# Patient Record
Sex: MALE | Race: WHITE | Employment: FULL TIME | URBAN - NONMETROPOLITAN AREA
[De-identification: names, ages, dates, MRNs, and addresses within clinical notes are randomized per-mention and may not be internally consistent; named-entity substitution may affect disease eponyms.]

---

## 2020-08-12 ENCOUNTER — OFFICE VISIT (OUTPATIENT)
Dept: ENT CLINIC | Age: 50
End: 2020-08-12
Payer: COMMERCIAL

## 2020-08-12 VITALS
HEART RATE: 92 BPM | TEMPERATURE: 97.7 F | SYSTOLIC BLOOD PRESSURE: 123 MMHG | HEIGHT: 70 IN | BODY MASS INDEX: 32.21 KG/M2 | DIASTOLIC BLOOD PRESSURE: 87 MMHG | WEIGHT: 225 LBS | OXYGEN SATURATION: 96 %

## 2020-08-12 PROBLEM — J30.9 ALLERGIC RHINITIS: Status: ACTIVE | Noted: 2020-08-12

## 2020-08-12 PROBLEM — R06.02 SHORTNESS OF BREATH: Status: ACTIVE | Noted: 2020-08-12

## 2020-08-12 PROCEDURE — 99214 OFFICE O/P EST MOD 30 MIN: CPT | Performed by: OTOLARYNGOLOGY

## 2020-08-12 RX ORDER — SEMAGLUTIDE 1.34 MG/ML
0.5 INJECTION, SOLUTION SUBCUTANEOUS WEEKLY
COMMUNITY
Start: 2020-07-14

## 2020-08-12 RX ORDER — PANTOPRAZOLE SODIUM 40 MG/1
TABLET, DELAYED RELEASE ORAL
COMMUNITY
Start: 2020-08-11

## 2020-08-12 RX ORDER — HYDROCHLOROTHIAZIDE 25 MG/1
TABLET ORAL
COMMUNITY
Start: 2020-06-24

## 2020-08-12 RX ORDER — ZINC GLUCONATE 50 MG
50 TABLET ORAL DAILY
COMMUNITY

## 2020-08-12 RX ORDER — MONTELUKAST SODIUM 10 MG/1
10 TABLET ORAL DAILY
COMMUNITY

## 2020-08-12 RX ORDER — SYRINGE WITH NEEDLE, 1 ML 25GX5/8"
SYRINGE, EMPTY DISPOSABLE MISCELLANEOUS
COMMUNITY
Start: 2020-04-14

## 2020-08-12 RX ORDER — M-VIT,TX,IRON,MINS/CALC/FOLIC 27MG-0.4MG
1 TABLET ORAL DAILY
COMMUNITY

## 2020-08-12 RX ORDER — GABAPENTIN 300 MG/1
CAPSULE ORAL
COMMUNITY
Start: 2020-01-14

## 2020-08-12 RX ORDER — LOSARTAN POTASSIUM 100 MG/1
TABLET ORAL
COMMUNITY
Start: 2020-05-29

## 2020-08-12 RX ORDER — OXYCODONE HYDROCHLORIDE AND ACETAMINOPHEN 5; 325 MG/1; MG/1
TABLET ORAL
COMMUNITY
Start: 2020-05-25

## 2020-08-12 RX ORDER — TESTOSTERONE CYPIONATE 200 MG/ML
INJECTION INTRAMUSCULAR
COMMUNITY
Start: 2020-06-19

## 2020-08-12 RX ORDER — MV-MIN/FOLIC/VIT K/LYCOP/COQ10 200-100MCG
CAPSULE ORAL
COMMUNITY

## 2020-08-12 RX ORDER — METFORMIN HYDROCHLORIDE 500 MG/1
500 TABLET, EXTENDED RELEASE ORAL 2 TIMES DAILY
COMMUNITY
Start: 2020-07-14

## 2020-08-12 RX ORDER — SUCRALFATE 1 G/1
TABLET ORAL
COMMUNITY
Start: 2020-07-17

## 2020-08-12 ASSESSMENT — ENCOUNTER SYMPTOMS
VOICE CHANGE: 0
CHOKING: 0
TROUBLE SWALLOWING: 1
DIARRHEA: 0
EYE ITCHING: 0
ANAL BLEEDING: 0
STRIDOR: 0
NAUSEA: 1
COLOR CHANGE: 0
CONSTIPATION: 0
EYE DISCHARGE: 0
SHORTNESS OF BREATH: 1
ABDOMINAL DISTENTION: 0
BLOOD IN STOOL: 0
FACIAL SWELLING: 0
CHEST TIGHTNESS: 0
EYE REDNESS: 0
ABDOMINAL PAIN: 0
BACK PAIN: 0
COUGH: 1
SORE THROAT: 1
RHINORRHEA: 0
EYE PAIN: 0
APNEA: 0
WHEEZING: 1
SINUS PAIN: 0
PHOTOPHOBIA: 0
SINUS PRESSURE: 0
RECTAL PAIN: 0
VOMITING: 0

## 2020-08-12 NOTE — PROGRESS NOTES
[facility] [address] [place] [address] [facility]  [address] [address]  Dept: [phone]  Nandini Layne MD    [facility] 52 y.o. male     Patient presents with a chief complaint of New Patient (Patient was referred by Radha Anderson for Dysphagia, chronic cough. Patient does c/o dizziness, trouble swallowing, and nausea. )       /87 (Site: Left Upper Arm, Position: Sitting, Cuff Size: Medium Adult)   Pulse 92   Temp 97.7 °F (36.5 °C) (Infrared)   Ht 5' 10\" (1.778 m)   Wt 225 lb (102.1 kg)   SpO2 96%   BMI 32.28 kg/m²       History of Presenting Illness: The patient/caregiver reports a history of complaint with the following features: Onset: new onset  Timing: daily  Duration: started about 3 months ago  Quality: lump in throat, breathing feels tight, has thick white mucous in nasal passages, chronic cough  Location: tightness sensation in throat, breathing feels tight, like breath is not full  Severity: pain none  Risk factors: GERD, but no relief with treatment of increased PPI dose, recent pneumonia  Alleviating factors: nothing gives relief  given steroid, antibiotic of levaquin, and inhaler  Aggravating factors: nothing makes it worse  Associated factors: no fevers, no facial pain, COVID testing negative but has a lot of potential exposures at work as     Review of systems covering 10 systems is reviewed as staff entry in other note and pertinent positives and negatives noted.     Past Medical History:   Diagnosis Date    Allergic rhinitis 8/12/2020    Diabetes mellitus (HCC)     Dizziness     GERD (gastroesophageal reflux disease)     High blood pressure        Current Outpatient Medications:     pantoprazole (PROTONIX) 40 MG tablet, , Disp: , Rfl:     Semaglutide,0.25 or 0.5MG/DOS, (OZEMPIC, 0.25 OR 0.5 MG/DOSE,) 2 MG/1.5ML SOPN, Inject 0.5 mg into the skin once a week, Disp: , Rfl:     sucralfate (CARAFATE) 1 GM tablet, MIX ONE TABLET WITH A SMALL AMOUNT OF WATER 20 MINUTES BEFORE LAYING DOWN, Disp: , Rfl:     SYRINGE-NEEDLE, DISP, 3 ML (B-D 3CC LUER-CRISTIN SYR 23GX1\") 23G X 1\" 3 ML MISC, USE ONE SYRINGE ONCE A WEEK, Disp: , Rfl:     testosterone cypionate (DEPOTESTOTERONE CYPIONATE) 200 MG/ML injection, INJECT 0.5MILLILITERS (100MG) INTRAMUSCULARLY EVERY 7 DAYS, Disp: , Rfl:     oxyCODONE-acetaminophen (PERCOCET) 5-325 MG per tablet, Take by mouth., Disp: , Rfl:     montelukast (SINGULAIR) 10 MG tablet, Take 10 mg by mouth daily, Disp: , Rfl:     metFORMIN (GLUCOPHAGE-XR) 500 MG extended release tablet, Take 500 mg by mouth 2 times daily, Disp: , Rfl:     diclofenac sodium (VOLTAREN) 1 % GEL, Place onto the skin, Disp: , Rfl:     gabapentin (NEURONTIN) 300 MG capsule, Take by mouth., Disp: , Rfl:     hydroCHLOROthiazide (HYDRODIURIL) 25 MG tablet, TAKE 1 TABLET BY MOUTH EVERY DAY, Disp: , Rfl:     losartan (COZAAR) 100 MG tablet, , Disp: , Rfl:     Fish Oil-Cholecalciferol (OMEGA-3 + VITAMIN D3) 1110-300 MG-UNIT CAPS, Take by mouth, Disp: , Rfl:     zinc gluconate 50 MG tablet, Take 50 mg by mouth daily, Disp: , Rfl:     Multiple Vitamins-Minerals (THERAPEUTIC MULTIVITAMIN-MINERALS) tablet, Take 1 tablet by mouth daily, Disp: , Rfl:    No Known Allergies   Past Surgical History:   Procedure Laterality Date    KNEE SURGERY Left     NASAL SEPTUM SURGERY      OTHER SURGICAL HISTORY      right hamstring      Social History     Socioeconomic History    Marital status:      Spouse name: Not on file    Number of children: Not on file    Years of education: Not on file    Highest education level: Not on file   Occupational History    Not on file   Social Needs    Financial resource strain: Not on file    Food insecurity     Worry: Not on file     Inability: Not on file    Transportation needs     Medical: Not on file     Non-medical: Not on file   Tobacco Use    Smoking status: Never Smoker    Smokeless tobacco: Never Used   Substance and Sexual Activity    Alcohol use: Not Currently    Drug use: Not Currently    Sexual activity: Not on file   Lifestyle    Physical activity     Days per week: Not on file     Minutes per session: Not on file    Stress: Not on file   Relationships    Social connections     Talks on phone: Not on file     Gets together: Not on file     Attends Jainism service: Not on file     Active member of club or organization: Not on file     Attends meetings of clubs or organizations: Not on file     Relationship status: Not on file    Intimate partner violence     Fear of current or ex partner: Not on file     Emotionally abused: Not on file     Physically abused: Not on file     Forced sexual activity: Not on file   Other Topics Concern    Not on file   Social History Narrative    Not on file     Family History   Problem Relation Age of Onset    High Blood Pressure Mother     High Blood Pressure Father         PHYSICAL EXAM:    The patient was examined today 8/12/2020 with findings as follows:    CONSTITUTIONAL:    General Appearance: well-appearing, nontoxic, alert, no acute distress     Communication: understanding at normal conversational tones, normal voicing, speech intelligible    HEAD/FACE:    Head: atraumatic, normocephalic, no lesions    Facial Inspection: no lesions, healthy skin    Facial Strength: motor strength normal, symmetric strength, symmetric movement, motor strength    Sinuses: no sinus tenderness    Salivary Glands: no enlargements of parotid glands, no tenderness of parotid glands, no masses of parotid glands, clear salivary flow on palpation from Stensen's ducts, no duct stones of Stensen's duct, no enlargement of submandibular glands, no tenderness of submandibular glands, no masses of submandibular glands, clear salivary flow from [place]'s ducts, no stones of [place]'s ducts    Temporomandibular Joint: no crepitus with motion, no tenderness on palpation, no trismus, motion symmetric    EYES:    Pupils: PERRLA, extra-ocular movements intact, no nystagmus, sclera white, no redness of eyes, no watering of eyes    EARS:    Bilateral External Ears: no pits, no tags    Right External Ear: normally formed, no lesions, no mastoid tenderness    Left External Ear: normally formed, no lesions, no mastoid tenderness    Right External Auditory Canal: normal, healthy skin, no obstructing cerumen, no discharge    Left External Auditory Canal: normal, healthy skin, no obstructing cerumen, no discharge    Right Tympanic Membrane: normal landmarks, translucent, mobile to pneumatic otoscopy, no perforation    Left Tympanic Membrane: normal landmarks, translucent, mobile to pneumatic otoscopy, no perforation    Hearing: intact to spoken voice, intact to finger rub    NOSE:    Nasal Skin: no lesions, no lacerations, no scars    Nasal Dorsum: symmetric with no visible or palpable deformities    Nasal Tip: normal symmetric nasal tip, normal nasal valves    Nasal Mucosa: pale, bluish    Septum: not markedly deformed, midline, no exposed vessels, no bleeding, no septal granuloma    Turbinates: 3+ boggy    Nasopharynx: normal    ORAL CAVITY/MOUTH:    Lips, teeth, gums: normal lips, normal gums, dentition intact, no dental pain on palpation    Oral Mucosa: normal, moist, no lesions    Palate: normal hard palate, normal soft palate, symmetric palatal elevation    Floor of Mouth: normal floor of mouth    Tongue: geographic tongue, no lesions, no edema, no masses, normal mucosa, mobile    Tonsils: normal tonsils, symmetric, no lesions    Posterior pharynx: normal    NECK:    Neck: no masses, trachea midline, normal range of motion, no cysts or pits, no tenderness to palpation    Thyroid: normal thyroid, no enlargement, no tenderness, no nodules    LYMPH NODES:    Cervical: no palpable lymph node enlargement    RESPIRATORY:    Inspection/Auscultation: reduced air movement with delay in exhalation, chest expands symmetrically, normal breath sounds, no wheezing, no stridor    CARDIOVASCULAR SYSTEM:    Auscultation: regular rate and rhythm, carotid pulse normal, no carotid thrills, no carotid bruits    Observation/Palpation of Peripheral Vascular System: no varicosities, no cyanosis, no edema    SKIN:    General Appearance: no lesions, warm and dry, normal turgor, no bruising    NEUROLOGICAL SYSTEM:    Orientation: oriented to time, oriented to place, oriented to person    Cranial Nerves: Cranial Nerves II-XII intact, normal facial movement    PSYCHIATRIC:    Mood and affect: normal mood, normal affect          Assessment and Plan:    I do not see any significant sinus disease or discharge. He does report GERD history and laryngospasm is considered. I am concerned about possible pulmonary restriction given his symptoms and findings on ausculation today. I feel that is jodie be useful to have a pulmonary evaluation and possible function testing for further evaluation. Diagnosis Orders   1. Shortness of breath     2. Allergic rhinitis, unspecified seasonality, unspecified trigger     3. Gastroesophageal reflux disease, esophagitis presence not specified        No follow-ups on file. The patient and/or caregiver is to notify the office if no improvement or worsening of symptoms is noted prior to the scheduled follow-up for sooner evaluation. The patient and/or caregiver is able to state an understanding of these recommendations and is agreeable to the treatment plan. --Preston Espinoza MD on 8/12/2020 at 9:49 AM    An electronic signature was used to authenticate this note.

## 2020-08-12 NOTE — PATIENT INSTRUCTIONS
SURVEY:    You may be receiving a survey from [employer] regarding your visit today. Please complete the survey to enable us to provide the highest quality of care to you and your family. If you cannot score us a very good on any question, please call the office to discuss how we could have made your experience a very good one. Thank you.

## 2020-08-12 NOTE — PROGRESS NOTES
Review of Systems   Constitutional: Positive for appetite change and fatigue. Negative for activity change, chills, diaphoresis, fever and unexpected weight change. HENT: Positive for postnasal drip, sore throat and trouble swallowing. Negative for congestion, dental problem, drooling, ear discharge, ear pain, facial swelling, hearing loss, mouth sores, nosebleeds, rhinorrhea, sinus pressure, sinus pain, sneezing, tinnitus and voice change. Eyes: Negative for photophobia, pain, discharge, redness, itching and visual disturbance. Respiratory: Positive for cough, shortness of breath and wheezing. Negative for apnea, choking, chest tightness and stridor. Cardiovascular: Negative for chest pain, palpitations and leg swelling. Gastrointestinal: Positive for nausea. Negative for abdominal distention, abdominal pain, anal bleeding, blood in stool, constipation, diarrhea, rectal pain and vomiting. Endocrine: Negative for cold intolerance, heat intolerance, polydipsia, polyphagia and polyuria. Genitourinary: Negative for decreased urine volume, difficulty urinating, discharge, dysuria, enuresis, flank pain, frequency, genital sores, hematuria, penile pain, penile swelling, scrotal swelling, testicular pain and urgency. Musculoskeletal: Negative for arthralgias, back pain, gait problem, joint swelling, myalgias, neck pain and neck stiffness. Skin: Negative for color change, pallor, rash and wound. Allergic/Immunologic: Negative for environmental allergies, food allergies and immunocompromised state. Neurological: Positive for dizziness and light-headedness. Negative for tremors, seizures, syncope, facial asymmetry, speech difficulty, weakness, numbness and headaches. Hematological: Negative for adenopathy. Does not bruise/bleed easily. Psychiatric/Behavioral: Positive for sleep disturbance.  Negative for agitation, behavioral problems, confusion, decreased concentration, dysphoric mood, hallucinations, self-injury and suicidal ideas. The patient is not nervous/anxious and is not hyperactive.

## 2021-08-11 ENCOUNTER — OFFICE VISIT (OUTPATIENT)
Dept: ENT CLINIC | Age: 51
End: 2021-08-11
Payer: COMMERCIAL

## 2021-08-11 VITALS
SYSTOLIC BLOOD PRESSURE: 122 MMHG | WEIGHT: 212 LBS | DIASTOLIC BLOOD PRESSURE: 74 MMHG | HEART RATE: 90 BPM | BODY MASS INDEX: 30.42 KG/M2 | OXYGEN SATURATION: 96 %

## 2021-08-11 DIAGNOSIS — L43.9 LICHEN PLANUS: Primary | ICD-10-CM

## 2021-08-11 DIAGNOSIS — R06.02 SHORTNESS OF BREATH: ICD-10-CM

## 2021-08-11 PROCEDURE — 99213 OFFICE O/P EST LOW 20 MIN: CPT | Performed by: OTOLARYNGOLOGY

## 2021-08-11 RX ORDER — DEXAMETHASONE 0.5 MG/5ML
2 ELIXIR ORAL DAILY
Qty: 200 ML | Refills: 1 | Status: SHIPPED | OUTPATIENT
Start: 2021-08-11 | End: 2021-08-21

## 2021-08-11 RX ORDER — CARVEDILOL 25 MG/1
TABLET ORAL
COMMUNITY

## 2021-08-11 ASSESSMENT — ENCOUNTER SYMPTOMS
VOMITING: 0
NAUSEA: 0
TROUBLE SWALLOWING: 1
ANAL BLEEDING: 0
COLOR CHANGE: 0
EYE DISCHARGE: 0
SHORTNESS OF BREATH: 1
BLOOD IN STOOL: 0
RHINORRHEA: 0
VOICE CHANGE: 1
PHOTOPHOBIA: 0
FACIAL SWELLING: 0
EYE PAIN: 0
SORE THROAT: 1
DIARRHEA: 0
SINUS PAIN: 0
ABDOMINAL PAIN: 0
EYE REDNESS: 0
WHEEZING: 1
RECTAL PAIN: 0
CHOKING: 0
APNEA: 0
STRIDOR: 0
ABDOMINAL DISTENTION: 0
CONSTIPATION: 0
COUGH: 0
SINUS PRESSURE: 0
EYE ITCHING: 0
CHEST TIGHTNESS: 0
BACK PAIN: 0

## 2021-08-11 NOTE — PROGRESS NOTES
Review of Systems   Constitutional: Negative for activity change, appetite change, chills, diaphoresis, fatigue, fever and unexpected weight change. HENT: Positive for sore throat, trouble swallowing and voice change. Negative for congestion, dental problem, drooling, ear discharge, ear pain, facial swelling, hearing loss, mouth sores, nosebleeds, postnasal drip, rhinorrhea, sinus pressure, sinus pain, sneezing and tinnitus. Eyes: Negative for photophobia, pain, discharge, redness, itching and visual disturbance. Respiratory: Positive for shortness of breath and wheezing. Negative for apnea, cough, choking, chest tightness and stridor. Cardiovascular: Negative for chest pain, palpitations and leg swelling. Gastrointestinal: Negative for abdominal distention, abdominal pain, anal bleeding, blood in stool, constipation, diarrhea, nausea, rectal pain and vomiting. Endocrine: Negative for cold intolerance, heat intolerance, polydipsia, polyphagia and polyuria. Genitourinary: Negative for decreased urine volume, difficulty urinating, discharge, dysuria, enuresis, flank pain, frequency, genital sores, hematuria, penile pain, penile swelling, scrotal swelling, testicular pain and urgency. Musculoskeletal: Positive for neck pain. Negative for arthralgias, back pain, gait problem, joint swelling, myalgias and neck stiffness. Skin: Negative for color change, pallor, rash and wound. Allergic/Immunologic: Negative for environmental allergies, food allergies and immunocompromised state. Neurological: Negative for dizziness, tremors, seizures, syncope, facial asymmetry, speech difficulty, weakness, light-headedness, numbness and headaches. Hematological: Negative for adenopathy. Does not bruise/bleed easily. Psychiatric/Behavioral: Negative for agitation, behavioral problems, confusion, decreased concentration, dysphoric mood, hallucinations, self-injury, sleep disturbance and suicidal ideas.  The patient is not nervous/anxious and is not hyperactive.

## 2021-08-11 NOTE — PROGRESS NOTES
[address] [facility]  [address]  Dept: [phone]  MD Sarahi Lopez 54 y.o. male     Patient presents with a chief complaint of Gastroesophageal Reflux (Patient still concern for GERD, without improvement from OTC medication. )       /74 (Site: Right Upper Arm, Position: Sitting, Cuff Size: Large Adult)   Pulse 90   Wt 212 lb (96.2 kg)   SpO2 96%   BMI 30.42 kg/m²       History of Presenting Illness: The patient/caregiver reports a history of complaint with the following features: Onset: started several months ago  Timing: daily  Duration: started about 3 months ago  Quality: lump in throat, breathing feels tight  Location: tightness sensation in throat, breathing feels tight, like breath is not full  Severity: pain mild sore throat, tender to touch side of neck  Risk factors: GERD, but no relief with treatment of increased PPI dose or inhalers  Alleviating factors: nothing gives relief  given steroid, antibiotic of levaquin, and inhaler  Aggravating factors: nothing makes it worse  Associated factors: no fevers, no facial pain    He reports that no abnormality was noted and pulmonary function testing was normal.    Review of systems covering 10 systems is reviewed as staff entry in other note and pertinent positives and negatives noted.     Past Medical History:   Diagnosis Date    Allergic rhinitis 8/12/2020    Diabetes mellitus (HCC)     Dizziness     GERD (gastroesophageal reflux disease)     High blood pressure        Current Outpatient Medications:     carvedilol (COREG) 25 MG tablet, Take by mouth, Disp: , Rfl:     diclofenac sodium (VOLTAREN) 1 % GEL, APPLY TO AFFECTED AREA 4 TIMES A DAY AS NEEDED FOR PAIN, Disp: , Rfl:     dexamethasone 0.5 MG/5ML elixir, Take 20 mLs by mouth daily for 10 days, Disp: 200 mL, Rfl: 1    pantoprazole (PROTONIX) 40 MG tablet, , Disp: , Rfl:    Semaglutide,0.25 or 0.5MG/DOS, (OZEMPIC, 0.25 OR 0.5 MG/DOSE,) 2 MG/1.5ML SOPN, Inject 0.5 mg into the skin once a week, Disp: , Rfl:     sucralfate (CARAFATE) 1 GM tablet, MIX ONE TABLET WITH A SMALL AMOUNT OF WATER 20 MINUTES BEFORE LAYING DOWN, Disp: , Rfl:     SYRINGE-NEEDLE, DISP, 3 ML (B-D 3CC LUER-CRISTIN SYR 23GX1\") 23G X 1\" 3 ML MISC, USE ONE SYRINGE ONCE A WEEK, Disp: , Rfl:     testosterone cypionate (DEPOTESTOTERONE CYPIONATE) 200 MG/ML injection, INJECT 0.5MILLILITERS (100MG) INTRAMUSCULARLY EVERY 7 DAYS, Disp: , Rfl:     oxyCODONE-acetaminophen (PERCOCET) 5-325 MG per tablet, Take by mouth., Disp: , Rfl:     montelukast (SINGULAIR) 10 MG tablet, Take 10 mg by mouth daily, Disp: , Rfl:     metFORMIN (GLUCOPHAGE-XR) 500 MG extended release tablet, Take 500 mg by mouth 2 times daily, Disp: , Rfl:     diclofenac sodium (VOLTAREN) 1 % GEL, Place onto the skin, Disp: , Rfl:     gabapentin (NEURONTIN) 300 MG capsule, Take by mouth., Disp: , Rfl:     hydroCHLOROthiazide (HYDRODIURIL) 25 MG tablet, TAKE 1 TABLET BY MOUTH EVERY DAY, Disp: , Rfl:     losartan (COZAAR) 100 MG tablet, , Disp: , Rfl:     Fish Oil-Cholecalciferol (OMEGA-3 + VITAMIN D3) 1110-300 MG-UNIT CAPS, Take by mouth, Disp: , Rfl:     zinc gluconate 50 MG tablet, Take 50 mg by mouth daily, Disp: , Rfl:     Multiple Vitamins-Minerals (THERAPEUTIC MULTIVITAMIN-MINERALS) tablet, Take 1 tablet by mouth daily, Disp: , Rfl:    No Known Allergies   Past Surgical History:   Procedure Laterality Date    KNEE SURGERY Left     NASAL SEPTUM SURGERY      OTHER SURGICAL HISTORY      right hamstring      Social History     Socioeconomic History    Marital status:      Spouse name: Not on file    Number of children: Not on file    Years of education: Not on file    Highest education level: Not on file   Occupational History    Not on file   Tobacco Use    Smoking status: Never Smoker    Smokeless tobacco: Never Used   Vaping Use    Vaping Use: Never used   Substance and Sexual Activity    Alcohol use: Not Currently    Drug use: Not Currently    Sexual activity: Not on file   Other Topics Concern    Not on file   Social History Narrative    Not on file     Social Determinants of Health     Financial Resource Strain:     Difficulty of Paying Living Expenses:    Food Insecurity:     Worried About Running Out of Food in the Last Year:     [address] in the Last Year:    Transportation Needs:     Lack of Transportation (Medical):      Lack of Transportation (Non-Medical):    Physical Activity:     Days of Exercise per Week:     Minutes of Exercise per Session:    Stress:     Feeling of Stress :    Social Connections:     Frequency of Communication with Friends and Family:     Frequency of Social Gatherings with Friends and Family:     Attends Judaism Services:     Active Member of Clubs or Organizations:     Attends Club or Organization Meetings:     Marital Status:    Intimate Partner Violence:     Fear of Current or Ex-Partner:     Emotionally Abused:     Physically Abused:     Sexually Abused:      Family History   Problem Relation Age of Onset    High Blood Pressure Mother     High Blood Pressure Father         PHYSICAL EXAM:    The patient was examined today 8/11/2021 with findings as follows:    CONSTITUTIONAL:    General Appearance: well-appearing, nontoxic, alert, no acute distress     Communication: understanding at normal conversational tones, normal voicing, speech intelligible    HEAD/FACE:    Head: atraumatic, normocephalic, no lesions    Facial Inspection: no lesions, healthy skin    Facial Strength: motor strength normal, symmetric strength, symmetric movement, motor strength    Sinuses: no sinus tenderness    Salivary Glands: no enlargements of parotid glands, no tenderness of parotid glands, no masses of parotid glands, clear salivary flow on palpation from Stensen's ducts, no duct stones of [place]ensen's duct, no enlargement of submandibular glands, no tenderness of submandibular glands, no masses of submandibular glands, clear salivary flow from [place]'s ducts, no stones of [place]'s ducts    Temporomandibular Joint: no crepitus with motion, no tenderness on palpation, no trismus, motion symmetric    EYES:    Pupils: PERRLA, extra-ocular movements intact, no nystagmus, sclera white, no redness of eyes, no watering of eyes    EARS:    Bilateral External Ears: no pits, no tags    Right External Ear: normally formed, no lesions, no mastoid tenderness    Left External Ear: normally formed, no lesions, no mastoid tenderness    Right External Auditory Canal: normal, healthy skin, no obstructing cerumen, no discharge    Left External Auditory Canal: normal, healthy skin, no obstructing cerumen, no discharge    Right Tympanic Membrane: normal landmarks, translucent, mobile to pneumatic otoscopy, no perforation    Left Tympanic Membrane: normal landmarks, translucent, mobile to pneumatic otoscopy, no perforation    Hearing: intact to spoken voice, intact to finger rub    NOSE:    Nasal Skin: no lesions, no lacerations, no scars    Nasal Dorsum: symmetric with no visible or palpable deformities    Nasal Tip: normal symmetric nasal tip, normal nasal valves    Nasal Mucosa: pale, bluish    Septum: not markedly deformed, midline, no exposed vessels, no bleeding, no septal granuloma    Turbinates: 3+ boggy    Nasopharynx: normal    ORAL CAVITY/MOUTH:    Lips, teeth, gums: normal lips, normal gums, dentition intact, no dental pain on palpation    Oral Mucosa: normal, moist, no lesions    Palate: normal hard palate, normal soft palate, symmetric palatal elevation    Floor of Mouth: normal floor of mouth    Tongue: geographic tongue, no lesions, no edema, no masses, normal mucosa, mobile    Tonsils: normal tonsils, symmetric, no lesions    Posterior pharynx: normal    NECK:    Neck: no masses, trachea midline, normal range of motion, no cysts or pits, no tenderness to palpation    Thyroid: normal thyroid, no enlargement, no tenderness, no nodules    HYPOPHARYNX/LARYNX:    Hypopharynx: normal hypopharynx, normal tongue base, normal pyriform sinus, normal vallecula    Larynx: normal epiglottis, normal false vocal cords, normal true vocal cords, normal glottic mobility, no arytenoid edema, no post-cricoid edema, no subglottic stenosis    LYMPH NODES:    Cervical: no palpable lymph node enlargement    RESPIRATORY:    Inspection/Auscultation: reduced air movement with delay in exhalation, chest expands symmetrically, normal breath sounds, no wheezing, no stridor    CARDIOVASCULAR SYSTEM:    Auscultation: regular rate and rhythm, carotid pulse normal, no carotid thrills, no carotid bruits    Observation/Palpation of Peripheral Vascular System: no varicosities, no cyanosis, no edema    SKIN:    General Appearance: no lesions, warm and dry, normal turgor, no bruising    NEUROLOGICAL SYSTEM:    Orientation: oriented to time, oriented to place, oriented to person    Cranial Nerves: Cranial Nerves II-XII intact, normal facial movement    PSYCHIATRIC:    Mood and affect: normal mood, normal affect          Assessment and Plan:    I do not see any significant sinus disease or discharge. He does report GERD history and laryngospasm is again considered. He reports that pulmonary function testing was normal.  He continue to have mucosal change of the tongue and lichen planus is considered given his new reports of tenderness as well as more diffuse inflammation such as eosinophilic esophagitis. I have offered a trial of oral steroid rinse to see if this gives some relief. We have also have discussed keeping a food diary to identify any possible triggers. Diagnosis Orders   1. Lichen planus  dexamethasone 0.5 MG/5ML elixir   2. Shortness of breath        Return in about 3 months (around 11/11/2021).     The patient and/or caregiver is to notify the office if no improvement or worsening of symptoms is noted prior to the scheduled follow-up for sooner evaluation. The patient and/or caregiver is able to state an understanding of these recommendations and is agreeable to the treatment plan. --Alejandra Carlson MD on 8/11/2021 at 3:37 PM    An electronic signature was used to authenticate this note.

## 2021-08-11 NOTE — PATIENT INSTRUCTIONS
SURVEY:    You may be receiving a survey from [facility] regarding your visit today. Please complete the survey to enable us to provide the highest quality of care to you and your family. If you cannot score us as very good on any question, please call the office to discuss how we could have made your experience exceptional.     Thank you.

## 2021-11-10 ENCOUNTER — OFFICE VISIT (OUTPATIENT)
Dept: ENT CLINIC | Age: 51
End: 2021-11-10
Payer: COMMERCIAL

## 2021-11-10 VITALS
HEIGHT: 70 IN | RESPIRATION RATE: 18 BRPM | OXYGEN SATURATION: 98 % | HEART RATE: 88 BPM | BODY MASS INDEX: 29.35 KG/M2 | DIASTOLIC BLOOD PRESSURE: 82 MMHG | TEMPERATURE: 97.2 F | SYSTOLIC BLOOD PRESSURE: 124 MMHG | WEIGHT: 205 LBS

## 2021-11-10 DIAGNOSIS — R06.02 SHORTNESS OF BREATH: ICD-10-CM

## 2021-11-10 DIAGNOSIS — R07.0 THROAT PAIN IN ADULT: Primary | ICD-10-CM

## 2021-11-10 DIAGNOSIS — L43.9 LICHEN PLANUS: ICD-10-CM

## 2021-11-10 PROCEDURE — 99213 OFFICE O/P EST LOW 20 MIN: CPT | Performed by: OTOLARYNGOLOGY

## 2021-11-10 ASSESSMENT — ENCOUNTER SYMPTOMS
SINUS PAIN: 0
VOMITING: 0
SORE THROAT: 1
CHOKING: 0
ABDOMINAL DISTENTION: 0
BACK PAIN: 0
BLOOD IN STOOL: 0
ABDOMINAL PAIN: 0
EYE ITCHING: 0
FACIAL SWELLING: 0
EYE DISCHARGE: 0
TROUBLE SWALLOWING: 0
RECTAL PAIN: 0
PHOTOPHOBIA: 0
SINUS PRESSURE: 0
COLOR CHANGE: 0
WHEEZING: 0
RHINORRHEA: 0
EYE PAIN: 0
COUGH: 0
CHEST TIGHTNESS: 0
CONSTIPATION: 0
DIARRHEA: 0
APNEA: 0
STRIDOR: 0
EYE REDNESS: 0
SHORTNESS OF BREATH: 0
VOICE CHANGE: 0
NAUSEA: 0
ANAL BLEEDING: 0

## 2021-11-10 NOTE — PROGRESS NOTES
Review of Systems   Constitutional: Negative for activity change, appetite change, chills, diaphoresis, fatigue, fever and unexpected weight change. HENT: Positive for sore throat. Negative for congestion, dental problem, drooling, ear discharge, ear pain, facial swelling, hearing loss, mouth sores, nosebleeds, postnasal drip, rhinorrhea, sinus pressure, sinus pain, sneezing, tinnitus, trouble swallowing and voice change. Eyes: Negative for photophobia, pain, discharge, redness, itching and visual disturbance. Respiratory: Negative for apnea, cough, choking, chest tightness, shortness of breath, wheezing and stridor. Cardiovascular: Negative for chest pain, palpitations and leg swelling. Gastrointestinal: Negative for abdominal distention, abdominal pain, anal bleeding, blood in stool, constipation, diarrhea, nausea, rectal pain and vomiting. Endocrine: Negative for cold intolerance, heat intolerance, polydipsia, polyphagia and polyuria. Genitourinary: Negative for decreased urine volume, difficulty urinating, dysuria, enuresis, flank pain, frequency, genital sores, hematuria, penile discharge, penile pain, penile swelling, scrotal swelling, testicular pain and urgency. Musculoskeletal: Negative for arthralgias, back pain, gait problem, joint swelling, myalgias, neck pain and neck stiffness. Skin: Negative for color change, pallor, rash and wound. Allergic/Immunologic: Negative for environmental allergies, food allergies and immunocompromised state. Neurological: Negative for dizziness, tremors, seizures, syncope, facial asymmetry, speech difficulty, weakness, light-headedness, numbness and headaches. Hematological: Negative for adenopathy. Does not bruise/bleed easily. Psychiatric/Behavioral: Negative for agitation, behavioral problems, confusion, decreased concentration, dysphoric mood, hallucinations, self-injury, sleep disturbance and suicidal ideas.  The patient is not nervous/anxious and is not hyperactive.

## 2022-08-03 ENCOUNTER — HOSPITAL ENCOUNTER (OUTPATIENT)
Dept: PHYSICAL THERAPY | Age: 52
Setting detail: THERAPIES SERIES
Discharge: HOME | End: 2022-08-03

## 2022-08-05 NOTE — THERAPY EVALUATION
Phone: Scooter Rey    Fax: [phone]                   Outpatient Physical Therapy                                      FUNCTIONAL CAPACITY EVALUATION  Date: 8/3/2022  Client: Tracy Hunt        Diagnosis: Left knee pain    : 1970  (50 y.o.)  Referring Physician:  Dr. Sahil Emery MD;  Bulmaro Flores CNP    Tracy Hunt is a 50 y.o. white male who was referred for a functional capacity evaluation to determine his current physical tolerance in regards to material and non-material handling activities and to assess his ability for return to work. Client has experienced left knee pain since  after sustaining a work related injury as a [employer]  when involved in [employer]. He has undergone several surgeries including ORIF and subsequent hardware removal, injections and physical therapy. He had returned to work however in  he contracted COVID with hospitalization and placed on ventilator. 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 PMHx also includes SOB and stuttering resulting form Covid long haulers, [employer]  [employer]ures left hand. SUBJECTIVE:  Subjectively, the patient rates his current left knee pain at a level of 4/10 (on a scale of 0-10 with 10 requiring medical assistance) with daily activities. His pain ranges from   4-8/10 depending on activity or position. Post-evaluation his pain had increased to 5/10. In regards to his left knee, he notes constant pain which varies based on position or activity. Medication provides minimal pain relief. The Client is able to complete self-care activities and most household chores but may experience increased left knee pain; excessive activity may cause him to be unable to complete activities for several day afterwards.   He is able to drive short distances but must take frequent postural breaks if riding/driving for longer distances. He has a home TENS unit which he may use at night and applies ice several times a day for pain/edema control. He notes that his left knee \" reginaldo\" and he experiences lateral leg numbness;  he also reports increased sensitivity over the incisional area and avoids bumping or touching that region due to this sensitivity. OBJECTIVE:    Strength:     strength for the right hand = 98# while the left = 75#. Normative data for the clients age and sex is right = # # and left = ##. Left upper extremity strength is generally graded 5/5 flexion and 5/5 abduction. Right upper extremity flexion and abduction 5/5. Left lower extremity hip flexion graded 4+/5 and quad/knee extension 4+-5/5 with 1 repetition manual muscle testing;  right LE is graded 5/5 for knee extension and hip flexion. An isometric strength comparison test was also completed which revealed a 30% quad strength deficit of left compared to right. Range of Motion:   Left hip mobility WFL;  left knee active ROM limited 25 deg extension to 90 deg flexion. Material Handling:  (Maximum safe weight without an increase in pain level. Weight recorded is to be lifting only on an infrequent basis)    -Floor to Waist:   50-75#    -Work simulation:  Client was able to pull a 200# dummy across the floor on an infrequent basis. He would not be safe to carry such a dummy due to instability of the left knee. Non-Material Handling:    -Bending/Stooping (repetitive)   []Not Recommended    [x]Occasional     []Frequent     []Constant    -Bending/Squatting (static)  []Not Recommended    [x]Occasional     []Frequent     []Constant    -Kneeling   []Not Recommended    [x]Occasional     []Frequent     []Constant    -Sitting -  1 hour before requiring postural change.    -Standing - < 10 min. before requiring postural change.     -Walking - []Not Recommended  [x]Occasional    []Frequent evaluation, please feel free to contact me at [phone]. Thank you.        Sincerely,     BRITTANY JOY, PT

## 2023-06-21 LAB
ALANINE AMINOTRANSFERASE (SGPT) (U/L) IN SER/PLAS: 28 U/L (ref 10–52)
ALBUMIN (G/DL) IN SER/PLAS: 4.6 G/DL (ref 3.4–5)
ALKALINE PHOSPHATASE (U/L) IN SER/PLAS: 121 U/L (ref 33–120)
ANION GAP IN SER/PLAS: 14 MMOL/L (ref 10–20)
ASPARTATE AMINOTRANSFERASE (SGOT) (U/L) IN SER/PLAS: 20 U/L (ref 9–39)
BASOPHILS (10*3/UL) IN BLOOD BY AUTOMATED COUNT: 0.06 X10E9/L (ref 0–0.1)
BASOPHILS/100 LEUKOCYTES IN BLOOD BY AUTOMATED COUNT: 0.6 % (ref 0–2)
BILIRUBIN TOTAL (MG/DL) IN SER/PLAS: 0.6 MG/DL (ref 0–1.2)
CALCIUM (MG/DL) IN SER/PLAS: 9.4 MG/DL (ref 8.6–10.3)
CARBON DIOXIDE, TOTAL (MMOL/L) IN SER/PLAS: 27 MMOL/L (ref 21–32)
CHLORIDE (MMOL/L) IN SER/PLAS: 103 MMOL/L (ref 98–107)
CREATININE (MG/DL) IN SER/PLAS: 1.02 MG/DL (ref 0.5–1.3)
EOSINOPHILS (10*3/UL) IN BLOOD BY AUTOMATED COUNT: 0.26 X10E9/L (ref 0–0.7)
EOSINOPHILS/100 LEUKOCYTES IN BLOOD BY AUTOMATED COUNT: 2.8 % (ref 0–6)
ERYTHROCYTE DISTRIBUTION WIDTH (RATIO) BY AUTOMATED COUNT: 12.3 % (ref 11.5–14.5)
ERYTHROCYTE MEAN CORPUSCULAR HEMOGLOBIN CONCENTRATION (G/DL) BY AUTOMATED: 34.8 G/DL (ref 32–36)
ERYTHROCYTE MEAN CORPUSCULAR VOLUME (FL) BY AUTOMATED COUNT: 90 FL (ref 80–100)
ERYTHROCYTES (10*6/UL) IN BLOOD BY AUTOMATED COUNT: 4.77 X10E12/L (ref 4.5–5.9)
ESTIMATED AVERAGE GLUCOSE FOR HBA1C: 160 MG/DL
GFR MALE: 89 ML/MIN/1.73M2
GLUCOSE (MG/DL) IN SER/PLAS: 133 MG/DL (ref 74–99)
HEMATOCRIT (%) IN BLOOD BY AUTOMATED COUNT: 42.8 % (ref 41–52)
HEMOGLOBIN (G/DL) IN BLOOD: 14.9 G/DL (ref 13.5–17.5)
HEMOGLOBIN A1C/HEMOGLOBIN TOTAL IN BLOOD: 7.2 %
IMMATURE GRANULOCYTES/100 LEUKOCYTES IN BLOOD BY AUTOMATED COUNT: 1.4 % (ref 0–0.9)
LEUKOCYTES (10*3/UL) IN BLOOD BY AUTOMATED COUNT: 9.3 X10E9/L (ref 4.4–11.3)
LYMPHOCYTES (10*3/UL) IN BLOOD BY AUTOMATED COUNT: 1.57 X10E9/L (ref 1.2–4.8)
LYMPHOCYTES/100 LEUKOCYTES IN BLOOD BY AUTOMATED COUNT: 17 % (ref 13–44)
MONOCYTES (10*3/UL) IN BLOOD BY AUTOMATED COUNT: 0.64 X10E9/L (ref 0.1–1)
MONOCYTES/100 LEUKOCYTES IN BLOOD BY AUTOMATED COUNT: 6.9 % (ref 2–10)
NEUTROPHILS (10*3/UL) IN BLOOD BY AUTOMATED COUNT: 6.6 X10E9/L (ref 1.2–7.7)
NEUTROPHILS/100 LEUKOCYTES IN BLOOD BY AUTOMATED COUNT: 71.3 % (ref 40–80)
PLATELETS (10*3/UL) IN BLOOD AUTOMATED COUNT: 384 X10E9/L (ref 150–450)
POTASSIUM (MMOL/L) IN SER/PLAS: 3.8 MMOL/L (ref 3.5–5.3)
PROTEIN TOTAL: 6.9 G/DL (ref 6.4–8.2)
SODIUM (MMOL/L) IN SER/PLAS: 140 MMOL/L (ref 136–145)
UREA NITROGEN (MG/DL) IN SER/PLAS: 22 MG/DL (ref 6–23)

## 2023-06-22 LAB
DEHYDROEPIANDROSTERONE SULFATE (DHEA-S) (UG/DL) IN SER/: 145 UG/DL (ref 95–530)
LUTEINIZING HORMONE (IU/ML) IN SER/PLAS: 3 IU/L

## 2023-09-15 LAB
ANION GAP IN SER/PLAS: 12 MMOL/L (ref 10–20)
CALCIUM (MG/DL) IN SER/PLAS: 8.5 MG/DL (ref 8.6–10.3)
CARBON DIOXIDE, TOTAL (MMOL/L) IN SER/PLAS: 28 MMOL/L (ref 21–32)
CHLORIDE (MMOL/L) IN SER/PLAS: 102 MMOL/L (ref 98–107)
CREATININE (MG/DL) IN SER/PLAS: 1.19 MG/DL (ref 0.5–1.3)
GFR MALE: 74 ML/MIN/1.73M2
GLUCOSE (MG/DL) IN SER/PLAS: 318 MG/DL (ref 74–99)
POTASSIUM (MMOL/L) IN SER/PLAS: 3.9 MMOL/L (ref 3.5–5.3)
SODIUM (MMOL/L) IN SER/PLAS: 138 MMOL/L (ref 136–145)
UREA NITROGEN (MG/DL) IN SER/PLAS: 17 MG/DL (ref 6–23)

## 2023-09-29 LAB
ANION GAP IN SER/PLAS: 12 MMOL/L (ref 10–20)
CALCIUM (MG/DL) IN SER/PLAS: 8.7 MG/DL (ref 8.6–10.3)
CARBON DIOXIDE, TOTAL (MMOL/L) IN SER/PLAS: 27 MMOL/L (ref 21–32)
CHLORIDE (MMOL/L) IN SER/PLAS: 105 MMOL/L (ref 98–107)
CREATININE (MG/DL) IN SER/PLAS: 1.05 MG/DL (ref 0.5–1.3)
GFR MALE: 86 ML/MIN/1.73M2
GLUCOSE (MG/DL) IN SER/PLAS: 139 MG/DL (ref 74–99)
POTASSIUM (MMOL/L) IN SER/PLAS: 3.9 MMOL/L (ref 3.5–5.3)
SODIUM (MMOL/L) IN SER/PLAS: 140 MMOL/L (ref 136–145)
UREA NITROGEN (MG/DL) IN SER/PLAS: 18 MG/DL (ref 6–23)

## 2023-10-04 ENCOUNTER — HOSPITAL ENCOUNTER (OUTPATIENT)
Dept: RADIOLOGY | Facility: HOSPITAL | Age: 53
Discharge: HOME | End: 2023-10-04
Payer: COMMERCIAL

## 2023-10-04 DIAGNOSIS — M50.10 CERVICAL DISC DISORDER WITH RADICULOPATHY, UNSPECIFIED CERVICAL REGION: ICD-10-CM

## 2023-10-04 PROCEDURE — 72141 MRI NECK SPINE W/O DYE: CPT | Performed by: RADIOLOGY

## 2023-10-04 PROCEDURE — 72141 MRI NECK SPINE W/O DYE: CPT

## 2023-10-11 PROBLEM — G43.719 INTRACTABLE CHRONIC COMMON MIGRAINE WITHOUT AURA: Status: ACTIVE | Noted: 2023-10-11

## 2023-10-11 PROBLEM — J38.01 PARALYSIS OF RIGHT VOCAL CORD: Status: ACTIVE | Noted: 2023-04-17

## 2023-10-11 PROBLEM — Z98.1 HISTORY OF CERVICAL SPINAL ARTHRODESIS: Status: ACTIVE | Noted: 2023-10-11

## 2023-10-11 PROBLEM — F41.9 ANXIETY DISORDER: Status: ACTIVE | Noted: 2020-09-23

## 2023-10-11 PROBLEM — Z99.11 DEPENDENT ON VENTILATOR (MULTI): Status: ACTIVE | Noted: 2021-09-20

## 2023-10-11 PROBLEM — E66.9 OBESITY: Status: ACTIVE | Noted: 2023-10-11

## 2023-10-11 PROBLEM — J45.20 INTERMITTENT ASTHMA (HHS-HCC): Status: ACTIVE | Noted: 2020-05-29

## 2023-10-11 PROBLEM — R79.89 DECREASED TESTOSTERONE LEVEL: Status: ACTIVE | Noted: 2023-01-24

## 2023-10-11 PROBLEM — G93.49 STATIC ENCEPHALOPATHY: Status: ACTIVE | Noted: 2022-03-16

## 2023-10-11 PROBLEM — M17.12 LEFT KNEE DJD: Status: ACTIVE | Noted: 2023-10-11

## 2023-10-11 PROBLEM — M54.12 CERVICAL RADICULITIS: Status: ACTIVE | Noted: 2023-10-11

## 2023-10-11 PROBLEM — D35.02 BENIGN NEOPLASM OF LEFT ADRENAL GLAND: Status: ACTIVE | Noted: 2020-08-31

## 2023-10-11 PROBLEM — M19.90 ARTHRITIS: Status: ACTIVE | Noted: 2023-10-11

## 2023-10-11 PROBLEM — R13.14 PHARYNGOESOPHAGEAL DYSPHAGIA: Status: ACTIVE | Noted: 2023-04-17

## 2023-10-11 PROBLEM — L29.9 SEVERE ITCHING: Status: ACTIVE | Noted: 2023-10-11

## 2023-10-11 PROBLEM — E11.9 DIABETES (MULTI): Status: ACTIVE | Noted: 2023-04-26

## 2023-10-11 PROBLEM — G44.221 CHRONIC TENSION-TYPE HEADACHE, INTRACTABLE: Status: ACTIVE | Noted: 2023-10-11

## 2023-10-11 PROBLEM — F51.04 CHRONIC INSOMNIA: Status: ACTIVE | Noted: 2022-12-21

## 2023-10-11 PROBLEM — M50.20 DISPLACEMENT OF CERVICAL INTERVERTEBRAL DISC: Status: ACTIVE | Noted: 2023-10-11

## 2023-10-11 PROBLEM — R06.00 DYSPNEA: Status: ACTIVE | Noted: 2023-10-11

## 2023-10-11 PROBLEM — M54.81 OCCIPITAL NEURALGIA: Status: ACTIVE | Noted: 2023-10-11

## 2023-10-11 PROBLEM — K21.9 GASTROESOPHAGEAL REFLUX DISEASE: Status: ACTIVE | Noted: 2023-01-28

## 2023-10-11 PROBLEM — G47.33 OSA (OBSTRUCTIVE SLEEP APNEA): Status: ACTIVE | Noted: 2023-10-11

## 2023-10-11 PROBLEM — E29.1 DEFICIENCY OF TESTOSTERONE BIOSYNTHESIS: Status: ACTIVE | Noted: 2022-03-27

## 2023-10-11 PROBLEM — R63.4 UNEXPLAINED WEIGHT LOSS: Status: ACTIVE | Noted: 2020-09-23

## 2023-10-11 PROBLEM — M47.812 SPONDYLOSIS OF CERVICAL JOINT WITHOUT MYELOPATHY: Status: ACTIVE | Noted: 2023-10-11

## 2023-10-11 PROBLEM — I10 HYPERTENSION, ESSENTIAL, BENIGN: Status: ACTIVE | Noted: 2023-03-27

## 2023-10-11 PROBLEM — R74.8 HIGH LEVEL OF CARDIAC MARKER: Status: ACTIVE | Noted: 2023-10-11

## 2023-10-11 RX ORDER — METOPROLOL SUCCINATE 25 MG/1
1 TABLET, EXTENDED RELEASE ORAL DAILY
COMMUNITY
End: 2023-10-26 | Stop reason: SDUPTHER

## 2023-10-11 RX ORDER — DICLOFENAC SODIUM 10 MG/G
4 GEL TOPICAL 4 TIMES DAILY PRN
COMMUNITY
Start: 2023-08-19 | End: 2023-11-07 | Stop reason: SDUPTHER

## 2023-10-11 RX ORDER — SEMAGLUTIDE 1.34 MG/ML
1 INJECTION, SOLUTION SUBCUTANEOUS
COMMUNITY
Start: 2023-05-16 | End: 2023-10-26 | Stop reason: SDUPTHER

## 2023-10-11 RX ORDER — HYDROCHLOROTHIAZIDE 25 MG/1
25 TABLET ORAL
COMMUNITY
Start: 2017-08-22 | End: 2023-10-26

## 2023-10-11 RX ORDER — LANCETS 30 GAUGE
EACH MISCELLANEOUS
COMMUNITY
Start: 2022-12-21

## 2023-10-11 RX ORDER — MULTIVITAMIN
1 TABLET ORAL DAILY
COMMUNITY
End: 2023-10-26 | Stop reason: SDUPTHER

## 2023-10-11 RX ORDER — ONDANSETRON 8 MG/1
1 TABLET, ORALLY DISINTEGRATING ORAL EVERY 6 HOURS
COMMUNITY
Start: 2021-09-07 | End: 2023-10-26 | Stop reason: ALTCHOICE

## 2023-10-11 RX ORDER — METFORMIN HYDROCHLORIDE 500 MG/1
500 TABLET ORAL
COMMUNITY
Start: 2017-11-10 | End: 2023-10-26 | Stop reason: SDUPTHER

## 2023-10-11 RX ORDER — OXYCODONE AND ACETAMINOPHEN 5; 325 MG/1; MG/1
1-2 TABLET ORAL DAILY PRN
COMMUNITY
Start: 2020-05-25 | End: 2023-11-07 | Stop reason: SDUPTHER

## 2023-10-11 RX ORDER — DEXAMETHASONE 0.5 MG/5ML
ELIXIR ORAL
COMMUNITY
Start: 2021-08-11 | End: 2023-10-26 | Stop reason: ALTCHOICE

## 2023-10-11 RX ORDER — METFORMIN HYDROCHLORIDE 1000 MG/1
1 TABLET ORAL EVERY 12 HOURS
COMMUNITY
Start: 2021-12-31

## 2023-10-11 RX ORDER — LOSARTAN POTASSIUM 100 MG/1
1 TABLET ORAL DAILY
COMMUNITY
Start: 2017-08-22 | End: 2023-10-26

## 2023-10-11 RX ORDER — ZINC ACETATE 50 MG/1
CAPSULE ORAL
COMMUNITY
Start: 2022-10-27 | End: 2023-10-26 | Stop reason: SDUPTHER

## 2023-10-11 RX ORDER — ALBUTEROL SULFATE 90 UG/1
2 AEROSOL, METERED RESPIRATORY (INHALATION) EVERY 4 HOURS PRN
COMMUNITY
Start: 2020-07-12 | End: 2024-04-16 | Stop reason: ALTCHOICE

## 2023-10-11 RX ORDER — PANTOPRAZOLE SODIUM 20 MG/1
1 TABLET, DELAYED RELEASE ORAL DAILY
COMMUNITY

## 2023-10-11 RX ORDER — LOSARTAN POTASSIUM AND HYDROCHLOROTHIAZIDE 12.5; 1 MG/1; MG/1
1 TABLET ORAL DAILY
COMMUNITY
Start: 2023-07-14 | End: 2023-10-26

## 2023-10-11 RX ORDER — CARVEDILOL 25 MG/1
1 TABLET ORAL 2 TIMES DAILY
COMMUNITY
End: 2023-10-26

## 2023-10-11 RX ORDER — FLASH GLUCOSE SENSOR
1 KIT MISCELLANEOUS
COMMUNITY
Start: 2023-06-26

## 2023-10-11 RX ORDER — ASCORBIC ACID 500 MG
500 TABLET ORAL DAILY
COMMUNITY
Start: 2022-10-27

## 2023-10-11 RX ORDER — LANSOPRAZOLE 30 MG/1
1 CAPSULE, DELAYED RELEASE ORAL
COMMUNITY
End: 2023-10-26

## 2023-10-11 RX ORDER — SEMAGLUTIDE 1.34 MG/ML
1 INJECTION, SOLUTION SUBCUTANEOUS
COMMUNITY
Start: 2023-09-21

## 2023-10-11 RX ORDER — DILTIAZEM HYDROCHLORIDE 120 MG/1
CAPSULE, COATED, EXTENDED RELEASE ORAL
COMMUNITY
Start: 2021-04-28 | End: 2023-10-26

## 2023-10-11 RX ORDER — SEMAGLUTIDE 0.68 MG/ML
1 INJECTION, SOLUTION SUBCUTANEOUS WEEKLY
COMMUNITY
Start: 2023-09-09 | End: 2023-10-26 | Stop reason: SDUPTHER

## 2023-10-11 RX ORDER — ALBUTEROL SULFATE 0.83 MG/ML
2.5 SOLUTION RESPIRATORY (INHALATION) EVERY 4 HOURS PRN
COMMUNITY
End: 2024-04-16 | Stop reason: ALTCHOICE

## 2023-10-11 RX ORDER — MONTELUKAST SODIUM 10 MG/1
1 TABLET ORAL ONCE AS NEEDED
COMMUNITY

## 2023-10-11 RX ORDER — IPRATROPIUM BROMIDE AND ALBUTEROL SULFATE 2.5; .5 MG/3ML; MG/3ML
SOLUTION RESPIRATORY (INHALATION)
COMMUNITY
Start: 2021-09-19 | End: 2023-10-26 | Stop reason: ALTCHOICE

## 2023-10-11 RX ORDER — ZOLPIDEM TARTRATE 10 MG/1
1 TABLET ORAL NIGHTLY PRN
COMMUNITY

## 2023-10-11 RX ORDER — SACUBITRIL AND VALSARTAN 49; 51 MG/1; MG/1
1 TABLET, FILM COATED ORAL 2 TIMES DAILY
COMMUNITY
End: 2023-10-26 | Stop reason: ALTCHOICE

## 2023-10-11 RX ORDER — BLOOD SUGAR DIAGNOSTIC
STRIP MISCELLANEOUS
COMMUNITY
Start: 2017-11-15

## 2023-10-11 RX ORDER — EMPAGLIFLOZIN 10 MG/1
10 TABLET, FILM COATED ORAL DAILY
COMMUNITY
End: 2023-12-07 | Stop reason: SDUPTHER

## 2023-10-11 RX ORDER — PREGABALIN 100 MG/1
1 CAPSULE ORAL 2 TIMES DAILY
COMMUNITY
Start: 2019-11-25 | End: 2023-10-26

## 2023-10-11 RX ORDER — NYSTATIN 100000 [USP'U]/ML
SUSPENSION ORAL
COMMUNITY
Start: 2021-01-22 | End: 2023-10-26 | Stop reason: ALTCHOICE

## 2023-10-11 RX ORDER — PANTOPRAZOLE SODIUM 40 MG/1
40 TABLET, DELAYED RELEASE ORAL
COMMUNITY
Start: 2017-08-22 | End: 2023-10-26 | Stop reason: SDUPTHER

## 2023-10-11 RX ORDER — BUDESONIDE 0.5 MG/2ML
0.5 INHALANT ORAL
COMMUNITY
Start: 2021-09-19 | End: 2023-10-26 | Stop reason: ALTCHOICE

## 2023-10-11 RX ORDER — TESTOSTERONE CYPIONATE 200 MG/ML
200 INJECTION, SOLUTION INTRAMUSCULAR ONCE AS NEEDED
COMMUNITY
Start: 2020-05-22

## 2023-10-11 RX ORDER — SUCRALFATE 1 G/1
1 TABLET ORAL
COMMUNITY
Start: 2020-07-17 | End: 2023-10-26

## 2023-10-11 RX ORDER — NAPROXEN SODIUM 220 MG/1
81 TABLET, FILM COATED ORAL DAILY
COMMUNITY
Start: 2022-10-27

## 2023-10-11 RX ORDER — HYDROCHLOROTHIAZIDE 50 MG/1
1 TABLET ORAL DAILY
COMMUNITY
End: 2023-10-26

## 2023-10-11 RX ORDER — TADALAFIL 20 MG/1
1 TABLET ORAL AS NEEDED
COMMUNITY
Start: 2022-03-25

## 2023-10-11 RX ORDER — PANTOPRAZOLE SODIUM 40 MG/1
1 TABLET, DELAYED RELEASE ORAL DAILY
COMMUNITY
End: 2023-10-26 | Stop reason: SDUPTHER

## 2023-10-11 RX ORDER — ZINC GLUCONATE 50 MG
1 TABLET ORAL DAILY
COMMUNITY

## 2023-10-11 RX ORDER — ACETAMINOPHEN 500 MG
50 TABLET ORAL
COMMUNITY
Start: 2022-10-27

## 2023-10-11 RX ORDER — SACUBITRIL AND VALSARTAN 97; 103 MG/1; MG/1
TABLET, FILM COATED ORAL
COMMUNITY
End: 2023-11-16 | Stop reason: SDUPTHER

## 2023-10-11 RX ORDER — METHYLPREDNISOLONE 4 MG
500 TABLET, DOSE PACK ORAL 2 TIMES DAILY
COMMUNITY
Start: 2022-10-27

## 2023-10-11 RX ORDER — AMLODIPINE BESYLATE 5 MG/1
5 TABLET ORAL DAILY
COMMUNITY
End: 2023-10-26

## 2023-10-12 ENCOUNTER — APPOINTMENT (OUTPATIENT)
Dept: CARDIOLOGY | Facility: CLINIC | Age: 53
End: 2023-10-12
Payer: COMMERCIAL

## 2023-10-19 ENCOUNTER — TELEPHONE (OUTPATIENT)
Dept: CARDIOLOGY | Facility: CLINIC | Age: 53
End: 2023-10-19
Payer: COMMERCIAL

## 2023-10-19 NOTE — TELEPHONE ENCOUNTER
Pt wife Nataliya called stating pt heart rate and bp is still elevated despite new meds given at last OV.  Pt was advised to stop coreg and start metoprolol 25 mg every day along with Jardiance and Entresto.  Nataliya states pt BP is in the 140's/100 and heart rate is rarely under 100.  Even wakes patient up at night.  Pt has fu on 11/2.

## 2023-10-25 NOTE — PROGRESS NOTES
Cardiology Subsequent Encounter Clinic Note  Name: Sd Andino  MRN: [id number]  : 1970    CC: HFrEF    Active Issues:  Sd Andino is a 53 y.o. male with a medical history of hypertension, here for evaluation of the following complaints:     Patient was initially evaluated due to acute attacks of dyspnea lasting a few seconds while sitting. Had a prolonged hospitalization -2021 due to COVID-19 induced acute hypoxic respiratory failure; he was admitted to [facility] and required intubation at this time.    Echocardiogram performed 2023 showed moderately reduced ejection fraction of around 35%. Regadenoson stress test was negative for any ischemia. Event monitor showed no sustained tachyarrhythmias.     Cardiac issues currently include:    ACC/AHA stage C HFrEF  -Echocardiogram performed 2023 showed a moderately reduced ejection fraction 35%.  Now on full dose Entresto, Jardiance 10 mg, Toprol 25 mg daily  -Patient reports dyspnea with moderate exertion; however that has improved since starting GDMT.  He denies any clear chest heaviness but continues to have significant palpitations with fluctuations in his heart rate (as noted on a blood pressure cuff and his fitness tracker).  Also notices frequency; and occasional burning with the initiation of urination.  No fever/chills.  No orthopnea/PND.  Does not appear to have any lower extremity edema.  -Notably event monitor performed 2023 showed a 14% burden of premature atrial contractions but no sustained tachyarrhythmias.      Past Medical History  Past Medical History:   Diagnosis Date    Personal history of other diseases of the circulatory system 2019    History of essential hypertension    Type 2 diabetes mellitus without complications (CMS/[facility])     Type 2 diabetes mellitus without complication, without long-term current use of insulin       Past Surgical History  Past Surgical  History:   Procedure Laterality Date    CT NECK ANGIO W AND WO IV CONTRAST  2/9/2023    CT NECK ANGIO W AND WO IV CONTRAST 2/9/2023 DOCTOR OFFICE [facility]GAC[facility]    MR NECK ANGIO WO IV CONTRAST  2/9/2023    MR NECK ANGIO WO IV CONTRAST 2/9/2023 DOCTOR OFFICE LEGACY    OTHER SURGICAL HISTORY  10/10/2019    Medial branch block    OTHER SURGICAL HISTORY  10/10/2019    Epidural steroid injection    OTHER SURGICAL HISTORY  10/10/2019    Intra-articular corticosteroid injection    OTHER SURGICAL HISTORY  10/10/2019    Intra-articular corticosteroid injection    OTHER SURGICAL HISTORY  10/10/2019    Epidural steroid injection    OTHER SURGICAL HISTORY  10/10/2019    Epidural steroid injection    OTHER SURGICAL HISTORY  10/14/2019    Medial branch block    OTHER SURGICAL HISTORY  11/13/2020    Epidural steroid injection    OTHER SURGICAL HISTORY  02/28/2022    Epidural steroid injection    OTHER SURGICAL HISTORY  06/07/2018    Knee Arthrodesis Left       Medications  Current Outpatient Medications on File Prior to Visit   Medication Sig Dispense Refill    albuterol 2.5 mg /3 mL (0.083 %) nebulizer solution Take 3 mL (2.5 mg) by nebulization every 4 hours if needed.      albuterol 90 mcg/actuation inhaler Inhale 2 puffs every 4 hours if needed.      ascorbic acid (Vitamin C) 500 mg tablet Take 1 tablet (500 mg) by mouth once daily.      aspirin 81 mg chewable tablet Chew 1 tablet (81 mg) once daily.      blood sugar diagnostic (Accu-Chek Alicia Plus test strp) strip Accu-Chek Alicia Plus In Vitro Strip   Refills: 0        Start : 15-Nov-2017   Active      cholecalciferol (Vitamin D-3) 50 mcg (2,000 unit) capsule Take 1 capsule (50 mcg) by mouth.      diclofenac sodium (Voltaren) 1 % gel gel Apply 1 Application topically 4 times a day as needed.      Entresto  mg tablet       FreeStyle Romi sensor system (FreeStyle Romi 2 Sensor) kit Inject 1 each under the skin.      glucosamine sulfate 500 mg tablet Take 500 mg by mouth 2  "times a day.      Jardiance 10 mg Take 1 tablet (10 mg) by mouth once daily.      LANCETS-BLOOD GLUCOSE STRIPS MISC Use to check blood sugar 4 times daily      metFORMIN (Glucophage) 1,000 mg tablet Take 1 tablet (1,000 mg) by mouth every 12 hours.      metoprolol succinate XL (Toprol-XL) 25 mg 24 hr tablet Take 1 tablet (25 mg) by mouth once daily.      montelukast (Singulair) 10 mg tablet Take 1 tablet (10 mg) by mouth 1 time if needed. nightly      multivit-minerals/folic/ginkgo (DAILY MULTIPLE ORAL) Take 1 tablet by mouth once daily.      omega3/dha/epa/fish oil/vit D3 (OMEGA-3 PLUS VITAMIN D3 ORAL) Take 1 capsule by mouth 2 times a day.      OneTouch Verio Flex meter misc USE AS DIRECTED.      oxyCODONE-acetaminophen (Percocet) 5-325 mg tablet Take 1-2 tablets by mouth once daily as needed.      pantoprazole (ProtoNix) 20 mg EC tablet Take 1 tablet (20 mg) by mouth once daily.      semaglutide (Ozempic) 1 mg/dose (2 mg/1.5 mL) pen injector Inject 1 mg under the skin 1 (one) time per week.      syr,ndl,ins,safe 0.5mL,disp un (INSULIN SYRINGE-NEEDLE,DISPOS. MISC) USE ONE SYRINGE ONCE A WEEK      syringe with needle 3 mL 25 gauge x 1\" syringe USE ONE SYRINGE ONCE A WEEK      tadalafil 20 mg tablet Take 1 tablet (20 mg) by mouth if needed.      testosterone cypionate (Depo-Testosterone) 200 mg/mL injection 1 mL (200 mg) 1 time if needed. Every 10 days      zinc gluconate 50 mg tablet Take 1 tablet (50 mg) by mouth once daily.      zolpidem (Ambien) 10 mg tablet Take 1 tablet (10 mg) by mouth as needed at bedtime.      [DISCONTINUED] Entresto 49-51 mg tablet Take 1 tablet by mouth 2 times a day.      [DISCONTINUED] GABAPENTIN ORAL Take 500 mg by mouth twice a day.      [DISCONTINUED] Ozempic 0.25 mg or 0.5 mg (2 mg/3 mL) pen injector Inject 1 mg under the skin 1 (one) time per week in the early morning..      [DISCONTINUED] Ozempic 1 mg/dose (4 mg/3 mL) pen injector Inject 1 mg under the skin 1 (one) time per week.  "     [DISCONTINUED] zinc acetate (Galzin) 50 mg (zinc) capsule       [DISCONTINUED] amLODIPine (Norvasc) 5 mg tablet Take 1 tablet (5 mg) by mouth once daily.      [DISCONTINUED] blood sugar diagnostic strip Use to test blood sugar four times daily      [DISCONTINUED] budesonide (Pulmicort) 0.5 mg/2 mL nebulizer solution Take 2 mL (0.5 mg) by nebulization.      [DISCONTINUED] carvedilol (Coreg) 25 mg tablet Take 1 tablet (25 mg) by mouth 2 times a day.      [DISCONTINUED] dexAMETHasone 0.5 mg/5 mL oral liquid Take by mouth.      [DISCONTINUED] dilTIAZem CD (dilTIAZem ER) 120 mg 24 hr capsule Take by mouth.      [DISCONTINUED] hydroCHLOROthiazide (HYDRODiuril) 25 mg tablet Take 1 tablet (25 mg) by mouth.      [DISCONTINUED] hydroCHLOROthiazide (HYDRODiuril) 50 mg tablet Take 1 tablet (50 mg) by mouth once daily.      [DISCONTINUED] ipratropium-albuteroL (Duo-Neb) 0.5-2.5 mg/3 mL nebulizer solution       [DISCONTINUED] lansoprazole (Prevacid) 30 mg DR capsule Take 1 capsule (30 mg) by mouth once daily in the evening.  Take before meals.      [DISCONTINUED] losartan (Cozaar) 100 mg tablet Take 1 tablet (100 mg) by mouth once daily.      [DISCONTINUED] losartan-hydrochlorothiazide (Hyzaar) 100-12.5 mg tablet Take 1 tablet by mouth once daily.      [DISCONTINUED] metFORMIN (Glucophage) 500 mg tablet Take 1 tablet (500 mg) by mouth.      [DISCONTINUED] multivitamin tablet Take 1 tablet by mouth once daily.      [DISCONTINUED] multivitamin with minerals (MULTIPLE VITAMIN-MINERALS ORAL) Take 1 tablet by mouth once daily.      [DISCONTINUED] multivitamin with minerals (multivitamin) tablet Take 1 tablet by mouth once daily.      [DISCONTINUED] nystatin (Mycostatin) 100,000 unit/mL suspension Take by mouth.      [DISCONTINUED] ondansetron ODT (Zofran-ODT) 8 mg disintegrating tablet Take 1 tablet (8 mg) by mouth every 6 hours.      [DISCONTINUED] pantoprazole (ProtoNix) 40 mg EC tablet Take 1 tablet (40 mg) by mouth.       "[DISCONTINUED] pantoprazole (Protonix) 40 mg EC tablet Take 1 tablet (40 mg) by mouth once daily.      [DISCONTINUED] pregabalin (Lyrica) 100 mg capsule Take 1 capsule (100 mg) by mouth twice a day.      [DISCONTINUED] rivaroxaban (Xarelto) 10 mg tablet Take 1 tablet (10 mg) by mouth.      [DISCONTINUED] SITagliptin phosphate (Januvia) 100 mg tablet Take 1 tablet (100 mg) by mouth once daily.      [DISCONTINUED] sucralfate (Carafate) 1 gram tablet Take 1 tablet (1 g) by mouth.       No current facility-administered medications on file prior to visit.       Allergies  No Known Allergies    Social History  Social History     Tobacco Use    Smoking status: Never    Smokeless tobacco: Never   Substance Use Topics    Alcohol use: Yes     Comment: 10 beers yearly    Drug use: Never       Family History  Family History   Problem Relation Name Age of Onset    Hypertension Mother      Hypertension Father      Heart attack Other GRANDPARENT     Stroke Other GRANDPARENT        Physical Examination  Vitals: /90   Pulse 54   Ht 1.778 m (5' 10\")   Wt 96.3 kg (212 lb 4.8 oz)   SpO2 96%   BMI 30.46 kg/m²   General: awake, alert and oriented. No acute distress.   Skin: Skin is warm, dry and intact without rashes or lesions. Appropriate color for ethnicity. Nail beds pink with no cyanosis or clubbing  HEENT: normocephalic, atraumatic; conjunctivae are clear without exudates or hemorrhage. Sclera is non-icteric. Eyelids are normal in appearance without swelling or lesions. Hearing intact. Nares are patent bilaterally. Moist mucous membranes.   Cardiovascular: Regular. No murmurs, gallops, or rubs are auscultated. S1 and S2 are heard and are of normal intensity. No JVD, no carotid bruits  Respiratory: Thorax symmetric. CTAB, breath sounds vesicular. No crackles, wheezes or ronchi.   Gastrointestinal: soft, non-distended, BS + x 4  Genitourinary: exam deferred  Musculoskeletal: moves all extremities  Extremities: pulses " palpable bilaterally; no swelling or erythema; no edema  Neurological: alert & oriented x 3; no focal deficits  Psychiatric: appropriate mood and affect       Labs/Imaging/Procedures    Lab Results   Component Value Date    HGB 16.4 08/29/2023    HGB 14.9 06/21/2023    HGB 13.0 (L) 02/12/2023     08/29/2023    WBC 10.1 08/29/2023     09/29/2023    K 3.9 09/29/2023    CREATININE 1.05 09/29/2023    CREATININE 1.19 09/15/2023    CREATININE 1.11 08/29/2023    BUN 18 09/29/2023    CALCIUM 8.7 09/29/2023    INR 1.1 02/10/2023     (H) 08/29/2023    TROPHS 22 (H) 08/29/2023    TROPHS 24 (H) 08/29/2023    TROPHS 7 02/11/2023    LDLF 102 (H) 04/12/2022     No echocardiogram results found for the past 12 months  Echocardiogram 9/8/2023:  CONCLUSIONS:  1. Left ventricular systolic function is moderately decreased with a 35-40% estimated ejection fraction.  2. There is global hypokinesis of the left ventricle with minor regional variations.    Stress test 9/8/2023:    1. Negative myocardial perfusion study without evidence of inducible  myocardial ischemia or prior infarction.Predominantly fixed  small-sized moderate perfusion defect involving apex, likely  representing apical thinning, a normal variant.  2. Mild dilatation of left ventricle.  3. Severe globally hypokinesis with a markedly decreased post-stress  LV EF estimated at 29%, correlate clinically.    Impression  Sd Andino is a 53 y.o. male with a medical history of hypertension, here for evaluation of the following complaints:     Patient was initially evaluated due to acute attacks of dyspnea lasting a few seconds while sitting. Had a prolonged hospitalization September 8-September 19, 2021 due to COVID-19 induced acute hypoxic respiratory failure; he was admitted to [facility] and required intubation at this time.    Echocardiogram performed September 8, 2023 showed moderately reduced ejection fraction of around 35%. Regadenoson  stress test was negative for any ischemia. Event monitor showed no sustained tachyarrhythmias.     Cardiac issues currently include:    ACC/AHA stage C HFrEF  -Echocardiogram performed 9/8/2023 showed a moderately reduced ejection fraction 35%.  Now on full dose Entresto, Jardiance 10 mg, Toprol 25 mg daily  -Patient reports dyspnea with moderate exertion; however that has improved since starting GDMT.  He denies any clear chest heaviness but continues to have significant palpitations with fluctuations in his heart rate (as noted on a blood pressure cuff and his fitness tracker).  Also notices frequency; and occasional burning with the initiation of urination.  No fever/chills.  No orthopnea/PND.  Does not appear to have any lower extremity edema.  -Notably event monitor performed August 2023 showed a 14% burden of premature atrial contractions but no sustained tachyarrhythmias.    Plan  -We will increase the metoprolol succinate to 50 mg daily, add Aldactone 12.5 mg daily  -Labs in a week.  (BMP).  We will check a UA to rule out underlying UTI given initiation of Jardiance; although his symptoms do not appear to be classic for a UTI.  -Uncertain what these palpitations were present; possibly may be symptomatic PACs  -RTC 6 weeks    Richy Stokes MD  Advanced Heart Failure/Transplant Cardiology  Cardio-Oncology  [facility]

## 2023-10-26 ENCOUNTER — OFFICE VISIT (OUTPATIENT)
Dept: CARDIOLOGY | Facility: CLINIC | Age: 53
End: 2023-10-26
Payer: COMMERCIAL

## 2023-10-26 VITALS
HEIGHT: 70 IN | OXYGEN SATURATION: 96 % | BODY MASS INDEX: 30.39 KG/M2 | DIASTOLIC BLOOD PRESSURE: 90 MMHG | SYSTOLIC BLOOD PRESSURE: 122 MMHG | HEART RATE: 54 BPM | WEIGHT: 212.3 LBS

## 2023-10-26 DIAGNOSIS — R00.2 PALPITATIONS: ICD-10-CM

## 2023-10-26 DIAGNOSIS — R74.8 HIGH LEVEL OF CARDIAC MARKER: ICD-10-CM

## 2023-10-26 DIAGNOSIS — I50.22 CHRONIC SYSTOLIC HEART FAILURE (MULTI): Primary | ICD-10-CM

## 2023-10-26 PROCEDURE — 3051F HG A1C>EQUAL 7.0%<8.0%: CPT | Performed by: STUDENT IN AN ORGANIZED HEALTH CARE EDUCATION/TRAINING PROGRAM

## 2023-10-26 PROCEDURE — 93000 ELECTROCARDIOGRAM COMPLETE: CPT | Performed by: STUDENT IN AN ORGANIZED HEALTH CARE EDUCATION/TRAINING PROGRAM

## 2023-10-26 PROCEDURE — 3080F DIAST BP >= 90 MM HG: CPT | Performed by: STUDENT IN AN ORGANIZED HEALTH CARE EDUCATION/TRAINING PROGRAM

## 2023-10-26 PROCEDURE — 1036F TOBACCO NON-USER: CPT | Performed by: STUDENT IN AN ORGANIZED HEALTH CARE EDUCATION/TRAINING PROGRAM

## 2023-10-26 PROCEDURE — 99214 OFFICE O/P EST MOD 30 MIN: CPT | Performed by: STUDENT IN AN ORGANIZED HEALTH CARE EDUCATION/TRAINING PROGRAM

## 2023-10-26 PROCEDURE — 3074F SYST BP LT 130 MM HG: CPT | Performed by: STUDENT IN AN ORGANIZED HEALTH CARE EDUCATION/TRAINING PROGRAM

## 2023-10-26 RX ORDER — METOPROLOL SUCCINATE 25 MG/1
50 TABLET, EXTENDED RELEASE ORAL DAILY
Qty: 180 TABLET | Refills: 3 | Status: SHIPPED | OUTPATIENT
Start: 2023-10-26 | End: 2023-10-30 | Stop reason: SDUPTHER

## 2023-10-26 RX ORDER — SPIRONOLACTONE 25 MG/1
12.5 TABLET ORAL DAILY
Qty: 45 TABLET | Refills: 3 | Status: SHIPPED | OUTPATIENT
Start: 2023-10-26 | End: 2023-11-16 | Stop reason: SDUPTHER

## 2023-10-30 ENCOUNTER — TELEPHONE (OUTPATIENT)
Dept: CARDIOLOGY | Facility: CLINIC | Age: 53
End: 2023-10-30
Payer: COMMERCIAL

## 2023-10-30 DIAGNOSIS — I50.22 CHRONIC SYSTOLIC HEART FAILURE (MULTI): ICD-10-CM

## 2023-10-30 RX ORDER — METOPROLOL SUCCINATE 25 MG/1
50 TABLET, EXTENDED RELEASE ORAL DAILY
Qty: 180 TABLET | Refills: 3 | Status: SHIPPED | OUTPATIENT
Start: 2023-10-30 | End: 2023-12-07 | Stop reason: SDUPTHER

## 2023-10-30 NOTE — TELEPHONE ENCOUNTER
GISSELLE CALLED FOR HER MAURICIO BECAUSE WHEN THE WENT TO THE PHARMACY TO  HIS PRESCRIPTION FOR SPIRONOLACTONE, THE PHARMACIST WAS WORRIED ABOUT A REACTION WITH THIS ENTRESTRO.  GADIEL VERIFIED THAT IT WAS FINE TO TAKE BOTH.    THEY DO NEED THE METOPROL SENT TO [facility] W/ A 90 SUPPLY.  IT'S NOT COVERED AT THEIR LOCAL PHARMACY.

## 2023-11-02 ENCOUNTER — APPOINTMENT (OUTPATIENT)
Dept: CARDIOLOGY | Facility: CLINIC | Age: 53
End: 2023-11-02
Payer: COMMERCIAL

## 2023-11-03 ENCOUNTER — TELEPHONE (OUTPATIENT)
Dept: PAIN MEDICINE | Facility: CLINIC | Age: 53
End: 2023-11-03
Payer: COMMERCIAL

## 2023-11-03 DIAGNOSIS — M54.12 CERVICAL RADICULITIS: Primary | ICD-10-CM

## 2023-11-03 DIAGNOSIS — M47.812 SPONDYLOSIS OF CERVICAL JOINT WITHOUT MYELOPATHY: ICD-10-CM

## 2023-11-07 RX ORDER — DICLOFENAC SODIUM 10 MG/G
4 GEL TOPICAL 4 TIMES DAILY PRN
Qty: 100 G | Refills: 1 | Status: SHIPPED | OUTPATIENT
Start: 2023-11-07 | End: 2023-12-19 | Stop reason: SDUPTHER

## 2023-11-07 RX ORDER — OXYCODONE AND ACETAMINOPHEN 5; 325 MG/1; MG/1
1-2 TABLET ORAL DAILY PRN
Qty: 60 TABLET | Refills: 0 | Status: SHIPPED | OUTPATIENT
Start: 2023-11-07 | End: 2023-12-19 | Stop reason: SDUPTHER

## 2023-11-16 DIAGNOSIS — I50.22 CHRONIC SYSTOLIC HEART FAILURE (MULTI): ICD-10-CM

## 2023-11-16 RX ORDER — SPIRONOLACTONE 25 MG/1
12.5 TABLET ORAL DAILY
Qty: 45 TABLET | Refills: 3 | Status: SHIPPED | OUTPATIENT
Start: 2023-11-16 | End: 2024-11-15

## 2023-11-16 RX ORDER — SACUBITRIL AND VALSARTAN 97; 103 MG/1; MG/1
TABLET, FILM COATED ORAL
Qty: 180 TABLET | Refills: 3 | Status: SHIPPED | OUTPATIENT
Start: 2023-11-16

## 2023-11-26 ENCOUNTER — TRANSCRIBE ORDERS (OUTPATIENT)
Dept: OPERATING ROOM | Facility: HOSPITAL | Age: 53
End: 2023-11-26
Payer: COMMERCIAL

## 2023-11-26 ENCOUNTER — HOSPITAL ENCOUNTER (OUTPATIENT)
Facility: HOSPITAL | Age: 53
Setting detail: OUTPATIENT SURGERY
End: 2023-11-26
Attending: ORTHOPAEDIC SURGERY | Admitting: ORTHOPAEDIC SURGERY
Payer: COMMERCIAL

## 2023-11-26 DIAGNOSIS — M17.12 UNILATERAL PRIMARY OSTEOARTHRITIS, LEFT KNEE: Primary | ICD-10-CM

## 2023-11-28 ENCOUNTER — LAB (OUTPATIENT)
Dept: LAB | Facility: LAB | Age: 53
End: 2023-11-28
Payer: COMMERCIAL

## 2023-11-28 DIAGNOSIS — I50.22 CHRONIC SYSTOLIC HEART FAILURE (MULTI): ICD-10-CM

## 2023-11-28 DIAGNOSIS — R74.8 HIGH LEVEL OF CARDIAC MARKER: ICD-10-CM

## 2023-11-28 LAB
ANION GAP SERPL CALC-SCNC: 12 MMOL/L (ref 10–20)
APPEARANCE UR: CLEAR
BILIRUB UR STRIP.AUTO-MCNC: NEGATIVE MG/DL
BUN SERPL-MCNC: 24 MG/DL (ref 6–23)
CALCIUM SERPL-MCNC: 8.8 MG/DL (ref 8.6–10.3)
CHLORIDE SERPL-SCNC: 106 MMOL/L (ref 98–107)
CO2 SERPL-SCNC: 25 MMOL/L (ref 21–32)
COLOR UR: YELLOW
CREAT SERPL-MCNC: 0.99 MG/DL (ref 0.5–1.3)
GFR SERPL CREATININE-BSD FRML MDRD: >90 ML/MIN/1.73M*2
GLUCOSE SERPL-MCNC: 168 MG/DL (ref 74–99)
GLUCOSE UR STRIP.AUTO-MCNC: ABNORMAL MG/DL
KETONES UR STRIP.AUTO-MCNC: ABNORMAL MG/DL
LEUKOCYTE ESTERASE UR QL STRIP.AUTO: NEGATIVE
MUCOUS THREADS #/AREA URNS AUTO: NORMAL /LPF
NITRITE UR QL STRIP.AUTO: NEGATIVE
PH UR STRIP.AUTO: 5 [PH]
POTASSIUM SERPL-SCNC: 3.8 MMOL/L (ref 3.5–5.3)
PROT UR STRIP.AUTO-MCNC: ABNORMAL MG/DL
RBC # UR STRIP.AUTO: NEGATIVE /UL
RBC #/AREA URNS AUTO: NORMAL /HPF
SODIUM SERPL-SCNC: 139 MMOL/L (ref 136–145)
SP GR UR STRIP.AUTO: 1.02
UROBILINOGEN UR STRIP.AUTO-MCNC: <2 MG/DL
WBC #/AREA URNS AUTO: NORMAL /HPF

## 2023-11-28 PROCEDURE — 80048 BASIC METABOLIC PNL TOTAL CA: CPT

## 2023-11-28 PROCEDURE — 81001 URINALYSIS AUTO W/SCOPE: CPT

## 2023-11-28 PROCEDURE — 36415 COLL VENOUS BLD VENIPUNCTURE: CPT

## 2023-12-06 NOTE — PROGRESS NOTES
Cardiology Subsequent Encounter Clinic Note  Name: Sd Andino  MRN: [id number]  : 1970    CC: HFrEF    Active Issues:  Sd Andino is a 53 y.o. male with a medical history of hypertension, here for evaluation of the following complaints:     Patient was initially evaluated due to acute attacks of dyspnea lasting a few seconds while sitting. Had a prolonged hospitalization -2021 due to COVID-19 induced acute hypoxic respiratory failure; he was admitted to [facility] and required intubation at this time.    Echocardiogram performed 2023 showed moderately reduced ejection fraction of around 35%. Regadenoson stress test was negative for any ischemia. Event monitor showed no sustained tachyarrhythmias.     Cardiac issues currently include:    ACC/AHA stage C HFrEF  -Echocardiogram performed 2023 showed a moderately reduced ejection fraction 35%.  Now on full dose Entresto, Jardiance 10 mg, Toprol 2 mg daily, lactone 12.5 mg daily  -Patient reports dyspnea with moderate exertion; however that has improved since starting GDMT.  He denies any clear chest heaviness but continues to have significant palpitations with fluctuations in his heart rate (as noted on a blood pressure cuff and his fitness tracker).  Also notices frequency; and occasional burning with the initiation of urination.  No fever/chills.  No orthopnea/PND.  Does not appear to have any lower extremity edema.  -Notably event monitor performed 2023 showed a 14% burden of premature atrial contractions but no sustained tachyarrhythmias.    Since addition of Aldactone and uptitration of metoprolol he has not noted any significant changes.    Past Medical History  Past Medical History:   Diagnosis Date    Personal history of other diseases of the circulatory system 2019    History of essential hypertension    Type 2 diabetes mellitus without complications (CMS/[facility])     Type 2 diabetes  mellitus without complication, without long-term current use of insulin       Past Surgical History  Past Surgical History:   Procedure Laterality Date    CT ANGIO NECK  2/9/2023    CT NECK ANGIO W AND WO IV CONTRAST 2/9/2023 DOCTOR OFFICE [facility]    MR NECK ANGIO WO IV CONTRAST  2/9/2023    MR NECK ANGIO WO IV CONTRAST 2/9/2023 DOCTOR OFFICE LEGACY    OTHER SURGICAL HISTORY  10/10/2019    Medial branch block    OTHER SURGICAL HISTORY  10/10/2019    Epidural steroid injection    OTHER SURGICAL HISTORY  10/10/2019    Intra-articular corticosteroid injection    OTHER SURGICAL HISTORY  10/10/2019    Intra-articular corticosteroid injection    OTHER SURGICAL HISTORY  10/10/2019    Epidural steroid injection    OTHER SURGICAL HISTORY  10/10/2019    Epidural steroid injection    OTHER SURGICAL HISTORY  10/14/2019    Medial branch block    OTHER SURGICAL HISTORY  11/13/2020    Epidural steroid injection    OTHER SURGICAL HISTORY  02/28/2022    Epidural steroid injection    OTHER SURGICAL HISTORY  06/07/2018    Knee Arthrodesis Left       Medications  Current Outpatient Medications on File Prior to Visit   Medication Sig Dispense Refill    aspirin 81 mg chewable tablet Chew 1 tablet (81 mg) once daily.      blood sugar diagnostic (Accu-Chek Alicia Plus test strp) strip Accu-Chek Alicia Plus In Vitro Strip   Refills: 0        Start : 15-Nov-2017   Active      cholecalciferol (Vitamin D-3) 50 mcg (2,000 unit) capsule Take 1 capsule (50 mcg) by mouth.      diclofenac sodium (Voltaren) 1 % gel gel Apply 1 Application topically 4 times a day as needed (as needed for pain). 100 g 1    FreeStyle Romi sensor system (FreeStyle Romi 2 Sensor) kit Inject 1 each under the skin.      Gemtesa 75 mg tablet Take 1 tablet (75 mg) by mouth once daily.      glucosamine sulfate 500 mg tablet Take 500 mg by mouth 2 times a day.      Jardiance 10 mg Take 1 tablet (10 mg) by mouth once daily.      LANCETS-BLOOD GLUCOSE STRIPS MISC Use to check  "blood sugar 4 times daily      metFORMIN (Glucophage) 1,000 mg tablet Take 1 tablet (1,000 mg) by mouth every 12 hours.      metoprolol succinate XL (Toprol-XL) 25 mg 24 hr tablet Take 2 tablets (50 mg) by mouth once daily. 180 tablet 3    montelukast (Singulair) 10 mg tablet Take 1 tablet (10 mg) by mouth 1 time if needed. nightly      multivit-minerals/folic/ginkgo (DAILY MULTIPLE ORAL) Take 1 tablet by mouth once daily.      omega3/dha/epa/fish oil/vit D3 (OMEGA-3 PLUS VITAMIN D3 ORAL) Take 1 capsule by mouth 2 times a day.      OneTouch Verio Flex meter misc USE AS DIRECTED.      oxyCODONE-acetaminophen (Percocet) 5-325 mg tablet Take 1-2 tablets by mouth once daily as needed for severe pain (7 - 10). 60 tablet 0    pantoprazole (ProtoNix) 20 mg EC tablet Take 1 tablet (20 mg) by mouth once daily.      sacubitriL-valsartan (Entresto)  mg tablet One tablet twice daily 180 tablet 3    semaglutide (Ozempic) 1 mg/dose (2 mg/1.5 mL) pen injector Inject 1 mg under the skin 1 (one) time per week.      spironolactone (Aldactone) 25 mg tablet Take 0.5 tablets (12.5 mg) by mouth once daily. 45 tablet 3    syr,ndl,ins,safe 0.5mL,disp un (INSULIN SYRINGE-NEEDLE,DISPOS. MISC) USE ONE SYRINGE ONCE A WEEK      syringe with needle 3 mL 25 gauge x 1\" syringe USE ONE SYRINGE ONCE A WEEK      tadalafil 20 mg tablet Take 1 tablet (20 mg) by mouth if needed.      testosterone cypionate (Depo-Testosterone) 200 mg/mL injection 1 mL (200 mg) 1 time if needed. Every 10 days      zinc gluconate 50 mg tablet Take 1 tablet (50 mg) by mouth once daily.      zolpidem (Ambien) 10 mg tablet Take 1 tablet (10 mg) by mouth as needed at bedtime.      albuterol 2.5 mg /3 mL (0.083 %) nebulizer solution Take 3 mL (2.5 mg) by nebulization every 4 hours if needed.      albuterol 90 mcg/actuation inhaler Inhale 2 puffs every 4 hours if needed.      ascorbic acid (Vitamin C) 500 mg tablet Take 1 tablet (500 mg) by mouth once daily.       No " "current facility-administered medications on file prior to visit.       Allergies  No Known Allergies    Social History  Social History     Tobacco Use    Smoking status: Never    Smokeless tobacco: Never   Substance Use Topics    Alcohol use: Yes     Comment: 10 beers yearly    Drug use: Never       Family History  Family History   Problem Relation Name Age of Onset    Hypertension Mother      Hypertension Father      Heart attack Other GRANDPARENT     Stroke Other GRANDPARENT        Physical Examination  Vitals: /88 (BP Location: Left arm, Patient Position: Sitting)   Pulse (!) 115   Ht 1.778 m (5' 10\")   Wt 95.3 kg (210 lb)   SpO2 97%   BMI 30.13 kg/m²   General: awake, alert and oriented. No acute distress.   Skin: Skin is warm, dry and intact without rashes or lesions. Appropriate color for ethnicity. Nail beds pink with no cyanosis or clubbing  HEENT: normocephalic, atraumatic; conjunctivae are clear without exudates or hemorrhage. Sclera is non-icteric. Eyelids are normal in appearance without swelling or lesions. Hearing intact. Nares are patent bilaterally. Moist mucous membranes.   Cardiovascular: Regular. No murmurs, gallops, or rubs are auscultated. S1 and S2 are heard and are of normal intensity. No JVD, no carotid bruits  Respiratory: Thorax symmetric. CTAB, breath sounds vesicular. No crackles, wheezes or ronchi.   Gastrointestinal: soft, non-distended, BS + x 4  Genitourinary: exam deferred  Musculoskeletal: moves all extremities  Extremities: pulses palpable bilaterally; no swelling or erythema; no edema  Neurological: alert & oriented x 3; no focal deficits  Psychiatric: appropriate mood and affect       Labs/Imaging/Procedures    Lab Results   Component Value Date    HGB 16.4 08/29/2023    HGB 14.9 06/21/2023    HGB 13.0 (L) 02/12/2023     08/29/2023    WBC 10.1 08/29/2023     11/28/2023    K 3.8 11/28/2023    CREATININE 0.99 11/28/2023    CREATININE 1.05 09/29/2023    " CREATININE 1.19 09/15/2023    CREATININE 1.11 08/29/2023    BUN 24 (H) 11/28/2023    CALCIUM 8.8 11/28/2023    INR 1.1 02/10/2023     (H) 08/29/2023    TROPHS 22 (H) 08/29/2023    TROPHS 24 (H) 08/29/2023    TROPHS 7 02/11/2023    LDLF 102 (H) 04/12/2022     No echocardiogram results found for the past 12 months  Echocardiogram 9/8/2023:  CONCLUSIONS:  1. Left ventricular systolic function is moderately decreased with a 35-40% estimated ejection fraction.  2. There is global hypokinesis of the left ventricle with minor regional variations.    Stress test 9/8/2023:    1. Negative myocardial perfusion study without evidence of inducible  myocardial ischemia or prior infarction.Predominantly fixed  small-sized moderate perfusion defect involving apex, likely  representing apical thinning, a normal variant.  2. Mild dilatation of left ventricle.  3. Severe globally hypokinesis with a markedly decreased post-stress  LV EF estimated at 29%, correlate clinically.    Impression  Sd Andino is a 53 y.o. male with a medical history of hypertension, here for evaluation of the following complaints:     Patient was initially evaluated due to acute attacks of dyspnea lasting a few seconds while sitting. Had a prolonged hospitalization September 8-September 19, 2021 due to COVID-19 induced acute hypoxic respiratory failure; he was admitted to [facility] and required intubation at this time.    Echocardiogram performed September 8, 2023 showed moderately reduced ejection fraction of around 35%. Regadenoson stress test was negative for any ischemia. Event monitor showed no sustained tachyarrhythmias.     Cardiac issues currently include:      ACC/AHA stage C HFrEF  -Echocardiogram performed 9/8/2023 showed a moderately reduced ejection fraction 35%.  Now on full dose Entresto, Jardiance 10 mg, Toprol 2 mg daily, lactone 12.5 mg daily  -Patient reports dyspnea with moderate exertion; however that has improved  since starting GDMT.  He denies any clear chest heaviness but continues to have significant palpitations with fluctuations in his heart rate (as noted on a blood pressure cuff and his fitness tracker).  Also notices frequency; and occasional burning with the initiation of urination.  No fever/chills.  No orthopnea/PND.  Does not appear to have any lower extremity edema.  -Notably event monitor performed August 2023 showed a 14% burden of premature atrial contractions but no sustained tachyarrhythmias.    Since addition of Aldactone and uptitration of metoprolol he has not noted any significant changes.    Plan  -We will increase the metoprolol succinate to 75 mg daily, repeat echocardiogram to assess response to the GDMT  -If persistently low ejection fraction is noted we will consider a left heart catheterization for an ischemic evaluation  -RTC 3 months    Richy Stokes MD  Advanced Heart Failure/Transplant Cardiology  Cardio-Oncology  [facility]

## 2023-12-07 ENCOUNTER — OFFICE VISIT (OUTPATIENT)
Dept: CARDIOLOGY | Facility: CLINIC | Age: 53
End: 2023-12-07
Payer: COMMERCIAL

## 2023-12-07 ENCOUNTER — APPOINTMENT (OUTPATIENT)
Dept: ORTHOPEDIC SURGERY | Facility: CLINIC | Age: 53
End: 2023-12-07
Payer: COMMERCIAL

## 2023-12-07 VITALS
DIASTOLIC BLOOD PRESSURE: 88 MMHG | SYSTOLIC BLOOD PRESSURE: 128 MMHG | BODY MASS INDEX: 30.06 KG/M2 | HEIGHT: 70 IN | HEART RATE: 115 BPM | WEIGHT: 210 LBS | OXYGEN SATURATION: 97 %

## 2023-12-07 DIAGNOSIS — I50.22 CHRONIC SYSTOLIC HEART FAILURE (MULTI): ICD-10-CM

## 2023-12-07 PROCEDURE — 3079F DIAST BP 80-89 MM HG: CPT | Performed by: STUDENT IN AN ORGANIZED HEALTH CARE EDUCATION/TRAINING PROGRAM

## 2023-12-07 PROCEDURE — 99214 OFFICE O/P EST MOD 30 MIN: CPT | Performed by: STUDENT IN AN ORGANIZED HEALTH CARE EDUCATION/TRAINING PROGRAM

## 2023-12-07 PROCEDURE — 1036F TOBACCO NON-USER: CPT | Performed by: STUDENT IN AN ORGANIZED HEALTH CARE EDUCATION/TRAINING PROGRAM

## 2023-12-07 PROCEDURE — 3051F HG A1C>EQUAL 7.0%<8.0%: CPT | Performed by: STUDENT IN AN ORGANIZED HEALTH CARE EDUCATION/TRAINING PROGRAM

## 2023-12-07 PROCEDURE — 3074F SYST BP LT 130 MM HG: CPT | Performed by: STUDENT IN AN ORGANIZED HEALTH CARE EDUCATION/TRAINING PROGRAM

## 2023-12-07 RX ORDER — VIBEGRON 75 MG/1
1 TABLET, FILM COATED ORAL DAILY
COMMUNITY
End: 2023-12-19 | Stop reason: ALTCHOICE

## 2023-12-07 RX ORDER — METOPROLOL SUCCINATE 25 MG/1
75 TABLET, EXTENDED RELEASE ORAL DAILY
Qty: 270 TABLET | Refills: 3 | Status: SHIPPED | OUTPATIENT
Start: 2023-12-07 | End: 2024-05-02 | Stop reason: SDUPTHER

## 2023-12-07 RX ORDER — METOPROLOL SUCCINATE 25 MG/1
75 TABLET, EXTENDED RELEASE ORAL DAILY
Qty: 270 TABLET | Refills: 3 | Status: SHIPPED | OUTPATIENT
Start: 2023-12-07 | End: 2023-12-07 | Stop reason: SDUPTHER

## 2023-12-08 ENCOUNTER — APPOINTMENT (OUTPATIENT)
Dept: ORTHOPEDIC SURGERY | Facility: CLINIC | Age: 53
End: 2023-12-08
Payer: COMMERCIAL

## 2023-12-10 DIAGNOSIS — M17.12 UNILATERAL PRIMARY OSTEOARTHRITIS, LEFT KNEE: Primary | ICD-10-CM

## 2023-12-19 ENCOUNTER — OFFICE VISIT (OUTPATIENT)
Dept: PAIN MEDICINE | Facility: CLINIC | Age: 53
End: 2023-12-19
Payer: COMMERCIAL

## 2023-12-19 VITALS
DIASTOLIC BLOOD PRESSURE: 88 MMHG | RESPIRATION RATE: 16 BRPM | BODY MASS INDEX: 30.71 KG/M2 | WEIGHT: 214 LBS | HEART RATE: 118 BPM | SYSTOLIC BLOOD PRESSURE: 129 MMHG

## 2023-12-19 DIAGNOSIS — M54.12 CERVICAL RADICULITIS: ICD-10-CM

## 2023-12-19 DIAGNOSIS — M47.812 SPONDYLOSIS OF CERVICAL JOINT WITHOUT MYELOPATHY: ICD-10-CM

## 2023-12-19 DIAGNOSIS — M12.562 TRAUMATIC ARTHROPATHY OF LEFT KNEE: Primary | ICD-10-CM

## 2023-12-19 DIAGNOSIS — M17.12 OSTEOARTHRITIS OF LEFT KNEE, UNSPECIFIED OSTEOARTHRITIS TYPE: ICD-10-CM

## 2023-12-19 LAB
AMPHETAMINES UR QL SCN: ABNORMAL
BARBITURATES UR QL SCN: ABNORMAL
BENZODIAZ UR QL SCN: ABNORMAL
BZE UR QL SCN: ABNORMAL
CANNABINOIDS UR QL SCN: ABNORMAL
FENTANYL+NORFENTANYL UR QL SCN: ABNORMAL
OPIATES UR QL SCN: ABNORMAL
OXYCODONE+OXYMORPHONE UR QL SCN: ABNORMAL
PCP UR QL SCN: ABNORMAL

## 2023-12-19 PROCEDURE — 99213 OFFICE O/P EST LOW 20 MIN: CPT | Performed by: PHYSICIAN ASSISTANT

## 2023-12-19 PROCEDURE — 80365 DRUG SCREENING OXYCODONE: CPT | Mod: SAMLAB | Performed by: PHYSICIAN ASSISTANT

## 2023-12-19 PROCEDURE — 80307 DRUG TEST PRSMV CHEM ANLYZR: CPT | Mod: SAMLAB | Performed by: PHYSICIAN ASSISTANT

## 2023-12-19 PROCEDURE — G0463 HOSPITAL OUTPT CLINIC VISIT: HCPCS | Performed by: PHYSICIAN ASSISTANT

## 2023-12-19 RX ORDER — DICLOFENAC SODIUM 10 MG/G
4 GEL TOPICAL 4 TIMES DAILY PRN
Qty: 100 G | Refills: 1 | Status: SHIPPED | OUTPATIENT
Start: 2023-12-19 | End: 2024-03-19 | Stop reason: SDUPTHER

## 2023-12-19 RX ORDER — OXYCODONE AND ACETAMINOPHEN 5; 325 MG/1; MG/1
1-2 TABLET ORAL DAILY PRN
Qty: 60 TABLET | Refills: 0 | Status: SHIPPED | OUTPATIENT
Start: 2023-12-19 | End: 2024-01-18 | Stop reason: SDUPTHER

## 2023-12-19 ASSESSMENT — ENCOUNTER SYMPTOMS
NEUROLOGICAL NEGATIVE: 1
ENDOCRINE NEGATIVE: 1
HEMATOLOGIC/LYMPHATIC NEGATIVE: 1
CONSTITUTIONAL NEGATIVE: 1
GASTROINTESTINAL NEGATIVE: 1
MUSCULOSKELETAL NEGATIVE: 1
EYES NEGATIVE: 1
ALLERGIC/IMMUNOLOGIC NEGATIVE: 1
CARDIOVASCULAR NEGATIVE: 1
RESPIRATORY NEGATIVE: 1
PSYCHIATRIC NEGATIVE: 1

## 2023-12-19 ASSESSMENT — COLUMBIA-SUICIDE SEVERITY RATING SCALE - C-SSRS
1. IN THE PAST MONTH, HAVE YOU WISHED YOU WERE DEAD OR WISHED YOU COULD GO TO SLEEP AND NOT WAKE UP?: NO
6. HAVE YOU EVER DONE ANYTHING, STARTED TO DO ANYTHING, OR PREPARED TO DO ANYTHING TO END YOUR LIFE?: NO
2. HAVE YOU ACTUALLY HAD ANY THOUGHTS OF KILLING YOURSELF?: NO

## 2023-12-19 ASSESSMENT — PAIN SCALES - GENERAL: PAINLEVEL: 4

## 2023-12-19 NOTE — PROGRESS NOTES
Subjective   Patient ID: Sd Andino is a 53 y.o. male who presents for Pain (FUV L knee pain 4/10 today. Standing increases pain. Medication helps lower the pain. Refill for oxycodone needed. BMI education provided; Depression, smoking screen negative. ZONIA = 36/100. ).  Patient is a 53-year-old male.  He has a past medical history significant for left knee arthritis.  He states that he is getting ready to have a left knee replacement in January.  He is having some cardiac issues so he is not sure of the exact date as he is undergoing workup and evaluation for this but he is hopeful that his surgery is going to proceed as planned.  He has a [facility] diagnosis of left knee traumatic arthropathy-M12.562.  He uses Percocet 5/325 twice daily as needed pain.  He is hopeful to maybe come off of this after his surgery.  He is eager to pursue this.        Review of Systems   Constitutional: Negative.    HENT: Negative.     Eyes: Negative.    Respiratory: Negative.     Cardiovascular: Negative.    Gastrointestinal: Negative.    Endocrine: Negative.    Genitourinary: Negative.    Musculoskeletal: Negative.    Skin: Negative.    Allergic/Immunologic: Negative.    Neurological: Negative.    Hematological: Negative.    Psychiatric/Behavioral: Negative.         Objective   Physical Exam  Vitals and nursing note reviewed.   Constitutional:       Appearance: Normal appearance. He is obese.   HENT:      Head: Normocephalic and atraumatic.      Right Ear: External ear normal.      Left Ear: External ear normal.      Nose: Nose normal.      Mouth/Throat:      Mouth: Mucous membranes are moist.      Pharynx: Oropharynx is clear.   Eyes:      Conjunctiva/sclera: Conjunctivae normal.      Pupils: Pupils are equal, round, and reactive to light.   Cardiovascular:      Rate and Rhythm: Normal rate and regular rhythm.      Pulses: Normal pulses.   Pulmonary:      Effort: Pulmonary effort is normal.   Musculoskeletal:         General: Normal  range of motion.      Cervical back: Normal range of motion.   Skin:     General: Skin is warm and dry.   Neurological:      General: No focal deficit present.      Mental Status: He is alert and oriented to person, place, and time. Mental status is at baseline.   Psychiatric:         Mood and Affect: Mood normal.         Behavior: Behavior normal.         Thought Content: Thought content normal.         Judgment: Judgment normal.         Assessment/Plan   Diagnoses and all orders for this visit:  Traumatic arthropathy of left knee  Cervical radiculitis  -     oxyCODONE-acetaminophen (Percocet) 5-325 mg tablet; Take 1-2 tablets by mouth once daily as needed for severe pain (7 - 10).  -     diclofenac sodium (Voltaren) 1 % gel gel; Apply 1 Application topically 4 times a day as needed (as needed for pain).  Spondylosis of cervical joint without myelopathy  -     oxyCODONE-acetaminophen (Percocet) 5-325 mg tablet; Take 1-2 tablets by mouth once daily as needed for severe pain (7 - 10).  -     diclofenac sodium (Voltaren) 1 % gel gel; Apply 1 Application topically 4 times a day as needed (as needed for pain).  Osteoarthritis of left knee, unspecified osteoarthritis type  -     Drug Screen, Urine With Reflex to Confirmation       Patient is a 53-year-old male with a past medical history significant for traumatic arthropathy of the left knee-M12.562 Which is a [facility] claim.  Patient is using Percocet 5/325 twice daily as needed pain with improvement.  He is hopeful to have a knee replacement in January after undergoing evaluation for some cardiac issues.  At this time, he feels that things are overall going well with the medication and is hopeful to have the surgery.  [place] was reviewed and is appropriate.  We will obtain updated UDS today for compliance purposes.  Narcan was offered and declined at this time, patient will follow-up in 2 to 3 months for medication management purposes.  He will call the clinic sooner should  he require anything from our services.    Cherry Covington RN 12/19/23 3:04 PM

## 2023-12-21 ENCOUNTER — OFFICE VISIT (OUTPATIENT)
Dept: ORTHOPEDIC SURGERY | Facility: CLINIC | Age: 53
End: 2023-12-21
Payer: COMMERCIAL

## 2023-12-21 ENCOUNTER — ANCILLARY PROCEDURE (OUTPATIENT)
Dept: RADIOLOGY | Facility: CLINIC | Age: 53
End: 2023-12-21
Payer: COMMERCIAL

## 2023-12-21 DIAGNOSIS — M17.12 UNILATERAL PRIMARY OSTEOARTHRITIS, LEFT KNEE: ICD-10-CM

## 2023-12-21 DIAGNOSIS — M17.32 POST-TRAUMATIC OSTEOARTHRITIS OF LEFT KNEE: Primary | ICD-10-CM

## 2023-12-21 PROCEDURE — 77073 BONE LENGTH STUDIES: CPT

## 2023-12-21 PROCEDURE — 77073 BONE LENGTH STUDIES: CPT | Performed by: RADIOLOGY

## 2023-12-21 PROCEDURE — 99214 OFFICE O/P EST MOD 30 MIN: CPT | Performed by: ORTHOPAEDIC SURGERY

## 2023-12-21 PROCEDURE — 73562 X-RAY EXAM OF KNEE 3: CPT | Mod: LT

## 2023-12-21 PROCEDURE — 73562 X-RAY EXAM OF KNEE 3: CPT | Mod: LEFT SIDE | Performed by: RADIOLOGY

## 2023-12-21 PROCEDURE — G0463 HOSPITAL OUTPT CLINIC VISIT: HCPCS | Performed by: ORTHOPAEDIC SURGERY

## 2023-12-21 ASSESSMENT — PAIN - FUNCTIONAL ASSESSMENT: PAIN_FUNCTIONAL_ASSESSMENT: NO/DENIES PAIN

## 2023-12-21 NOTE — PROGRESS NOTES
Patient is a 53-year-old gentleman who presents today for discussion of upcoming left total knee arthroplasty.  He has a history of previous traumatic injury.  He is now failed a greater than 3-month trial of reasonable conservative treatment, and cortisone injections, anti-inflammatories and oxycodone.  He has difficulty walking sort of distance going downstairs.  He does have some cardiac issues which she needs to clear out prior to surgery.  He rates his pain at times as 8 out of 10.    Left knee:  AAOx3, NAD, walks with a moderate antalgic gait  Varus allignment  Range of motion lacks 10 degrees of full extension and flexes to 110 degrees  Stable to varus/valgus/anterior/posterior stress through out the range of motion  Slight laxity with varus stress  Diffuse medial  joint line tenderness to palpation  Moderate effusion  SILT in a traci/saph/per/tib distribution  5/5 knee extension/df/pf/ehl  ½ dorsalis pedis and posterior tibial pulse  no popliteal lymphadenopathy  no other overlying lesions  mood: euthymic  Respirations non labored    Plain films were reviewed by myself in clinic today.  They have advanced osteoarthritis of their knee with significant joint space narrowing, bone on bone changes, underlying sclerosis and tricompartmental osteophytic change.  Evidence of his previous open reduction internal fixation.    Patient is now failed a greater than 3-month trial of reasonable conservative treatment and wishes proceed with surgery which is indicated at this time.  Discussed the risk benefits alternatives to surgery.  All of their questions were answered.    Procedure: left total knee  Location: [facility]  ICD10: M17.12  CPT: 26435  Stay: outpatient  Equipment: Depuy Wormhole Primary Velys Jorden    I talked with the patient at length about risks, limitations, benefits and alternatives to total knee replacement today. I reviewed concerns about implant wear, loosening, breakage, infection and infection prophylaxis,  DVT, PE, death and other medical and anesthetic complications of surgery. We talked about the potential for persistent pain following surgery since there are many possible causes for knee and leg pain. The patient was advised that knee replacement will only relieve pain that is coming from the knee. We talked about limited range of motion following knee replacement and the importance of physical therapy and their motivation. We talked about improvements in pain management post-operatively and our accelerated rehab program. We talked about wound healing issues and neurovascular complications of surgery. I reviewed functional and activity restrictions in detail. We discussed the possible need for a homologous blood transfusion. We discussed the fact that many of our patients are able to go home in 1 day or less depending on their health, mobility, pre-op preparation, individual home situation and personal preference.  The patient has identified their personal goals of their joint replacement surgery and recovery and we have discussed them. These are documented in our [employer] patient engagement platform. In addition, we have discussed the advantages and disadvantages of various implant and fixation options, as well as various surgical approaches.  The basic concepts of the joint replacement procedure has been reviewed with the patient and the patient has been provided the opportunity to see an actual implant either in the office or in our pre-op education class.  All of the patients questions were answered. The patient can call my office to schedule surgery and the pre-op teaching class. I told the patient that they should contact their primary care physician to discuss fitness for surgery.    This note was created using voice recognition software and was not corrected for typographical or grammatical errors.

## 2023-12-22 ENCOUNTER — HOSPITAL ENCOUNTER (OUTPATIENT)
Dept: CARDIOLOGY | Facility: HOSPITAL | Age: 53
Discharge: HOME | End: 2023-12-22
Payer: COMMERCIAL

## 2023-12-22 VITALS
OXYGEN SATURATION: 97 % | RESPIRATION RATE: 18 BRPM | HEART RATE: 79 BPM | SYSTOLIC BLOOD PRESSURE: 136 MMHG | DIASTOLIC BLOOD PRESSURE: 90 MMHG

## 2023-12-22 DIAGNOSIS — I50.22 CHRONIC SYSTOLIC HEART FAILURE (MULTI): ICD-10-CM

## 2023-12-22 DIAGNOSIS — R07.9 CHEST PAIN, UNSPECIFIED: ICD-10-CM

## 2023-12-22 LAB
6MAM UR CFM-MCNC: <25 NG/ML
AORTIC VALVE MEAN GRADIENT: 4
AORTIC VALVE PEAK VELOCITY: 1.17
AV PEAK GRADIENT: 5.5
AVA (PEAK VEL): 2.35
AVA (VTI): 2.54
CODEINE UR CFM-MCNC: <50 NG/ML
EJECTION FRACTION APICAL 4 CHAMBER: 41
EJECTION FRACTION: 35
HYDROCODONE CTO UR CFM-MCNC: <25 NG/ML
HYDROMORPHONE UR CFM-MCNC: <25 NG/ML
LEFT ATRIUM VOLUME AREA LENGTH INDEX BSA: 18.7
LEFT VENTRICLE INTERNAL DIMENSION DIASTOLE: 5.29 (ref 3.5–6)
LEFT VENTRICULAR OUTFLOW TRACT DIAMETER: 2
MITRAL VALVE E/A RATIO: 1.37
MORPHINE UR CFM-MCNC: <50 NG/ML
NORHYDROCODONE UR CFM-MCNC: <25 NG/ML
NOROXYCODONE UR CFM-MCNC: 204 NG/ML
OXYCODONE UR CFM-MCNC: 62 NG/ML
OXYMORPHONE UR CFM-MCNC: 120 NG/ML
RIGHT VENTRICLE FREE WALL PEAK S': 12.3

## 2023-12-22 PROCEDURE — 93306 TTE W/DOPPLER COMPLETE: CPT | Performed by: STUDENT IN AN ORGANIZED HEALTH CARE EDUCATION/TRAINING PROGRAM

## 2023-12-22 PROCEDURE — 2500000004 HC RX 250 GENERAL PHARMACY W/ HCPCS (ALT 636 FOR OP/ED): Performed by: STUDENT IN AN ORGANIZED HEALTH CARE EDUCATION/TRAINING PROGRAM

## 2023-12-22 PROCEDURE — 93306 TTE W/DOPPLER COMPLETE: CPT

## 2023-12-22 RX ADMIN — PERFLUTREN 2 ML OF DILUTION: 6.52 INJECTION, SUSPENSION INTRAVENOUS at 12:21

## 2024-01-09 ENCOUNTER — TELEPHONE (OUTPATIENT)
Dept: CARDIOLOGY | Facility: CLINIC | Age: 54
End: 2024-01-09
Payer: COMMERCIAL

## 2024-01-09 NOTE — TELEPHONE ENCOUNTER
----- Message from Richy Stokes MD sent at 1/9/2024 12:53 PM EST -----  Please let patient know that for his persistently low ejection fraction as seen on his most recent echocardiogram he should undergo a left heart catheterization to identify if he might have blocked arteries; stenting of which might help improve his heart function.  I do notice that he might have upcoming orthopedic surgery; the left heart catheterization is not urgent and may be performed after any planned procedures.  Let me know if he has questions.

## 2024-01-10 ENCOUNTER — HOSPITAL ENCOUNTER (OUTPATIENT)
Facility: HOSPITAL | Age: 54
Setting detail: OUTPATIENT SURGERY
End: 2024-01-10
Attending: STUDENT IN AN ORGANIZED HEALTH CARE EDUCATION/TRAINING PROGRAM | Admitting: STUDENT IN AN ORGANIZED HEALTH CARE EDUCATION/TRAINING PROGRAM
Payer: COMMERCIAL

## 2024-01-10 DIAGNOSIS — R93.1 DECREASED CARDIAC EJECTION FRACTION: ICD-10-CM

## 2024-01-10 NOTE — TELEPHONE ENCOUNTER
----- Message from Jana Donaldson CMA sent at 1/10/2024 12:03 PM EST -----    ----- Message -----  From: Jana Donaldson CMA  Sent: 1/10/2024  12:03 PM EST  To: Do Nicki Doyle Clinical Support Staff    LMTCB. Pts LHC is scheduled on 1/18/2024     . Arrive at  11am        . Pt to be NPO after midnight.  Hold Metformin the day before the day of and 2 days after procedure. Hold spironolactone am of procedure.Can take all other am meds am of procedure. Will need labs.pt will need labs. FU scheduled for 2/2/2024 @330    ----- Message -----  From: Keisha Haro MA  Sent: 1/9/2024   1:40 PM EST  To: Richy Stokes MD; #    Pt notified and would like to proceed with heart cath. Would like to proceed with this prior to orthopedic surgery.   ----- Message -----  From: Richy Stokes MD  Sent: 1/9/2024  12:53 PM EST  To: Do Nicki Doyle Clinical Support Staff    Please let patient know that for his persistently low ejection fraction as seen on his most recent echocardiogram he should undergo a left heart catheterization to identify if he might have blocked arteries; stenting of which might help improve his heart function.  I do notice that he might have upcoming orthopedic surgery; the left heart catheterization is not urgent and may be performed after any planned procedures.  Let me know if he has questions.

## 2024-01-10 NOTE — TELEPHONE ENCOUNTER
Left message at [facility] to begin pre-cert process for upcoming C procedure. Pre-certification completed for primary insurance [employer] [place] and non applicable.

## 2024-01-10 NOTE — TELEPHONE ENCOUNTER
Pt called office and notified of instructions for upcoming LHC procedure and FUV on 2/2 at 3:30pm. Pt voiced understanding.

## 2024-01-10 NOTE — TELEPHONE ENCOUNTER
Spoke with Cris at [facility] and the claim on file is not affiliated with any cardiac diagnoses. Therefore a pre-certification is not required.

## 2024-01-15 ENCOUNTER — HOSPITAL ENCOUNTER (OUTPATIENT)
Facility: HOSPITAL | Age: 54
Setting detail: OBSERVATION
LOS: 1 days | Discharge: HOME | DRG: 322 | End: 2024-01-17
Attending: INTERNAL MEDICINE | Admitting: INTERNAL MEDICINE
Payer: COMMERCIAL

## 2024-01-15 DIAGNOSIS — R93.1 DECREASED CARDIAC EJECTION FRACTION: ICD-10-CM

## 2024-01-15 DIAGNOSIS — R07.9 CHEST PAIN: Primary | ICD-10-CM

## 2024-01-15 LAB — GLUCOSE BLD MANUAL STRIP-MCNC: 176 MG/DL (ref 74–99)

## 2024-01-15 PROCEDURE — 82947 ASSAY GLUCOSE BLOOD QUANT: CPT | Mod: 59

## 2024-01-15 PROCEDURE — 1100000001 HC PRIVATE ROOM DAILY

## 2024-01-15 PROCEDURE — 99222 1ST HOSP IP/OBS MODERATE 55: CPT | Performed by: INTERNAL MEDICINE

## 2024-01-15 RX ORDER — DEXTROSE 50 % IN WATER (D50W) INTRAVENOUS SYRINGE
25
Status: DISCONTINUED | OUTPATIENT
Start: 2024-01-15 | End: 2024-01-17 | Stop reason: HOSPADM

## 2024-01-15 RX ORDER — DEXTROSE MONOHYDRATE 100 MG/ML
0.3 INJECTION, SOLUTION INTRAVENOUS ONCE AS NEEDED
Status: DISCONTINUED | OUTPATIENT
Start: 2024-01-15 | End: 2024-01-17 | Stop reason: HOSPADM

## 2024-01-15 RX ORDER — IPRATROPIUM BROMIDE AND ALBUTEROL SULFATE 2.5; .5 MG/3ML; MG/3ML
3 SOLUTION RESPIRATORY (INHALATION) 4 TIMES DAILY PRN
Status: DISCONTINUED | OUTPATIENT
Start: 2024-01-15 | End: 2024-01-17 | Stop reason: HOSPADM

## 2024-01-15 SDOH — SOCIAL STABILITY: SOCIAL INSECURITY: HAVE YOU HAD THOUGHTS OF HARMING ANYONE ELSE?: NO

## 2024-01-15 SDOH — SOCIAL STABILITY: SOCIAL INSECURITY: DO YOU FEEL UNSAFE GOING BACK TO THE PLACE WHERE YOU ARE LIVING?: NO

## 2024-01-15 SDOH — SOCIAL STABILITY: SOCIAL INSECURITY: ARE YOU OR HAVE YOU BEEN THREATENED OR ABUSED PHYSICALLY, EMOTIONALLY, OR SEXUALLY BY ANYONE?: NO

## 2024-01-15 SDOH — SOCIAL STABILITY: SOCIAL INSECURITY: ARE THERE ANY APPARENT SIGNS OF INJURIES/BEHAVIORS THAT COULD BE RELATED TO ABUSE/NEGLECT?: NO

## 2024-01-15 SDOH — HEALTH STABILITY: MENTAL HEALTH: EXPERIENCED ANY OF THE FOLLOWING LIFE EVENTS: OTHER (COMMENT)

## 2024-01-15 SDOH — SOCIAL STABILITY: SOCIAL INSECURITY: WERE YOU ABLE TO COMPLETE ALL THE BEHAVIORAL HEALTH SCREENINGS?: YES

## 2024-01-15 SDOH — SOCIAL STABILITY: SOCIAL INSECURITY: ABUSE: ADULT

## 2024-01-15 SDOH — SOCIAL STABILITY: SOCIAL INSECURITY: DO YOU FEEL ANYONE HAS EXPLOITED OR TAKEN ADVANTAGE OF YOU FINANCIALLY OR OF YOUR PERSONAL PROPERTY?: NO

## 2024-01-15 SDOH — SOCIAL STABILITY: SOCIAL INSECURITY: POSSIBLE ABUSE REPORTED TO:: OTHER (COMMENT)

## 2024-01-15 SDOH — SOCIAL STABILITY: SOCIAL INSECURITY: HAS ANYONE EVER THREATENED TO HURT YOUR FAMILY OR YOUR PETS?: YES

## 2024-01-15 SDOH — SOCIAL STABILITY: SOCIAL INSECURITY: DOES ANYONE TRY TO KEEP YOU FROM HAVING/CONTACTING OTHER FRIENDS OR DOING THINGS OUTSIDE YOUR HOME?: NO

## 2024-01-15 ASSESSMENT — COGNITIVE AND FUNCTIONAL STATUS - GENERAL
MOBILITY SCORE: 24
DAILY ACTIVITIY SCORE: 24
PATIENT BASELINE BEDBOUND: NO

## 2024-01-15 ASSESSMENT — PATIENT HEALTH QUESTIONNAIRE - PHQ9
SUM OF ALL RESPONSES TO PHQ9 QUESTIONS 1 & 2: 0
2. FEELING DOWN, DEPRESSED OR HOPELESS: NOT AT ALL
1. LITTLE INTEREST OR PLEASURE IN DOING THINGS: NOT AT ALL

## 2024-01-15 ASSESSMENT — LIFESTYLE VARIABLES
AUDIT-C TOTAL SCORE: 1
HOW OFTEN DO YOU HAVE 6 OR MORE DRINKS ON ONE OCCASION: NEVER
SKIP TO QUESTIONS 9-10: 1
PRESCIPTION_ABUSE_PAST_12_MONTHS: NO
HOW OFTEN DO YOU HAVE A DRINK CONTAINING ALCOHOL: MONTHLY OR LESS
HOW MANY STANDARD DRINKS CONTAINING ALCOHOL DO YOU HAVE ON A TYPICAL DAY: 1 OR 2
SUBSTANCE_ABUSE_PAST_12_MONTHS: NO
AUDIT-C TOTAL SCORE: 1

## 2024-01-15 ASSESSMENT — COLUMBIA-SUICIDE SEVERITY RATING SCALE - C-SSRS
2. HAVE YOU ACTUALLY HAD ANY THOUGHTS OF KILLING YOURSELF?: NO
6. HAVE YOU EVER DONE ANYTHING, STARTED TO DO ANYTHING, OR PREPARED TO DO ANYTHING TO END YOUR LIFE?: NO
1. IN THE PAST MONTH, HAVE YOU WISHED YOU WERE DEAD OR WISHED YOU COULD GO TO SLEEP AND NOT WAKE UP?: NO

## 2024-01-15 ASSESSMENT — ACTIVITIES OF DAILY LIVING (ADL)
FEEDING YOURSELF: INDEPENDENT
LACK_OF_TRANSPORTATION: NO
HEARING - LEFT EAR: FUNCTIONAL
TOILETING: INDEPENDENT
DRESSING YOURSELF: INDEPENDENT
ADEQUATE_TO_COMPLETE_ADL: YES
BATHING: INDEPENDENT
WALKS IN HOME: INDEPENDENT
GROOMING: INDEPENDENT
PATIENT'S MEMORY ADEQUATE TO SAFELY COMPLETE DAILY ACTIVITIES?: YES
ASSISTIVE_DEVICE: OTHER (COMMENT)
HEARING - RIGHT EAR: FUNCTIONAL
JUDGMENT_ADEQUATE_SAFELY_COMPLETE_DAILY_ACTIVITIES: YES

## 2024-01-15 NOTE — Clinical Note
Angioplasty of the distal left anterior descending lesion. Inflation 1: Pressure = 18 sera; Duration = 18 sec. Inflation 2: Pressure = 8 sera; Duration = 10 sec.

## 2024-01-15 NOTE — Clinical Note
Vessel: LAD (distal). Stent inserted. Inflation 1: Pressure = 12 sera; Duration = 25 sec. Inflation 2: Pressure = 12 sera; Duration = 12 sec.

## 2024-01-15 NOTE — Clinical Note
Angioplasty of the distal left anterior descending lesion. Inflation 1: Pressure = 10 sera; Duration = 15 sec.

## 2024-01-16 ENCOUNTER — APPOINTMENT (OUTPATIENT)
Dept: CARDIOLOGY | Facility: HOSPITAL | Age: 54
DRG: 322 | End: 2024-01-16
Payer: COMMERCIAL

## 2024-01-16 ENCOUNTER — APPOINTMENT (OUTPATIENT)
Dept: PREADMISSION TESTING | Facility: HOSPITAL | Age: 54
End: 2024-01-16
Payer: COMMERCIAL

## 2024-01-16 ENCOUNTER — DOCUMENTATION (OUTPATIENT)
Dept: CARDIOLOGY | Facility: HOSPITAL | Age: 54
End: 2024-01-16

## 2024-01-16 DIAGNOSIS — I25.10 CORONARY ARTERY DISEASE INVOLVING NATIVE CORONARY ARTERY OF NATIVE HEART WITHOUT ANGINA PECTORIS: Primary | ICD-10-CM

## 2024-01-16 LAB
GLUCOSE BLD MANUAL STRIP-MCNC: 101 MG/DL (ref 74–99)
GLUCOSE BLD MANUAL STRIP-MCNC: 109 MG/DL (ref 74–99)
GLUCOSE BLD MANUAL STRIP-MCNC: 118 MG/DL (ref 74–99)
GLUCOSE BLD MANUAL STRIP-MCNC: 147 MG/DL (ref 74–99)

## 2024-01-16 PROCEDURE — 99152 MOD SED SAME PHYS/QHP 5/>YRS: CPT | Performed by: STUDENT IN AN ORGANIZED HEALTH CARE EDUCATION/TRAINING PROGRAM

## 2024-01-16 PROCEDURE — C1769 GUIDE WIRE: HCPCS | Performed by: STUDENT IN AN ORGANIZED HEALTH CARE EDUCATION/TRAINING PROGRAM

## 2024-01-16 PROCEDURE — 93010 ELECTROCARDIOGRAM REPORT: CPT | Performed by: STUDENT IN AN ORGANIZED HEALTH CARE EDUCATION/TRAINING PROGRAM

## 2024-01-16 PROCEDURE — C1894 INTRO/SHEATH, NON-LASER: HCPCS | Performed by: STUDENT IN AN ORGANIZED HEALTH CARE EDUCATION/TRAINING PROGRAM

## 2024-01-16 PROCEDURE — C1887 CATHETER, GUIDING: HCPCS | Performed by: STUDENT IN AN ORGANIZED HEALTH CARE EDUCATION/TRAINING PROGRAM

## 2024-01-16 PROCEDURE — 93458 L HRT ARTERY/VENTRICLE ANGIO: CPT | Mod: 59 | Performed by: STUDENT IN AN ORGANIZED HEALTH CARE EDUCATION/TRAINING PROGRAM

## 2024-01-16 PROCEDURE — 7100000009 HC PHASE TWO TIME - INITIAL BASE CHARGE: Performed by: STUDENT IN AN ORGANIZED HEALTH CARE EDUCATION/TRAINING PROGRAM

## 2024-01-16 PROCEDURE — 99223 1ST HOSP IP/OBS HIGH 75: CPT | Performed by: STUDENT IN AN ORGANIZED HEALTH CARE EDUCATION/TRAINING PROGRAM

## 2024-01-16 PROCEDURE — C9600 PERC DRUG-EL COR STENT SING: HCPCS | Performed by: STUDENT IN AN ORGANIZED HEALTH CARE EDUCATION/TRAINING PROGRAM

## 2024-01-16 PROCEDURE — 99153 MOD SED SAME PHYS/QHP EA: CPT | Performed by: STUDENT IN AN ORGANIZED HEALTH CARE EDUCATION/TRAINING PROGRAM

## 2024-01-16 PROCEDURE — 93005 ELECTROCARDIOGRAM TRACING: CPT

## 2024-01-16 PROCEDURE — 2500000001 HC RX 250 WO HCPCS SELF ADMINISTERED DRUGS (ALT 637 FOR MEDICARE OP): Performed by: STUDENT IN AN ORGANIZED HEALTH CARE EDUCATION/TRAINING PROGRAM

## 2024-01-16 PROCEDURE — G0378 HOSPITAL OBSERVATION PER HR: HCPCS

## 2024-01-16 PROCEDURE — 99232 SBSQ HOSP IP/OBS MODERATE 35: CPT

## 2024-01-16 PROCEDURE — 2500000004 HC RX 250 GENERAL PHARMACY W/ HCPCS (ALT 636 FOR OP/ED): Performed by: INTERNAL MEDICINE

## 2024-01-16 PROCEDURE — 2500000001 HC RX 250 WO HCPCS SELF ADMINISTERED DRUGS (ALT 637 FOR MEDICARE OP): Performed by: INTERNAL MEDICINE

## 2024-01-16 PROCEDURE — 2500000004 HC RX 250 GENERAL PHARMACY W/ HCPCS (ALT 636 FOR OP/ED): Performed by: STUDENT IN AN ORGANIZED HEALTH CARE EDUCATION/TRAINING PROGRAM

## 2024-01-16 PROCEDURE — 94664 DEMO&/EVAL PT USE INHALER: CPT

## 2024-01-16 PROCEDURE — 2500000001 HC RX 250 WO HCPCS SELF ADMINISTERED DRUGS (ALT 637 FOR MEDICARE OP)

## 2024-01-16 PROCEDURE — 92928 PRQ TCAT PLMT NTRAC ST 1 LES: CPT | Performed by: STUDENT IN AN ORGANIZED HEALTH CARE EDUCATION/TRAINING PROGRAM

## 2024-01-16 PROCEDURE — 93458 L HRT ARTERY/VENTRICLE ANGIO: CPT | Performed by: STUDENT IN AN ORGANIZED HEALTH CARE EDUCATION/TRAINING PROGRAM

## 2024-01-16 PROCEDURE — 7100000010 HC PHASE TWO TIME - EACH INCREMENTAL 1 MINUTE: Performed by: STUDENT IN AN ORGANIZED HEALTH CARE EDUCATION/TRAINING PROGRAM

## 2024-01-16 PROCEDURE — 82947 ASSAY GLUCOSE BLOOD QUANT: CPT

## 2024-01-16 PROCEDURE — 1200000002 HC GENERAL ROOM WITH TELEMETRY DAILY

## 2024-01-16 PROCEDURE — 94640 AIRWAY INHALATION TREATMENT: CPT

## 2024-01-16 PROCEDURE — 82947 ASSAY GLUCOSE BLOOD QUANT: CPT | Mod: 59

## 2024-01-16 PROCEDURE — 2780000003 HC OR 278 NO HCPCS: Performed by: STUDENT IN AN ORGANIZED HEALTH CARE EDUCATION/TRAINING PROGRAM

## 2024-01-16 PROCEDURE — 2550000001 HC RX 255 CONTRASTS: Performed by: STUDENT IN AN ORGANIZED HEALTH CARE EDUCATION/TRAINING PROGRAM

## 2024-01-16 PROCEDURE — 2500000002 HC RX 250 W HCPCS SELF ADMINISTERED DRUGS (ALT 637 FOR MEDICARE OP, ALT 636 FOR OP/ED)

## 2024-01-16 PROCEDURE — 2720000007 HC OR 272 NO HCPCS: Performed by: STUDENT IN AN ORGANIZED HEALTH CARE EDUCATION/TRAINING PROGRAM

## 2024-01-16 PROCEDURE — 9420000001 HC RT PATIENT EDUCATION 5 MIN

## 2024-01-16 PROCEDURE — 2500000005 HC RX 250 GENERAL PHARMACY W/O HCPCS: Performed by: STUDENT IN AN ORGANIZED HEALTH CARE EDUCATION/TRAINING PROGRAM

## 2024-01-16 PROCEDURE — C1874 STENT, COATED/COV W/DEL SYS: HCPCS | Performed by: STUDENT IN AN ORGANIZED HEALTH CARE EDUCATION/TRAINING PROGRAM

## 2024-01-16 PROCEDURE — C1725 CATH, TRANSLUMIN NON-LASER: HCPCS | Performed by: STUDENT IN AN ORGANIZED HEALTH CARE EDUCATION/TRAINING PROGRAM

## 2024-01-16 DEVICE — STENT ONYXNG20018UX ONYX 2.00X18RX
Type: IMPLANTABLE DEVICE | Site: HEART | Status: FUNCTIONAL
Brand: ONYX FRONTIER™

## 2024-01-16 RX ORDER — ATORVASTATIN CALCIUM 40 MG/1
40 TABLET, FILM COATED ORAL NIGHTLY
Status: DISCONTINUED | OUTPATIENT
Start: 2024-01-16 | End: 2024-01-17 | Stop reason: HOSPADM

## 2024-01-16 RX ORDER — FENTANYL CITRATE 50 UG/ML
INJECTION, SOLUTION INTRAMUSCULAR; INTRAVENOUS AS NEEDED
Status: DISCONTINUED | OUTPATIENT
Start: 2024-01-16 | End: 2024-01-16 | Stop reason: HOSPADM

## 2024-01-16 RX ORDER — ACETAMINOPHEN 160 MG/5ML
650 SOLUTION ORAL EVERY 4 HOURS PRN
Status: DISCONTINUED | OUTPATIENT
Start: 2024-01-16 | End: 2024-01-17 | Stop reason: HOSPADM

## 2024-01-16 RX ORDER — SPIRONOLACTONE 25 MG/1
12.5 TABLET ORAL DAILY
Status: DISCONTINUED | OUTPATIENT
Start: 2024-01-16 | End: 2024-01-17 | Stop reason: HOSPADM

## 2024-01-16 RX ORDER — LIDOCAINE HYDROCHLORIDE 20 MG/ML
INJECTION, SOLUTION INFILTRATION; PERINEURAL AS NEEDED
Status: DISCONTINUED | OUTPATIENT
Start: 2024-01-16 | End: 2024-01-16 | Stop reason: HOSPADM

## 2024-01-16 RX ORDER — IODIXANOL 270 MG/ML
INJECTION, SOLUTION INTRAVASCULAR AS NEEDED
Status: DISCONTINUED | OUTPATIENT
Start: 2024-01-16 | End: 2024-01-16 | Stop reason: HOSPADM

## 2024-01-16 RX ORDER — ONDANSETRON HYDROCHLORIDE 2 MG/ML
4 INJECTION, SOLUTION INTRAVENOUS EVERY 8 HOURS PRN
Status: DISCONTINUED | OUTPATIENT
Start: 2024-01-16 | End: 2024-01-17 | Stop reason: HOSPADM

## 2024-01-16 RX ORDER — ACETAMINOPHEN 325 MG/1
650 TABLET ORAL EVERY 4 HOURS PRN
Status: DISCONTINUED | OUTPATIENT
Start: 2024-01-16 | End: 2024-01-17 | Stop reason: HOSPADM

## 2024-01-16 RX ORDER — ASPIRIN 325 MG
TABLET ORAL AS NEEDED
Status: DISCONTINUED | OUTPATIENT
Start: 2024-01-16 | End: 2024-01-16 | Stop reason: HOSPADM

## 2024-01-16 RX ORDER — ZOLPIDEM TARTRATE 5 MG/1
10 TABLET ORAL NIGHTLY PRN
Status: DISCONTINUED | OUTPATIENT
Start: 2024-01-16 | End: 2024-01-17 | Stop reason: HOSPADM

## 2024-01-16 RX ORDER — ATORVASTATIN CALCIUM 40 MG/1
40 TABLET, FILM COATED ORAL NIGHTLY
Status: CANCELLED | OUTPATIENT
Start: 2024-01-16

## 2024-01-16 RX ORDER — VERAPAMIL HYDROCHLORIDE 2.5 MG/ML
INJECTION, SOLUTION INTRAVENOUS AS NEEDED
Status: DISCONTINUED | OUTPATIENT
Start: 2024-01-16 | End: 2024-01-16 | Stop reason: HOSPADM

## 2024-01-16 RX ORDER — ASPIRIN 325 MG
325 TABLET ORAL ONCE
Status: DISCONTINUED | OUTPATIENT
Start: 2024-01-16 | End: 2024-01-16

## 2024-01-16 RX ORDER — ACETAMINOPHEN 650 MG/1
650 SUPPOSITORY RECTAL EVERY 4 HOURS PRN
Status: DISCONTINUED | OUTPATIENT
Start: 2024-01-16 | End: 2024-01-17 | Stop reason: HOSPADM

## 2024-01-16 RX ORDER — PANTOPRAZOLE SODIUM 20 MG/1
20 TABLET, DELAYED RELEASE ORAL DAILY
Status: DISCONTINUED | OUTPATIENT
Start: 2024-01-16 | End: 2024-01-17 | Stop reason: HOSPADM

## 2024-01-16 RX ORDER — CLOPIDOGREL BISULFATE 75 MG/1
75 TABLET ORAL DAILY
Status: CANCELLED | OUTPATIENT
Start: 2024-01-17

## 2024-01-16 RX ORDER — ONDANSETRON 4 MG/1
4 TABLET, ORALLY DISINTEGRATING ORAL EVERY 8 HOURS PRN
Status: DISCONTINUED | OUTPATIENT
Start: 2024-01-16 | End: 2024-01-17 | Stop reason: HOSPADM

## 2024-01-16 RX ORDER — NITROGLYCERIN 40 MG/100ML
INJECTION INTRAVENOUS AS NEEDED
Status: DISCONTINUED | OUTPATIENT
Start: 2024-01-16 | End: 2024-01-16 | Stop reason: HOSPADM

## 2024-01-16 RX ORDER — MONTELUKAST SODIUM 10 MG/1
10 TABLET ORAL NIGHTLY
Status: DISCONTINUED | OUTPATIENT
Start: 2024-01-16 | End: 2024-01-17 | Stop reason: HOSPADM

## 2024-01-16 RX ORDER — HEPARIN SODIUM 1000 [USP'U]/ML
INJECTION, SOLUTION INTRAVENOUS; SUBCUTANEOUS AS NEEDED
Status: DISCONTINUED | OUTPATIENT
Start: 2024-01-16 | End: 2024-01-16 | Stop reason: HOSPADM

## 2024-01-16 RX ORDER — NAPROXEN SODIUM 220 MG/1
81 TABLET, FILM COATED ORAL DAILY
Status: CANCELLED | OUTPATIENT
Start: 2024-01-17

## 2024-01-16 RX ORDER — HEPARIN SODIUM 5000 [USP'U]/ML
INJECTION, SOLUTION INTRAVENOUS; SUBCUTANEOUS AS NEEDED
Status: DISCONTINUED | OUTPATIENT
Start: 2024-01-16 | End: 2024-01-16 | Stop reason: HOSPADM

## 2024-01-16 RX ORDER — MIDAZOLAM HYDROCHLORIDE 1 MG/ML
INJECTION INTRAMUSCULAR; INTRAVENOUS AS NEEDED
Status: DISCONTINUED | OUTPATIENT
Start: 2024-01-16 | End: 2024-01-16 | Stop reason: HOSPADM

## 2024-01-16 RX ORDER — NAPROXEN SODIUM 220 MG/1
81 TABLET, FILM COATED ORAL DAILY
Status: DISCONTINUED | OUTPATIENT
Start: 2024-01-16 | End: 2024-01-17 | Stop reason: HOSPADM

## 2024-01-16 RX ORDER — SODIUM CHLORIDE 9 MG/ML
100 INJECTION, SOLUTION INTRAVENOUS CONTINUOUS
Status: ACTIVE | OUTPATIENT
Start: 2024-01-16 | End: 2024-01-16

## 2024-01-16 RX ORDER — CLOPIDOGREL BISULFATE 300 MG/1
TABLET, FILM COATED ORAL AS NEEDED
Status: DISCONTINUED | OUTPATIENT
Start: 2024-01-16 | End: 2024-01-16 | Stop reason: HOSPADM

## 2024-01-16 RX ORDER — MAGNESIUM HYDROXIDE 2400 MG/10ML
10 SUSPENSION ORAL DAILY PRN
Status: DISCONTINUED | OUTPATIENT
Start: 2024-01-16 | End: 2024-01-17 | Stop reason: HOSPADM

## 2024-01-16 RX ORDER — ACETAMINOPHEN 325 MG/1
650 TABLET ORAL EVERY 6 HOURS PRN
Status: DISCONTINUED | OUTPATIENT
Start: 2024-01-16 | End: 2024-01-17 | Stop reason: HOSPADM

## 2024-01-16 RX ADMIN — MONTELUKAST 10 MG: 10 TABLET, FILM COATED ORAL at 20:24

## 2024-01-16 RX ADMIN — IPRATROPIUM BROMIDE AND ALBUTEROL SULFATE 3 ML: 2.5; .5 SOLUTION RESPIRATORY (INHALATION) at 18:08

## 2024-01-16 RX ADMIN — SACUBITRIL AND VALSARTAN 1 TABLET: 97; 103 TABLET, FILM COATED ORAL at 13:45

## 2024-01-16 RX ADMIN — SPIRONOLACTONE 12.5 MG: 25 TABLET ORAL at 13:45

## 2024-01-16 RX ADMIN — EMPAGLIFLOZIN 10 MG: 10 TABLET, FILM COATED ORAL at 13:45

## 2024-01-16 RX ADMIN — PANTOPRAZOLE SODIUM 20 MG: 20 TABLET, DELAYED RELEASE ORAL at 13:45

## 2024-01-16 RX ADMIN — SACUBITRIL AND VALSARTAN 1 TABLET: 97; 103 TABLET, FILM COATED ORAL at 20:24

## 2024-01-16 RX ADMIN — METOPROLOL SUCCINATE 75 MG: 50 TABLET, EXTENDED RELEASE ORAL at 13:45

## 2024-01-16 RX ADMIN — ATORVASTATIN CALCIUM 40 MG: 40 TABLET, FILM COATED ORAL at 20:24

## 2024-01-16 ASSESSMENT — PAIN SCALES - GENERAL
PAINLEVEL_OUTOF10: 0 - NO PAIN
PAINLEVEL_OUTOF10: 3
PAINLEVEL_OUTOF10: 0 - NO PAIN

## 2024-01-16 ASSESSMENT — PAIN - FUNCTIONAL ASSESSMENT

## 2024-01-16 ASSESSMENT — COGNITIVE AND FUNCTIONAL STATUS - GENERAL
MOBILITY SCORE: 24
DAILY ACTIVITIY SCORE: 24

## 2024-01-16 ASSESSMENT — PAIN DESCRIPTION - DESCRIPTORS: DESCRIPTORS: SORE

## 2024-01-16 ASSESSMENT — COLUMBIA-SUICIDE SEVERITY RATING SCALE - C-SSRS
2. HAVE YOU ACTUALLY HAD ANY THOUGHTS OF KILLING YOURSELF?: NO
1. IN THE PAST MONTH, HAVE YOU WISHED YOU WERE DEAD OR WISHED YOU COULD GO TO SLEEP AND NOT WAKE UP?: NO
6. HAVE YOU EVER DONE ANYTHING, STARTED TO DO ANYTHING, OR PREPARED TO DO ANYTHING TO END YOUR LIFE?: NO

## 2024-01-16 ASSESSMENT — ACTIVITIES OF DAILY LIVING (ADL): LACK_OF_TRANSPORTATION: NO

## 2024-01-16 NOTE — CONSULTS
"Inpatient consult to Cardiology  Consult performed by: Keanu Villarreal MD  Consult ordered by: KEVIN Cheema-CNP  Reason for consult: chest pain      History Of Present Illness:    Mr. Sd Andino is a 53 y.o. never smoker diabetic male being evaluated by the Cardiology team for chest pain. Patient with prior medial history significant for hypertension, HFrEF (LVEF 35%), hyperlipidemia, COVID-19 pneumonia, anxiety, cervical radiculitis, insomnia, tension headache, GERD, obstructive sleep apnea, right vocal cord paralysis, pharyngeal esophageal dysphagia, and diabetes mellitus . HE went to [facility] ED on 01/15/2024 complaining of chest pain. He was transferred to ED [facility] afterwards. He endorses that while at the grocery store, he suddenly experienced a left side chest clenching radiating to the left arm and associated with shortness of breath and lightheadedness, pressure in nature, intense, for 30 minutes, self limited. He also did have a near syncope but he did not lose conscience.  Patient denies palpitations, leg edema, fever, chills, orthopnea, paroxysmal nocturnal dyspnea or syncope.  EKG showing normal sinus rhythm with frequent PVCs and no sings of ACS. Troponin negative 0.05. Patient transferred for left heart catheterization.    Last Recorded Vitals:  Vitals:    01/16/24 0000 01/16/24 0400 01/16/24 0646   BP: 141/89 135/85 145/85   Pulse: 56 85 80   Resp: 20 20 20   Temp: 36.8 °C (98.2 °F) 36.8 °C (98.2 °F) 37 °C (98.6 °F)   TempSrc:   Temporal   SpO2: 95% 95% 99%   Weight:   93 kg (205 lb 0.4 oz)   Height:   1.778 m (5' 10\")       Last Labs:  CBC - 8/29/2023:  5:10 PM  10.1 16.4 360    47.6      CMP - 11/28/2023: 11:48 AM  8.8 6.9 20 --- 0.6   3.2 4.6 28 121      PTT - No results in last year.  1.1   12.7 _     Troponin I   Date/Time Value Ref Range Status   08/29/2023 07:07 PM 22 (H) 0 - 20 ng/L Final     Comment:     .  Less than 99th percentile of normal range " cutoff-  Female and children under 18 years old <14 ng/L; Male <21 ng/L: Negative  Repeat testing should be performed if clinically indicated.   .  Female and children under 18 years old 14-50 ng/L; Male 21-50 ng/L:  Consistent with possible cardiac damage and possible increased clinical   risk. Serial measurements may help to assess extent of myocardial damage.   .  >50 ng/L: Consistent with cardiac damage, increased clinical risk and  myocardial infarction. Serial measurements may help assess extent of   myocardial damage.   .   NOTE: Children less than 1 year old may have higher baseline troponin   levels and results should be interpreted in conjunction with the overall   clinical context.   .  NOTE: Troponin I testing is performed using a different   testing methodology at [facility] than at other   [facility]. Direct result comparisons should only   be made within the same method.     08/29/2023 05:10 PM 24 (H) 0 - 20 ng/L Final     Comment:     .  Less than 99th percentile of normal range cutoff-  Female and children under 18 years old <14 ng/L; Male <21 ng/L: Negative  Repeat testing should be performed if clinically indicated.   .  Female and children under 18 years old 14-50 ng/L; Male 21-50 ng/L:  Consistent with possible cardiac damage and possible increased clinical   risk. Serial measurements may help to assess extent of myocardial damage.   .  >50 ng/L: Consistent with cardiac damage, increased clinical risk and  myocardial infarction. Serial measurements may help assess extent of   myocardial damage.   .   NOTE: Children less than 1 year old may have higher baseline troponin   levels and results should be interpreted in conjunction with the overall   clinical context.   .  NOTE: Troponin I testing is performed using a different   testing methodology at [facility] than at other   [facility]. Direct result comparisons should only   be made within the same  method.     02/11/2023 10:52 PM 7 0 - 20 ng/L Final     Comment:     .  Less than 99th percentile of normal range cutoff-  Female and children under 18 years old <14 ng/L; Male <21 ng/L: Negative  Repeat testing should be performed if clinically indicated.   .  Female and children under 18 years old 14-50 ng/L; Male 21-50 ng/L:  Consistent with possible cardiac damage and possible increased clinical   risk. Serial measurements may help to assess extent of myocardial damage.   .  >50 ng/L: Consistent with cardiac damage, increased clinical risk and  myocardial infarction. Serial measurements may help assess extent of   myocardial damage.   .   NOTE: Children less than 1 year old may have higher baseline troponin   levels and results should be interpreted in conjunction with the overall   clinical context.   .  NOTE: Troponin I testing is performed using a different   testing methodology at [facility] than at other   [facility]. Direct result comparisons should only   be made within the same method.       BNP   Date/Time Value Ref Range Status   08/29/2023 05:10  (H) 0 - 99 pg/mL Final     Comment:     .  <100 pg/mL - Heart failure unlikely  100-299 pg/mL - Intermediate probability of acute heart  .               failure exacerbation. Correlate with clinical  .               context and patient history.    >=300 pg/mL - Heart Failure likely. Correlate with clinical  .               context and patient history.  BNP testing is performed using different testing   methodology at [facility] than at other   [facility]. Direct result comparisons should   only be made within the same method.       Hemoglobin A1C   Date/Time Value Ref Range Status   06/21/2023 03:04 PM 7.2 (A) % Final     Comment:          Diagnosis of Diabetes-Adults   Non-Diabetic: < or = 5.6%   Increased risk for developing diabetes: 5.7-6.4%   Diagnostic of diabetes: > or = 6.5%  .       Monitoring of  Diabetes                Age (y)     Therapeutic Goal (%)   Adults:          >18           <7.0   Pediatrics:    13-18           <7.5                   7-12           <8.0                   0- 6            7.5-8.5   American Diabetes Association. Diabetes Care 33(S1), Jan 2010.     04/12/2022 12:15 PM 7.2 (A) % Final     Comment:          Diagnosis of Diabetes-Adults   Non-Diabetic: < or = 5.6%   Increased risk for developing diabetes: 5.7-6.4%   Diagnostic of diabetes: > or = 6.5%  .       Monitoring of Diabetes                Age (y)     Therapeutic Goal (%)   Adults:          >18           <7.0   Pediatrics:    13-18           <7.5                   7-12           <8.0                   0- 6            7.5-8.5   American Diabetes Association. Diabetes Care 33(S1), Jan 2010.       VLDL   Date/Time Value Ref Range Status   04/12/2022 12:15 PM 27 0 - 40 mg/dL Final   06/23/2020 12:10 PM 25 0 - 40 mg/dL Final   11/25/2019 08:52 AM 32 0 - 40 mg/dL Final      Last I/O:  No intake/output data recorded.    Past Cardiology Tests (Last 3 Years):  EKG:  ECG 12 lead (Clinic Performed) 10/26/2023    Echo:  Transthoracic Echo (TTE) Complete 12/22/2023    Ejection Fractions:  EF   Date/Time Value Ref Range Status   12/22/2023 01:00 PM 35       Cath:  No results found for this or any previous visit from the past 1095 days.    Stress Test:  Nuclear Stress Test 09/08/2023    Cardiac Imaging:  No results found for this or any previous visit from the past 1095 days.      Past Medical History:  He has a past medical history of Ejection fraction < 50% (10/31/2023), Personal history of other diseases of the circulatory system (12/02/2019), and Type 2 diabetes mellitus without complications (CMS/[facility]).    Past Surgical History:  He has a past surgical history that includes Other surgical history (10/10/2019); Other surgical history (10/10/2019); Other surgical history (10/10/2019); Other surgical history (10/10/2019); Other surgical  history (10/10/2019); Other surgical history (10/10/2019); Other surgical history (10/14/2019); Other surgical history (11/13/2020); Other surgical history (02/28/2022); Other surgical history (06/07/2018); CT angio neck (2/9/2023); and MR angio neck wo IV contrast (2/9/2023).      Social History:  He reports that he has never smoked. He has never used smokeless tobacco. He reports current alcohol use. He reports that he does not use drugs.    Family History:  Family History   Problem Relation Name Age of Onset   • Hypertension Mother     • Hypertension Father     • Heart attack Other GRANDPARENT    • Stroke Other GRANDPARENT         Allergies:  Patient has no known allergies.    Inpatient Medications:  Scheduled medications   Medication Dose Route Frequency   • insulin lispro  0-15 Units subcutaneous With meals & nightly     PRN medications   Medication   • dextrose 10 % in water (D10W)   • dextrose   • glucagon   • insulin lispro   • ipratropium-albuteroL     Continuous Medications   Medication Dose Last Rate     Outpatient Medications:  Current Outpatient Medications   Medication Instructions   • albuterol 90 mcg/actuation inhaler 2 puffs, inhalation, Every 4 hours PRN   • albuterol 2.5 mg, nebulization, Every 4 hours PRN   • ascorbic acid (VITAMIN C) 500 mg, oral, Daily   • aspirin 81 mg, oral, Daily   • blood sugar diagnostic (Accu-Chek Alicia Plus test strp) strip Accu-Chek Alicia Plus In Vitro Strip   Refills: 0        Start : 15-Nov-2017   Active   • cholecalciferol (VITAMIN D-3) 50 mcg, oral   • diclofenac sodium (Voltaren) 1 % gel gel 1 Application, Topical, 4 times daily PRN   • empagliflozin (JARDIANCE) 10 mg, oral, Daily   • FreeStyle Romi sensor system (FreeStyle Romi 2 Sensor) kit 1 Device, subcutaneous   • glucosamine sulfate 500 mg, oral, 2 times daily   • LANCETS-BLOOD GLUCOSE STRIPS MISC Use to check blood sugar 4 times daily   • metFORMIN (Glucophage) 1,000 mg tablet 1 tablet, oral, Every 12  "hours   • metoprolol succinate XL (TOPROL-XL) 75 mg, oral, Daily   • montelukast (Singulair) 10 mg tablet 1 tablet, oral, Once as needed, nightly   • multivit-minerals/folic/ginkgo (DAILY MULTIPLE ORAL) 1 tablet, oral, Daily   • omega3/dha/epa/fish oil/vit D3 (OMEGA-3 PLUS VITAMIN D3 ORAL) 1 capsule, oral, 2 times daily   • OneTouch Verio Flex meter misc USE AS DIRECTED.   • oxyCODONE-acetaminophen (Percocet) 5-325 mg tablet 1-2 tablets, oral, Daily PRN   • Ozempic 1 mg, subcutaneous, Weekly   • pantoprazole (ProtoNix) 20 mg EC tablet 1 tablet, oral, Daily   • sacubitriL-valsartan (Entresto)  mg tablet One tablet twice daily   • spironolactone (ALDACTONE) 12.5 mg, oral, Daily   • syr,ndl,ins,safe 0.5mL,disp un (INSULIN SYRINGE-NEEDLE,DISPOS. MISC) USE ONE SYRINGE ONCE A WEEK   • syringe with needle 3 mL 25 gauge x 1\" syringe USE ONE SYRINGE ONCE A WEEK   • tadalafil 20 mg tablet 1 tablet, oral, As needed   • testosterone cypionate (DEPO-TESTOSTERONE) 200 mg, Once as needed, Every 10 days   • zinc gluconate 50 mg tablet 1 tablet, oral, Daily   • zolpidem (Ambien) 10 mg tablet 1 tablet, oral, Nightly PRN       Physical Exam:  General: alert, oriented and in no acute distress  HEENT: NC/AT; EOMI; PERRLA, external ear is normal  Neck: supple; trachea midline; no masses; no JVD  Chest: clear breath sounds bilaterally; no wheezing  Cardio: regular rhythm, S1S2 normal, no murmurs  Abdomen: Soft, non-tender, non-distension, no organomegaly  Extremities: no clubbing/cyanosis/edema  Neuro: Grossly intact     Psychiatric: Normal mood and affect     Assessment/Plan   Mr. Sd Andino is a 53 y.o. never smoker diabetic male being evaluated by the Cardiology team for chest pain. Patient with prior medial history significant for hypertension, HFrEF (LVEF 35%), hyperlipidemia, COVID-19 pneumonia, anxiety, cervical radiculitis, insomnia, tension headache, GERD, obstructive sleep apnea, right vocal cord paralysis, pharyngeal " esophageal dysphagia, and diabetes mellitus . HE went to [facility] ED on 01/15/2024 complaining of chest pain. He was transferred to ED [facility] afterwards. He endorses that while at the grocery store, he suddenly experienced a left side chest clenching radiating to the left arm and associated with shortness of breath and lightheadedness, pressure in nature, intense, for 30 minutes, self limited. He also did have a near syncope but he did not lose conscience.  Patient denies palpitations, leg edema, fever, chills, orthopnea, paroxysmal nocturnal dyspnea or syncope.  EKG showing normal sinus rhythm with frequent PVCs and no sings of ACS. Troponin negative 0.05. Patient transferred for left heart catheterization.    Assessment     # Chest pain  - Typical chest pain  - Troponin negative 0.05.   - EKG showing normal sinus rhythm with frequent PVCs and no sings of ACS.   - Patient supposed to undergo LHC on Thursday, transferred for earlier procedure.  - The natural history of atherosclerosis was discussed with the patient. The treatment options including GDMT, percutaneous intervention or surgical intervention were discussed with the patient. All the risks, benefits and alternative procedures were discussed. Complications of the procedure were also discussed, including but not limited to risk of bleeding, stroke, infarct, infection, urgent cardiac surgery or death. All questions were answered and the informed consent was obtained. After aforementioned testing and consultation, Left Heart Catheterization with potential Percutaneous Coronary Intervention would be a reasonable approach if the patient is candidate.  - Keep ASA.  - Start statin if no contra-indication.    # ACC/AHA stage C HFrEF  - Echocardiogram performed 9/8/2023 showed a moderately reduced ejection fraction 35%.  Now on full dose Entresto, Jardiance 10 mg, Toprol 2 mg daily, lactone 12.5 mg daily  - Patient reports dyspnea with moderate exertion;  however that has improved since starting GDMT.    - Notably event monitor performed August 2023 showed a 14% burden of premature atrial contractions but no sustained tachyarrhythmias.  - Keep home medication: Entressto, Metoprolol, Spironolactone, Jardiance    # Diabetes  - Keep home medication  - ISS    This critically ill patient continues to be at-risk for clinically significant deterioration / failure due to the above mentioned dysfunctional, unstable organ systems.  I have personally identified and managed all complex critical care issues to prevent aforementioned clinical deterioration.  Critical care time is spent at bedside and/or the immediate area and has included, but is not limited to, the review of diagnostic tests, labs, radiographs, serial assessments of hemodynamics, respiratory status, ventilatory management, and family updates.  Time spent in procedures and teaching are reported separately.    Critical care time: 65 minutes    Code Status:  No Order    Keanu Villarreal MD

## 2024-01-16 NOTE — CARE PLAN
Problem: Pain  Goal: My pain/discomfort is manageable  Outcome: Progressing     Problem: Psychosocial Needs  Goal: Collaborate with me, my family, and caregiver to identify my specific goals  Recent Flowsheet Documentation  Taken 1/15/2024 2148 by Carmen Puri RN  Cultural Requests During Hospitalization: none  Spiritual Requests During Hospitalization: none

## 2024-01-16 NOTE — POST-PROCEDURE NOTE
Physician Transition of Care Summary  Invasive Cardiovascular Lab    Procedure Date: 1/16/2024  Attending:    * Keanu Villarreal - Primary  Resident/Fellow/Other Assistant: Surgeon(s) and Role:    Indications:   Pre-op Diagnosis     * Decreased cardiac ejection fraction [R93.1]    Post-procedure diagnosis:   Post-op Diagnosis     * Decreased cardiac ejection fraction [R93.1]    Procedure(s):   Left Heart Cath  47555 - SC CATH PLMT L HRT & ARTS W/NJX & ANGIO IMG S&I    PCI VAELNTIN Stent- Coronary        Procedure Findings:   Single Vessel Coronary Artery Disease  S/P Successful PCI to distal LAD with one drug-eluting stent 2.0/18mm (post-dil 2.25mm)     Description of the Procedure:   Procedure: Percutaneous Coronary Intervention with Drug-eluting Stent implantation    R radial artery access with 6F sheath. Hemodynamic measurements. S/P Left heart catheterization (LHC) using JR 6F guide catheter showed significant Single Vessel coronary artery disease with severe obstruction in the distal LAD, severe 90% lesion. Heparinization 100ui/Kg. ACT > 250.     S/P successful percutaneous coronary intervention to distal LAD using drug-eluting stent (VALENTIN) 2.0/18mm (post-dil 2.25mm). Patient remained stable during the entire procedure. No complications. Hemostasis with TR Band.     Plan:    Patient loaded with ASA 325mg and Plavix 600mg. Should be discharged home keeping ASA 81mg daily and Plavix 75mg daily.   Compared to the pre-procedural EKG, post-procedural EKG with no new abnormalities and no signs of acute coronary syndrome.   Keep hydration 100mL/h for at least 3 hours, ideally 6 hours.   Patient may be discharged home after bed rest for 3 hours. Constant check for bleeding. Maintain compression band (CB) for 60 minutes past procedure. Remove 3mL of air from CB every 15 minutes until fully deflated. If recurrent bleeding occurs, re-inflate with enough air to fully restore hemostasis (2 to 3 mL, maximum of 18 mL).  Maintain CB at this same level for 30 minutes before attempting to re-deflate. Once fully deflated, carefully remove CB and place a sterile dressing over access site.  Observe for one hour, checking pulse every 15 minutes.  Instruct patient not to use or bend their wrist for four hours.  Keep GDMT for heart failure at this point.    Complications:   No    Stents/Implants:   Cardiovascular Implants       Stent    Stent, Sara Beatrice Reggie, 2.00 X 18rx - [id number] - Implanted        Inventory item: STENT, SARA FRONTIER REGGIE, 2.00 X 18RX Model/Cat number: [id number]    : [employer] Lot number: [id number]    Device identifier: [id number]        As of 1/16/2024       Status: Implanted                              Anticoagulation/Antiplatelet Plan:   No    Estimated Blood Loss:   5 mL    Anesthesia: Moderate Sedation Anesthesia Staff: No anesthesia staff entered.    Any Specimen(s) Removed:   No specimens collected during this procedure.    Disposition:   Floor    Electronically signed by: Keanu Villarreal MD, 1/16/2024 9:11 AM

## 2024-01-16 NOTE — PROGRESS NOTES
01/16/24 1640   Discharge Planning   Living Arrangements Spouse/significant other;Children   Support Systems Spouse/significant other;Children   Assistance Needed No   Type of Residence Private residence   Number of Stairs to Enter Residence 3   Number of Stairs Within Residence 0   Do you have animals or pets at home? Yes   Type of Animals or Pets 2 dogs   Who is requesting discharge planning? Other (Comment)   Home or Post Acute Services None   Patient expects to be discharged to: Home   Does the patient need discharge transport arranged? No   Financial Resource Strain   How hard is it for you to pay for the very basics like food, housing, medical care, and heating? Not very   Housing Stability   In the last 12 months, was there a time when you were not able to pay the mortgage or rent on time? N   In the last 12 months, how many places have you lived? 1   In the last 12 months, was there a time when you did not have a steady place to sleep or slept in a shelter (including now)? N   Transportation Needs   In the past 12 months, has lack of transportation kept you from medical appointments or from getting medications? no   In the past 12 months, has lack of transportation kept you from meetings, work, or from getting things needed for daily living? No   Patient Choice   Provider Choice list and CMS website (https://medicare.gov/care-compare#search) for post-acute Quality and Resource Measure Data were provided and reviewed with: Other (Comment)  (NA)   Patient / Family choosing to utilize agency / facility established prior to hospitalization No     Met with pt and wife at the bedside and verified address, phone number and emergency contact information. PCP is Jose Guadalupe Rosenberg and pharmacy of choice is [facility]. Pt is independent and lives at home with wife and feels safe. Denies any formal needs and plans to return home with wife.  CT to follow. Toshia Holt BSN/RN-TCC

## 2024-01-16 NOTE — H&P
Sd Andino is a 53 y.o. male on day 0 of admission presenting with Chest pain.    Subjective   52 yo male Who presented to the emergency room of another hospital for chest pain.  On presentation, vital signs came back grossly within normal limits.  EKG did not show any acute findings.  On presentation to the ER, he was found to have a blood pressure of 144/101.  Rest of his vital signs were grossly within normal limits.  Troponin negative.  He had a WBC count of 13,000.  Chest x-ray did not show any acute findings noted the EKG.  Patient was given IV fluids and Toradol.  And he was then admitted to our hospital for further investigation and management.  Upon encounter now, patient resting comfortably in his bed.  He said that he was in his usual state of health when and while at the grocery store, he suddenly experienced a left side chest clenching radiating to the left arm and associated with shortness of breath and lightheadedness.  He did have a near syncope but he did not pass out at all.  He also did experience nausea.  This all lasted for around 30 minutes.  So he went to the emergency room.  No leg edema.  No cough.  No flulike symptoms.  No fever or chills.         Objective   Last Recorded Vitals  There were no vitals taken for this visit.  Intake/Output last 3 Shifts:  No intake/output data recorded.    Physical Exam  Constitutional:       General: He is not in acute distress.     Appearance: He is ill-appearing.      Comments: Awake alert and oriented x3   HENT:      Mouth/Throat:      Pharynx: Oropharynx is clear.   Eyes:      Pupils: Pupils are equal, round, and reactive to light.   Cardiovascular:      Rate and Rhythm: Normal rate and regular rhythm.      Heart sounds: Normal heart sounds.   Pulmonary:      Effort: No respiratory distress.      Breath sounds: Normal breath sounds. No wheezing or rhonchi.   Abdominal:      General: Abdomen is flat. Bowel sounds are normal. There is no distension.       Palpations: Abdomen is soft.      Tenderness: There is no abdominal tenderness.   Musculoskeletal:         General: No swelling.   Skin:     General: Skin is warm.   Neurological:      General: No focal deficit present.   Psychiatric:         Mood and Affect: Mood normal.         Behavior: Behavior normal.         Thought Content: Thought content normal.         Judgment: Judgment normal.           Current Facility-Administered Medications:     dextrose 10 % in water (D10W) infusion, 0.3 g/kg/hr, intravenous, Once PRN, Cj Radford MD    dextrose 50 % injection 25 g, 25 g, intravenous, q15 min PRN, Cj Radford MD    glucagon (Glucagen) injection 1 mg, 1 mg, intramuscular, q15 min PRN, Cj Radford MD    [START ON 1/16/2024] insulin lispro (HumaLOG) injection 0-15 Units, 0-15 Units, subcutaneous, With meals & nightly, Cj Radford MD    insulin lispro (HumaLOG) injection 0-6 Units, 0-6 Units, subcutaneous, With snacks, Cj Radford MD    ipratropium-albuteroL (Duo-Neb) 0.5-2.5 mg/3 mL nebulizer solution 3 mL, 3 mL, nebulization, 4x daily PRN, Cj Radford MD     Relevant Results  Results for orders placed or performed during the hospital encounter of 01/15/24 (from the past 24 hour(s))   POCT GLUCOSE   Result Value Ref Range    POCT Glucose 176 (H) 74 - 99 mg/dL      XR chest 1 view    Result Date: 1/15/2024  EXAMINATION: XR CHEST PA/AP HISTORY: ORDERING SYSTEM PROVIDED HISTORY:  cp,  TECHNOLOGIST PROVIDED HISTORY:  Illness/Other Reason for exam: chest pain Cancer History: no Surgery, RadiationHistory: u Encounter Type: Initial Additional signs and symptoms: left sided chest pain that radiates to left arm that started 3 hours PTA while at rest.  Pt was recently diagnosed with CHF and has cardiac cath scheduled on Thursday.  +dizziness -sob ORDERING SYSTEM PROVIDED DIAGNOSIS CODES: COMPARISON: None. FINDINGS: AP portable upright view. MEDIASTINUM: Cardiac silhouette is within normal  limits. LUNGS AND PLEURA: Hypoventilation.  Lungs, pleural spaces and pulmonary vasculature are within normal limits. OSSEOUS STRUCTURES AND SOFT TISSUES: No acute osseous abnormality.  Prior anterior cervical fusion.    1. Low lung volumes.  Otherwise, no acute cardiopulmonary abnormality. [facility] Workstation ID:   [id number]       Assessment/Plan   Principal Problem:    Chest pain    53-year-old male with a past medical history of systolic CHF with an ejection fraction of 35%, hypertension, COVID-19 pneumonia, anxiety, cervical radiculitis, insomnia, tension headache, GERD, obstructive sleep apnea, right vocal cord paralysis, pharyngeal esophageal dysphagia, and diabetes mellitus who presented to the emergency room for chest pain.    I will admit the patient to the inpatient medical service with vital signs and telemetry monitoring.  Consult cardiology.  Plan is most likely to proceed with cardiac catheterization tomorrow morning  Resume home medications  SCDs for DVT prophylaxis  Full code         (This note was generated with voice recognition software and may contain errors including spelling, grammar, syntax and misrecognition of what was dictated, that are not fully corrected)     Cj Radford MD

## 2024-01-16 NOTE — PROGRESS NOTES
Mauricio Wilde is a 53 y.o. male on day 1 of admission presenting with Chest pain.      Subjective   Laying in the bed comfortably, wife at the bedside.  Reports no chest pain or shortness of breath.       Objective     Last Recorded Vitals  BP (!) 143/97   Pulse 94   Temp 37 °C (98.6 °F) (Temporal)   Resp 18   Wt 93 kg (205 lb 0.4 oz)   SpO2 96%   Intake/Output last 3 Shifts:    Intake/Output Summary (Last 24 hours) at 1/16/2024 1427  Last data filed at 1/16/2024 0841  Gross per 24 hour   Intake --   Output 5 ml   Net -5 ml       Admission Weight  Weight: 93 kg (205 lb 0.4 oz) (01/16/24 0646)    Daily Weight  01/16/24 : 93 kg (205 lb 0.4 oz)    Image Results  Electrocardiogram 12 Lead  Sinus tachycardia with Premature atrial complexes  Nonspecific T wave abnormality  Abnormal ECG  When compared with ECG of 16-JAN-2024 07:31, (unconfirmed)  No significant change was found  ECG 12 Lead  Sinus tachycardia with Premature atrial complexes with Aberrant conduction  Nonspecific T wave abnormality  Abnormal ECG  When compared with ECG of 29-AUG-2023 16:42,  Previous ECG has undetermined rhythm, needs review  Cardiac catheterization - coronary           [facility]     [address] [place] [address]  Phone [phone], Fax [phone]    Cardiovascular Catheterization Report    Patient Name:      MAURICIO WILDE       Performing Physician: [id number] Villarreal MD  Study Date:        1/16/2024            Verifying Physician:  David Villarreal MD  MRN/PID:           [id number]             Cardiologist:  Accession#:        [id number]         Ordering Provider:    [id number] GADIEL JOLLY  Date of Birth/Age: 1970 / 53 years Fellow:  Gender:            THERESA                     Fellow:  Encounter#:        [id number]       Study:            PCI - Percutaneous Coronary Intervention  Additional Study: Left Heart Cath       Indications:  MAURICIO WILDE is a 53 year old male who presents with hypertension, dyslipidemia, diabetes and a chest pain assessment of typical angina. Worsening angina.  Stress test performed: No. CTA performed: No. Juliette accessed: No. LVEF  Assessed: Yes. LVEF = 35%.  Cardiac arrest: No.  Cardiac surgical consult: No.  Cardiovascular Instability: No  Frailty status of patient entering lab: 5 = Mildly frail.       Procedure Description:  After infiltration with 2% Lidocaine, the right radial artery was cannulated with a modified Seldinger technique. Subsequently a 6 Indonesian sheath was placed in the right radial artery. Selective coronary catheterization was performed using a 5 Fr catheter(s) exchanged over a guide wire to cannulate the coronary arteries.  Multiple injections of contrast were made into the left and right coronary arteries with angiograms recorded in multiple projections. After completion of the procedure, the arterial sheath was pulled and a TR Band Radial Compression Device was utilized to obtain patent hemostasis.     Coronary Angiography:  The coronary circulation is right dominant.     Left Main Coronary Artery:  The left main coronary artery is a normal caliber vessel. The left main arises normally from the left coronary sinus of Valsalva and bifurcates into the LAD and circumflex coronary arteries. The left main coronary artery showed a normal vessel.     Left Anterior Descending Coronary Artery Distribution:  The left anterior descending coronary artery is a normal caliber vessel. The LAD arises normally from the left main coronary artery. The LAD demonstrated atherosclerotic disease. The distal left anterior descending coronary artery showed 90% stenosis. This lesion was irregular. The 1st diagonal branch is a normal caliber vessel. The 1st  diagonal branch showed a normal vessel. The 2nd diagonal branch is a normal caliber vessel. The 2nd diagonal branch demonstrated a normal vessel.     Circumflex Coronary Artery Distribution:  The circumflex coronary artery is a normal caliber vessel. The circumflex arises normally from the left main coronary artery and terminates in the AV groove. The circumflex revealed no significant disease or stenosis greater than 30%. The 1st obtuse marginal branch is a small caliber vessel. The 1st obtuse marginal branch showed a normal vessel. The 2nd obtuse marginal branch is a normal caliber vessel. The 2nd obtuse marginal branch demonstrated a normal vessel.     Right Coronary Artery Distribution:    The right coronary artery is a normal caliber vessel. The RCA arises normally from the right sinus of Valsalva. The RCA showed no significant disease or stenosis greater than 30%. The right posterolateral branch is a small caliber vessel. The right posterolateral branch showed a normal vessel. The right posterior descending artery is a normal caliber vessel. The right posterior descending artery showed a normal vessel.     Coronary Interventions:  Angiography reveals a 90% stenosis of the distal left anterior descending coronary artery. Pre-intervention TASHI flow was 3. Percutaneous coronary intervention was performed within the distal left anterior descending. The vessel was pre-dilated using a compliant balloon 2.0 mm x 15 mm at 10 FRANSISCO. Resolute [place] drug-eluting stent 2.0 mm x 18 mm was advanced to the lesion and implanted at 18 FRANSISCO. The stent was post dilated using a non-compliant balloon 2.25 mm x 15 mm at 18 FRANSISCO. The stenosis was successfully reduced from 90% to <10%. Post-intervention TASHI flow was 3.     Coronary Lesion Summary:  Vessel   Stenosis   Vessel Segment  LAD    90% stenosis     distal       Hemo Personnel:  +-----------------------------+---------+  Name                         Duty        +-----------------------------+---------+  Keanu Bee MD 1  +-----------------------------+---------+       Hemodynamic Pressures:     +----+-------------------+---------+------------+-------------+------+---------+  Site     Date Time       Phase    Systolic    Diastolic    ED  Mean mmHg                           Name       mmHg        mmHg      mmHg            +----+-------------------+---------+------------+-------------+------+---------+    AO  1/16/2024 7:38:40 AIR REST           6            0              1                       AM                                                   +----+-------------------+---------+------------+-------------+------+---------+    LV  1/16/2024 7:49:58 AIR REST         120            3    11                                AM                                                   +----+-------------------+---------+------------+-------------+------+---------+    LV  1/16/2024 7:50:05 AIR REST         118            2    10                                AM                                                   +----+-------------------+---------+------------+-------------+------+---------+   LVp  1/16/2024 7:50:13 AIR REST         118            4    10                                AM                                                   +----+-------------------+---------+------------+-------------+------+---------+   AOp  1/16/2024 7:50:20 AIR REST         120            2             58                       AM                                                   +----+-------------------+---------+------------+-------------+------+---------+    AO  1/16/2024 7:50:59 AIR REST         111           66             91                       AM                                                    +----+-------------------+---------+------------+-------------+------+---------+    AO  1/16/2024 8:06:22 AIR REST         118           73             95                       AM                                                   +----+-------------------+---------+------------+-------------+------+---------+    AO  1/16/2024 8:30:56 AIR REST         116           76             97                       AM                                                   +----+-------------------+---------+------------+-------------+------+---------+       Complications:  No in-lab complications observed.     Cardiac Cath Post Procedure Notes:  Post Procedure Diagnosis: Single vessel disease.  Blood Loss:               Estimated blood loss during the procedure was 0 mls.  Specimens Removed:        Number of specimen(s) removed: none.       Recommendations:  Maximize medical therapy.  Follow-up with cardiology clinic.  Anti-platelet therapy with Clopidogrel (Plavix) 75mg daily and Aspirin.    ____________________________________________________________________________________  CONCLUSIONS:   1. Right coronary artery system dominance.   2. Single vessel coronary artery disease.   3. Distal LAD 90%.   4. S/P Successful PCI to distal LAD with one drug-eluting stent 2.0/18mm (post-dil 2.25mm).    ICD 10 Codes:  Chest pain, unspecified-R07.9     CPT Codes:  Left Heart Cath (visualization of coronaries) and LV-77878; Stent w angioplasty Left Anterior Descending single major Artery branch (PCI)-99438.LD; Moderate Sedation Services initial 15 minutes patient >5 years-63382; Moderate Sedation Services 1st additional 15 minutes patient >5 years-72547     [id number] Keanu Villarreal MD  Performing Physician  Electronically signed by [id number] Keanu Villarreal MD on 1/16/2024 at  9:14:18 AM         ** Final **      Physical Exam  General: alert, oriented and in no acute distress  HEENT: NC/AT; EOMI;  PERRLA, external ear is normal  Neck: supple; trachea midline; no masses; no JVD  Chest: clear breath sounds bilaterally; no wheezing  Cardio: regular rhythm, S1S2 normal, no murmurs  Abdomen: Soft, non-tender, non-distension, no organomegaly  Extremities: no clubbing/cyanosis/edema  Neuro: Grossly intact     Psychiatric: Normal mood and affect     Relevant Results             Scheduled medications  aspirin, 81 mg, oral, Daily  atorvastatin, 40 mg, oral, Nightly  empagliflozin, 10 mg, oral, Daily  insulin lispro, 0-15 Units, subcutaneous, With meals & nightly  metoprolol succinate XL, 75 mg, oral, Daily  montelukast, 10 mg, oral, Nightly  pantoprazole, 20 mg, oral, Daily  sacubitriL-valsartan, 1 tablet, oral, BID  spironolactone, 12.5 mg, oral, Daily      Continuous medications     PRN medications  PRN medications: acetaminophen **OR** acetaminophen **OR** acetaminophen, acetaminophen, dextrose 10 % in water (D10W), dextrose, glucagon, insulin lispro, ipratropium-albuteroL, magnesium hydroxide, ondansetron ODT **OR** ondansetron, zolpidem  Results for orders placed or performed during the hospital encounter of 01/15/24 (from the past 24 hour(s))   POCT GLUCOSE   Result Value Ref Range    POCT Glucose 176 (H) 74 - 99 mg/dL   POCT GLUCOSE   Result Value Ref Range    POCT Glucose 101 (H) 74 - 99 mg/dL   ECG 12 Lead   Result Value Ref Range    Ventricular Rate 102 BPM    Atrial Rate 102 BPM    MD Interval 174 ms    QRS Duration 88 ms    QT Interval 346 ms    QTC Calculation(Bazett) 450 ms    P Axis 52 degrees    R Axis -5 degrees    T Axis 16 degrees    QRS Count 17 beats    Q Onset 212 ms    P Onset 125 ms    P Offset 182 ms    T Offset 385 ms    QTC Fredericia 412 ms   Electrocardiogram 12 Lead   Result Value Ref Range    Ventricular Rate 109 BPM    Atrial Rate 109 BPM    MD Interval 130 ms    QRS Duration 80 ms    QT Interval 326 ms    QTC Calculation(Bazett) 439 ms    P Axis 73 degrees    R Axis -12 degrees    T  Axis 18 degrees    QRS Count 18 beats    Q Onset 225 ms    P Onset 160 ms    P Offset 192 ms    T Offset 388 ms    QTC Fredericia 397 ms   POCT GLUCOSE   Result Value Ref Range    POCT Glucose 109 (H) 74 - 99 mg/dL      Assessment/Plan        Principal Problem:    Chest pain  Active Problems:    Decreased cardiac ejection fraction    Mr. Sd Andino is a 53 y.o. never smoker diabetic male being evaluated by the Cardiology team for chest pain. Patient with prior medial history significant for hypertension, HFrEF (LVEF 35%), hyperlipidemia, COVID-19 pneumonia, anxiety, cervical radiculitis, insomnia, tension headache, GERD, obstructive sleep apnea, right vocal cord paralysis, pharyngeal esophageal dysphagia, and diabetes mellitus . HE went to [facility] ED on 01/15/2024 complaining of chest pain. He was transferred to ED [facility] afterwards. He endorses that while at the grocery store, he suddenly experienced a left side chest clenching radiating to the left arm and associated with shortness of breath and lightheadedness, pressure in nature, intense, for 30 minutes, self limited. He also did have a near syncope but he did not lose conscience.  Patient denies palpitations, leg edema, fever, chills, orthopnea, paroxysmal nocturnal dyspnea or syncope.  EKG showing normal sinus rhythm with frequent PVCs and no sings of ACS. Troponin negative 0.05.  Patient admitted for chest pain evaluation and left heart cath.    # Chest pain  -Currently chest pain-free  -Troponin has been negative  -EKG showing normal sinus rhythm with PVCs  -Left heart cath today drug-eluting stent to distal LAD   -Started on atorvastatin today  -Lipid panel  -Will hydrate with IV fluids post cath  -BMP tomorrow morning    # CHF  -Echocardiogram performed 9/8/2023 showed a moderately reduced ejection fraction 35%.     -Continue home dose Entresto, Jardiance, Toprol, and Aldactone    # GERD  -On Protonix    # Diabetes mellitus  -Carb controlled  diet  -Humalog sliding scale    # Insomnia  -Continue home dose Ambien    SCDs for DVT prophylaxis    Full code     Discharge disposition: Anticipated discharge tomorrow morning, home with no needs       KEVIN Cheema-CNP

## 2024-01-16 NOTE — CONSULTS
I met with Sd Andino in his room in the cath lab bay 1 S/P PCI on 01/16/2024. He is alert and oriented and open to discussion. I reviewed contact information, allergies, current medications, and past medical history. We discussed what cardiac rehab entails, length of time, session length, education, and when we would be reaching out for Sd Andino to start. Patient reported he is non smoker and has never smoked.  Sd Andino is interested in coming to cardiac rehab. Cardiac rehab folder reviewed and provided along with our contact information. We will reach out next week to discuss further. All questions answered.

## 2024-01-16 NOTE — CARE PLAN
Problem: Psychosocial Needs  Goal: Collaborate with me, my family, and caregiver to identify my specific goals  Recent Flowsheet Documentation  Taken 1/15/2024 2148 by Carmen Puri RN  Cultural Requests During Hospitalization: none  Spiritual Requests During Hospitalization: none

## 2024-01-17 ENCOUNTER — APPOINTMENT (OUTPATIENT)
Dept: UROLOGY | Facility: CLINIC | Age: 54
End: 2024-01-17
Payer: COMMERCIAL

## 2024-01-17 VITALS
RESPIRATION RATE: 20 BRPM | OXYGEN SATURATION: 94 % | WEIGHT: 205.03 LBS | BODY MASS INDEX: 29.35 KG/M2 | DIASTOLIC BLOOD PRESSURE: 82 MMHG | HEIGHT: 70 IN | HEART RATE: 102 BPM | TEMPERATURE: 98.2 F | SYSTOLIC BLOOD PRESSURE: 133 MMHG

## 2024-01-17 LAB
ALBUMIN SERPL BCP-MCNC: 4.2 G/DL (ref 3.4–5)
ALP SERPL-CCNC: 104 U/L (ref 33–120)
ALT SERPL W P-5'-P-CCNC: 14 U/L (ref 10–52)
ANION GAP SERPL CALC-SCNC: 12 MMOL/L (ref 10–20)
AST SERPL W P-5'-P-CCNC: 14 U/L (ref 9–39)
BILIRUB SERPL-MCNC: 0.5 MG/DL (ref 0–1.2)
BUN SERPL-MCNC: 13 MG/DL (ref 6–23)
CALCIUM SERPL-MCNC: 8.4 MG/DL (ref 8.6–10.3)
CHLORIDE SERPL-SCNC: 104 MMOL/L (ref 98–107)
CHOLEST SERPL-MCNC: 124 MG/DL (ref 0–199)
CHOLESTEROL/HDL RATIO: 3.6
CO2 SERPL-SCNC: 26 MMOL/L (ref 21–32)
CREAT SERPL-MCNC: 1.05 MG/DL (ref 0.5–1.3)
EGFRCR SERPLBLD CKD-EPI 2021: 86 ML/MIN/1.73M*2
ERYTHROCYTE [DISTWIDTH] IN BLOOD BY AUTOMATED COUNT: 12.1 % (ref 11.5–14.5)
GLUCOSE BLD MANUAL STRIP-MCNC: 138 MG/DL (ref 74–99)
GLUCOSE BLD MANUAL STRIP-MCNC: 149 MG/DL (ref 74–99)
GLUCOSE SERPL-MCNC: 162 MG/DL (ref 74–99)
HCT VFR BLD AUTO: 49.3 % (ref 41–52)
HDLC SERPL-MCNC: 34 MG/DL
HGB BLD-MCNC: 16.3 G/DL (ref 13.5–17.5)
LDLC SERPL CALC-MCNC: 66 MG/DL
MCH RBC QN AUTO: 30 PG (ref 26–34)
MCHC RBC AUTO-ENTMCNC: 33.1 G/DL (ref 32–36)
MCV RBC AUTO: 91 FL (ref 80–100)
NON HDL CHOLESTEROL: 90 MG/DL (ref 0–149)
NRBC BLD-RTO: 0 /100 WBCS (ref 0–0)
PLATELET # BLD AUTO: 312 X10*3/UL (ref 150–450)
POTASSIUM SERPL-SCNC: 3.6 MMOL/L (ref 3.5–5.3)
PROT SERPL-MCNC: 6 G/DL (ref 6.4–8.2)
RBC # BLD AUTO: 5.44 X10*6/UL (ref 4.5–5.9)
SODIUM SERPL-SCNC: 138 MMOL/L (ref 136–145)
TRIGL SERPL-MCNC: 121 MG/DL (ref 0–149)
VLDL: 24 MG/DL (ref 0–40)
WBC # BLD AUTO: 8.7 X10*3/UL (ref 4.4–11.3)

## 2024-01-17 PROCEDURE — 85027 COMPLETE CBC AUTOMATED: CPT

## 2024-01-17 PROCEDURE — 80061 LIPID PANEL: CPT

## 2024-01-17 PROCEDURE — 99231 SBSQ HOSP IP/OBS SF/LOW 25: CPT

## 2024-01-17 PROCEDURE — 2500000001 HC RX 250 WO HCPCS SELF ADMINISTERED DRUGS (ALT 637 FOR MEDICARE OP)

## 2024-01-17 PROCEDURE — G0378 HOSPITAL OBSERVATION PER HR: HCPCS

## 2024-01-17 PROCEDURE — 99233 SBSQ HOSP IP/OBS HIGH 50: CPT

## 2024-01-17 PROCEDURE — 2500000001 HC RX 250 WO HCPCS SELF ADMINISTERED DRUGS (ALT 637 FOR MEDICARE OP): Performed by: INTERNAL MEDICINE

## 2024-01-17 PROCEDURE — 2500000004 HC RX 250 GENERAL PHARMACY W/ HCPCS (ALT 636 FOR OP/ED): Performed by: INTERNAL MEDICINE

## 2024-01-17 PROCEDURE — 36415 COLL VENOUS BLD VENIPUNCTURE: CPT

## 2024-01-17 PROCEDURE — 82947 ASSAY GLUCOSE BLOOD QUANT: CPT

## 2024-01-17 PROCEDURE — 84075 ASSAY ALKALINE PHOSPHATASE: CPT | Performed by: INTERNAL MEDICINE

## 2024-01-17 RX ORDER — CLOPIDOGREL BISULFATE 75 MG/1
75 TABLET ORAL DAILY
Qty: 30 TABLET | Refills: 1 | Status: SHIPPED | OUTPATIENT
Start: 2024-01-18 | End: 2024-02-13 | Stop reason: SDUPTHER

## 2024-01-17 RX ORDER — CLOPIDOGREL BISULFATE 75 MG/1
75 TABLET ORAL DAILY
Status: DISCONTINUED | OUTPATIENT
Start: 2024-01-17 | End: 2024-01-17 | Stop reason: HOSPADM

## 2024-01-17 RX ORDER — ATORVASTATIN CALCIUM 40 MG/1
40 TABLET, FILM COATED ORAL NIGHTLY
Qty: 30 TABLET | Refills: 1 | Status: SHIPPED | OUTPATIENT
Start: 2024-01-17 | End: 2024-02-13 | Stop reason: SDUPTHER

## 2024-01-17 RX ADMIN — SPIRONOLACTONE 12.5 MG: 25 TABLET ORAL at 09:14

## 2024-01-17 RX ADMIN — EMPAGLIFLOZIN 10 MG: 10 TABLET, FILM COATED ORAL at 09:14

## 2024-01-17 RX ADMIN — ACETAMINOPHEN 650 MG: 325 TABLET ORAL at 09:58

## 2024-01-17 RX ADMIN — ASPIRIN 81 MG CHEWABLE TABLET 81 MG: 81 TABLET CHEWABLE at 09:14

## 2024-01-17 RX ADMIN — CLOPIDOGREL 75 MG: 75 TABLET ORAL at 09:14

## 2024-01-17 RX ADMIN — METOPROLOL SUCCINATE 75 MG: 50 TABLET, EXTENDED RELEASE ORAL at 09:14

## 2024-01-17 RX ADMIN — PANTOPRAZOLE SODIUM 20 MG: 20 TABLET, DELAYED RELEASE ORAL at 09:14

## 2024-01-17 RX ADMIN — SACUBITRIL AND VALSARTAN 1 TABLET: 97; 103 TABLET, FILM COATED ORAL at 09:14

## 2024-01-17 RX ADMIN — ACETAMINOPHEN 650 MG: 325 TABLET ORAL at 05:48

## 2024-01-17 ASSESSMENT — COGNITIVE AND FUNCTIONAL STATUS - GENERAL
DAILY ACTIVITIY SCORE: 24
MOBILITY SCORE: 24

## 2024-01-17 ASSESSMENT — PAIN SCALES - GENERAL
PAINLEVEL_OUTOF10: 3
PAINLEVEL_OUTOF10: 0 - NO PAIN
PAINLEVEL_OUTOF10: 5 - MODERATE PAIN

## 2024-01-17 ASSESSMENT — PAIN - FUNCTIONAL ASSESSMENT
PAIN_FUNCTIONAL_ASSESSMENT: 0-10

## 2024-01-17 ASSESSMENT — PAIN DESCRIPTION - LOCATION: LOCATION: HAND

## 2024-01-17 ASSESSMENT — PAIN DESCRIPTION - ORIENTATION: ORIENTATION: RIGHT

## 2024-01-17 NOTE — DISCHARGE SUMMARY
Discharge Diagnosis  Chest pain    Issues Requiring Follow-Up  CHF and CAD    Discharge Meds     Your medication list        START taking these medications        Instructions Last Dose Given Next Dose Due   atorvastatin 40 mg tablet  Commonly known as: Lipitor      Take 1 tablet (40 mg) by mouth once daily at bedtime.       clopidogrel 75 mg tablet  Commonly known as: Plavix  Start taking on: January 18, 2024      Take 1 tablet (75 mg) by mouth once daily. Do not start before January 18, 2024.              CONTINUE taking these medications        Instructions Last Dose Given Next Dose Due   Accu-Chek Alicia Plus test strp strip  Generic drug: blood sugar diagnostic           albuterol 2.5 mg /3 mL (0.083 %) nebulizer solution           albuterol 90 mcg/actuation inhaler           ascorbic acid 500 mg tablet  Commonly known as: Vitamin C           aspirin 81 mg chewable tablet           cholecalciferol 50 mcg (2,000 unit) capsule  Commonly known as: Vitamin D-3           DAILY MULTIPLE ORAL           diclofenac sodium 1 % gel gel  Commonly known as: Voltaren      Apply 1 Application topically 4 times a day as needed (as needed for pain).       empagliflozin 10 mg  Commonly known as: Jardiance      Take 1 tablet (10 mg) by mouth once daily.       Entresto  mg tablet  Generic drug: sacubitriL-valsartan      One tablet twice daily       FreeStyle Romi 2 Sensor kit  Generic drug: FreeStyle Romi sensor system           glucosamine sulfate 500 mg tablet           INSULIN SYRINGE-NEEDLE,DISPOS. MISC           LANCETS-BLOOD GLUCOSE STRIPS [facility]           metFORMIN 1,000 mg tablet  Commonly known as: Glucophage           metoprolol succinate XL 25 mg 24 hr tablet  Commonly known as: Toprol-XL      Take 3 tablets (75 mg) by mouth once daily.       montelukast 10 mg tablet  Commonly known as: Singulair           OneTouch Verio Flex meter misc  Generic drug: blood-glucose meter           oxyCODONE-acetaminophen 5-325 mg  "tablet  Commonly known as: Percocet      Take 1-2 tablets by mouth once daily as needed for severe pain (7 - 10).       Ozempic 1 mg/dose (2 mg/1.5 mL) pen injector  Generic drug: semaglutide           pantoprazole 20 mg EC tablet  Commonly known as: ProtoNix           spironolactone 25 mg tablet  Commonly known as: Aldactone      Take 0.5 tablets (12.5 mg) by mouth once daily.       syringe with needle 3 mL 25 gauge x 1\" syringe           tadalafil 20 mg tablet  Commonly known as: Cialis           testosterone cypionate 200 mg/mL injection  Commonly known as: Depo-Testosterone           zinc gluconate 50 mg tablet           zolpidem 10 mg tablet  Commonly known as: Ambien                  ASK your doctor about these medications        Instructions Last Dose Given Next Dose Due   OMEGA-3 PLUS VITAMIN D3 ORAL                     Where to Get Your Medications        These medications were sent to [id number] - [place], [place] - [address]  [address] [facility] [address]      Phone: [phone]   atorvastatin 40 mg tablet  clopidogrel 75 mg tablet         Test Results Pending At Discharge  Pending Labs       No current pending labs.            Hospital Course   Mr. Sd Andino is a 53 y.o. never smoker diabetic male being evaluated by the Cardiology team for chest pain. Patient with prior medial history significant for hypertension, HFrEF (LVEF 35%), hyperlipidemia, COVID-19 pneumonia, anxiety, cervical radiculitis, insomnia, tension headache, GERD, obstructive sleep apnea, right vocal cord paralysis, pharyngeal esophageal dysphagia, and diabetes mellitus . HE went to [facility] ED on 01/15/2024 complaining of chest pain. He was transferred to ED [facility] afterwards. He endorses that while at the grocery store, he suddenly experienced a left side chest clenching radiating to the left arm and associated with shortness of breath and lightheadedness, pressure in nature, intense, for 30 " minutes, self limited. He also did have a near syncope but he did not lose conscience.  Patient denies palpitations, leg edema, fever, chills, orthopnea, paroxysmal nocturnal dyspnea or syncope.  EKG showing normal sinus rhythm with frequent PVCs and no sings of ACS. Troponin negative 0.05.  Patient admitted for chest pain evaluation and left heart cath.       Pertinent Physical Exam At Time of Discharge  Physical Exam  General: awake, alert and oriented. No acute distress.   Skin: Skin is warm, dry and intact without rashes or lesions.  HEENT: normocephalic, atraumatic; conjunctivae are clear without exudates or hemorrhage. Sclera is non-icteric. Eyelids are normal in appearance without swelling or lesions. Hearing intact. Nares are patent bilaterally. Moist mucous membranes.   Cardiovascular: heart rate and rhythm are normal. No murmurs, gallops, or rubs are auscultated. S1 and S2 are heard and are of normal intensity. No JVD, no carotid bruits  Respiratory: bilateral lung sounds clear to auscultations without rales, rhonchi, or wheezes. No accessory muscle use or stridor  Gastrointestinal: non-distended, non-tender  Genitourinary: exam deferred  Musculoskeletal: ROM intact, no deformities  Extremities: pulses palpable bilaterally; no swelling or erythema  Neurological: no focal deficits; gait steady  Psychiatric: appropriate mood and affect; good judgment and insight     Outpatient Follow-Up  Future Appointments   Date Time Provider Department [place]   2/2/2024  3:30 PM MARTINEZ Crook, [facility] [id number] [facility]   2/5/2024  3:00 PM Salas Linares MD [id number] [facility]   3/13/2024 10:45 AM Katie Bess PA-C [id number] [facility]   3/21/2024  9:30 AM Richy Stokes MD [id number] [facility]       Follow-up with PCP in 1 week  MARTINEZ Cheema

## 2024-01-17 NOTE — NURSING NOTE
Discharge Note: 1/17/2024 2249 Discharge instructions and pt responsibilities reviewed with pt and copy given. Chest pain, cardiac catheterization, education reviewed with pt and information sheets given. Pt verbalizes understanding of instructions received, verbalizes understanding of when to seek medical attention, denies any home going or personal care needs. Denies further questions or concerns. Reviewed follow up appts with pt and verbalizes understanding. Bandaid dry and intact to cath site. Declines wheelchair, ambulates to elevator accompanied by wife, personal belongings taken with pt, no distress noted, no complaints voiced.Amos HALE

## 2024-01-18 ENCOUNTER — TELEPHONE (OUTPATIENT)
Dept: PAIN MEDICINE | Facility: CLINIC | Age: 54
End: 2024-01-18
Payer: COMMERCIAL

## 2024-01-18 DIAGNOSIS — M47.812 SPONDYLOSIS OF CERVICAL JOINT WITHOUT MYELOPATHY: ICD-10-CM

## 2024-01-18 DIAGNOSIS — M54.12 CERVICAL RADICULITIS: ICD-10-CM

## 2024-01-18 RX ORDER — OXYCODONE AND ACETAMINOPHEN 5; 325 MG/1; MG/1
1-2 TABLET ORAL DAILY PRN
Qty: 60 TABLET | Refills: 0 | Status: SHIPPED | OUTPATIENT
Start: 2024-01-18 | End: 2024-02-14 | Stop reason: SDUPTHER

## 2024-01-19 NOTE — NURSING NOTE
Discharge Note: Follow up call: 1/19/2024 1652 Pt states is feeling great, denies any cardiac symptoms, denies issues with cath site, denies issues or needs, states is just feeling great, states appreciates the call, states was greatly taken care of during stay. Amos HALE

## 2024-02-01 LAB
ATRIAL RATE: 102 BPM
ATRIAL RATE: 109 BPM
P AXIS: 52 DEGREES
P AXIS: 73 DEGREES
P OFFSET: 182 MS
P OFFSET: 192 MS
P ONSET: 125 MS
P ONSET: 160 MS
PR INTERVAL: 130 MS
PR INTERVAL: 174 MS
Q ONSET: 212 MS
Q ONSET: 225 MS
QRS COUNT: 17 BEATS
QRS COUNT: 18 BEATS
QRS DURATION: 80 MS
QRS DURATION: 88 MS
QT INTERVAL: 326 MS
QT INTERVAL: 346 MS
QTC CALCULATION(BAZETT): 439 MS
QTC CALCULATION(BAZETT): 450 MS
QTC FREDERICIA: 397 MS
QTC FREDERICIA: 412 MS
R AXIS: -12 DEGREES
R AXIS: -5 DEGREES
T AXIS: 16 DEGREES
T AXIS: 18 DEGREES
T OFFSET: 385 MS
T OFFSET: 388 MS
VENTRICULAR RATE: 102 BPM
VENTRICULAR RATE: 109 BPM

## 2024-02-01 ASSESSMENT — ENCOUNTER SYMPTOMS
EYES NEGATIVE: 1
FEVER: 0
ALLERGIC/IMMUNOLOGIC NEGATIVE: 1
DIFFICULTY URINATING: 0
SHORTNESS OF BREATH: 0
CHILLS: 0
ENDOCRINE NEGATIVE: 1
NAUSEA: 0
COUGH: 0
PSYCHIATRIC NEGATIVE: 1

## 2024-02-01 NOTE — PROGRESS NOTES
[facility] CARDIOLOGY OFFICE VISIT      CHIEF COMPLAINT  Follow up [facility]    HISTORY OF PRESENT ILLNESS    Today, patient presents after having his LHC and reports feeling better, however, he does still occasionally get episodes of difficulty catching his breath. He reports compliance with his prescribed medications. He does monitor BP at home and reports systolic BP <140    Cardiovascular hx:    ACC/AHA Stage C HFrEF, ischemic (LVSF 35%)  -NYHA Class III  - Patient presented to [facility] ED on 1/15/24 in the setting of chest pain. Patient underwent a LHC with Dr. Villarreal on 1/16/24 showing 90% stenosis. Successful PCI to distal LAD with 1 VALENTIN  -DAPT x 1 year and then stop plavix and continue ASA indefinitely   -Compliant with high-intensity statin; no adverse effects reported  -GDMT includes Jardiance 10mg daily, Toprol XL 75mg daily, high-intensity Entresto, Spironolactone 12.5mg daily   -BP goal <130/80        Cardiac testing:  Echo (December, 2023)-EF 35%  2. Event monitor (August, 2023)-14% burden of premature atrial contractions but no sustained tachyarrhythmias      Past Medical History  Past Medical History:   Diagnosis Date    Ejection fraction < 50% 10/31/2023    Personal history of other diseases of the circulatory system 12/02/2019    History of essential hypertension    Type 2 diabetes mellitus without complications (CMS/[facility])     Type 2 diabetes mellitus without complication, without long-term current use of insulin       Social History  Social History     Tobacco Use    Smoking status: Never    Smokeless tobacco: Never   Substance Use Topics    Alcohol use: Yes     Comment: 10 beers yearly    Drug use: Never       Family History     Family History   Problem Relation Name Age of Onset    Hypertension Mother      Hypertension Father      Heart attack Other GRANDPARENT     Stroke Other GRANDPARENT         Allergies:  No Known Allergies     Outpatient Medications:  Current Outpatient Medications  "  Medication Instructions    albuterol 90 mcg/actuation inhaler 2 puffs, inhalation, Every 4 hours PRN    albuterol 2.5 mg, nebulization, Every 4 hours PRN    ascorbic acid (VITAMIN C) 500 mg, oral, Daily    aspirin 81 mg, oral, Daily    atorvastatin (LIPITOR) 40 mg, oral, Nightly    blood sugar diagnostic (Accu-Chek Alicia Plus test strp) strip Accu-Chek Alicia Plus In Vitro Strip   Refills: 0        Start : 15-Nov-2017   Active    cholecalciferol (VITAMIN D-3) 50 mcg, oral    clopidogrel (PLAVIX) 75 mg, oral, Daily    diclofenac sodium (Voltaren) 1 % gel gel 1 Application, Topical, 4 times daily PRN    empagliflozin (JARDIANCE) 10 mg, oral, Daily    FreeStyle Romi sensor system (FreeStyle Romi 2 Sensor) kit 1 Device, subcutaneous    glucosamine sulfate 500 mg, oral, 2 times daily    LANCETS-BLOOD GLUCOSE STRIPS MISC Use to check blood sugar 4 times daily    metFORMIN (Glucophage) 1,000 mg tablet 1 tablet, oral, Every 12 hours    metoprolol succinate XL (TOPROL-XL) 75 mg, oral, Daily    montelukast (Singulair) 10 mg tablet 1 tablet, oral, Once as needed, nightly    multivit-minerals/folic/ginkgo (DAILY MULTIPLE ORAL) 1 tablet, oral, Daily    omega3/dha/epa/fish oil/vit D3 (OMEGA-3 PLUS VITAMIN D3 ORAL) 1 capsule, oral, 2 times daily    OneTouch Verio Flex meter misc USE AS DIRECTED.    oxyCODONE-acetaminophen (Percocet) 5-325 mg tablet 1-2 tablets, oral, Daily PRN    Ozempic 1 mg, subcutaneous, Weekly    pantoprazole (ProtoNix) 20 mg EC tablet 1 tablet, oral, Daily    sacubitriL-valsartan (Entresto)  mg tablet One tablet twice daily    spironolactone (ALDACTONE) 12.5 mg, oral, Daily    syr,ndl,ins,safe 0.5mL,disp un (INSULIN SYRINGE-NEEDLE,DISPOS. MISC) USE ONE SYRINGE ONCE A WEEK    syringe with needle 3 mL 25 gauge x 1\" syringe USE ONE SYRINGE ONCE A WEEK    tadalafil 20 mg tablet 1 tablet, oral, As needed    testosterone cypionate (DEPO-TESTOSTERONE) 200 mg, Once as needed, Every 10 days    zinc gluconate " "50 mg tablet 1 tablet, oral, Daily    zolpidem (Ambien) 10 mg tablet 1 tablet, oral, Nightly PRN          Labs:   CMP:  Recent Labs     01/17/24  0522 11/28/23  1148 09/29/23  1150 09/15/23  1145 08/29/23  1710 02/12/23  0534 02/11/23  2252 02/10/23  0636    139 140 138 140   < > 134* 136   K 3.6 3.8 3.9 3.9 3.8   < > 4.2 4.3    106 105 102 104   < > 100 100   CO2 26 25 27 28 26   < > 24 22   ANIONGAP 12 12 12 12 14   < > 14 18   BUN 13 24* 18 17 15   < > 26* 26*   CREATININE 1.05 0.99 1.05 1.19 1.11   < > 1.11 1.02   EGFR 86 >90  --   --   --   --   --   --    MG  --   --   --   --   --   --  2.04 1.64    < > = values in this interval not displayed.     Recent Labs     01/17/24  0522 06/21/23  1504 02/11/23  2252 02/10/23  0636 02/09/23  1401   ALBUMIN 4.2 4.6 4.1 4.6 4.5   ALKPHOS 104 121* 79 88 112   ALT 14 28 25 21 22   AST 14 20 20 21 16   BILITOT 0.5 0.6 0.5 0.6 0.7     CBC:  Recent Labs     01/17/24  0522 08/29/23  1710 06/21/23  1504 02/12/23  0534 02/11/23  2252   WBC 8.7 10.1 9.3 10.8 15.4*   HGB 16.3 16.4 14.9 13.0* 14.6   HCT 49.3 47.6 42.8 39.4* 43.0    360 384 336 396   MCV 91 92 90 95 94     COAG:   Recent Labs     02/10/23  0636 10/11/21  1644 09/08/21  1614 09/07/21  2111   INR 1.1  --   --  1.3*   DDIMERVTE  --  798* <215 284     ABO: No results for input(s): \"ABO\" in the last 65977 hours.  HEME/ENDO:  Recent Labs     06/21/23  1504 02/11/23  2252 04/12/22  1215 09/19/21  0508 09/08/21  1059 09/07/21  2050 06/23/20  1210   FERRITIN  --   --   --   --  3,610* 2,046*  --    TSH  --  0.38* 2.83  --   --   --   --    HGBA1C 7.2*  --  7.2* 8.2*  --   --  8.1      CARDIAC:   Recent Labs     08/29/23  1907 08/29/23  1710 02/11/23  2252   TROPHS 22* 24* 7   BNP  --  279*  --      Recent Labs     01/17/24  0522 04/12/22  1215 09/14/21  0531 06/23/20  1210 11/25/19  0852   CHOL 124 163  --  152 161   LDLF  --  102*  --  90 93   HDL 34.0 34.0*  --  37.0* 36.0*   TRIG 121 136 894* 123 158* "     MICRO:   Recent Labs     09/16/21  0546 09/11/21  0428 09/08/21  1614 09/08/21  1059   CRP 1.14* 10.71*  --  30.85*   PROCAL  --   --  1.16*  --      No results found for the last 90 days.    Notable Studies: imaging personally reviewed   EKG:  Encounter Date: 01/15/24   Electrocardiogram 12 Lead   Result Value    Ventricular Rate 109    Atrial Rate 109    HI Interval 130    QRS Duration 80    QT Interval 326    QTC Calculation(Bazett) 439    P Axis 73    R Axis -12    T Axis 18    QRS Count 18    Q Onset 225    P Onset 160    P Offset 192    T Offset 388    QTC Fredericia 397    Narrative    Sinus tachycardia with Premature atrial complexes  Nonspecific T wave abnormality  Abnormal ECG  When compared with ECG of 16-JAN-2024 07:31, (unconfirmed)  No significant change was found  Confirmed by Keanu Bee ([id number]) on 2/1/2024 3:42:27 PM     Echocardiogram:   Echocardiogram     [place] [address]  Phone [phone], Fax [phone]    TRANSTHORACIC ECHOCARDIOGRAM REPORT      Patient Name:     MAURICIO ROB CHANI Montaño Physician:   [id number] Stokes MD  Study Date:       9/8/2023       Referring Physician: Michelle Stokes MD  MRN/PID:          [id number]       PCP:  Accession/Order#: [id number]      Department Location: [facility]  YOB: 1970      Fellow:  Gender:           M              Nurse:  Admit Date:                      Sonographer:         BRENDA Lawrence RVT  Admission Status: Outpatient     Additional Staff:  Height:           177.80 cm      CC Report to:  Weight:           95.26 kg       Study Type:          Echocardiogram  BSA:              2.13 m2  Blood Pressure: 136 /86 mmHg    Diagnosis/ICD: R06.02-Shortness of breath; R79.89-Elevated Troponin  Indication:    Dyspnea  Procedure/CPT: Echo Complete w Full Doppler-[id number]    Patient History:  Pertinent History: No previous echo.    Study Detail: The  following Echo studies were performed: M-Mode, 2D, Doppler and  color flow. Definity used as a contrast agent for endocardial  border definition. Total contrast used for this procedure was 1 mL  via IV push. A bubble study was not performed. The patient was  awake.      PHYSICIAN INTERPRETATION:  Left Ventricle: Left ventricular systolic function is moderately decreased, with an estimated ejection fraction of 35-40%. There is global hypokinesis of the left ventricle with minor regional variations. The left ventricular cavity size is normal. Left ventricular diastolic filling was indeterminate.  Left Atrium: The left atrium is normal in size.  Right Ventricle: The right ventricle is normal in size. There is normal right ventricular global systolic function.  Right Atrium: The right atrium is normal in size.  Aortic Valve: The aortic valve is probably trileaflet. There is no evidence of aortic valve regurgitation. The peak instantaneous gradient of the aortic valve is 5.0 mmHg. The mean gradient of the aortic valve is 3.0 mmHg.  Mitral Valve: The mitral valve is normal in structure. There is mild mitral valve regurgitation.  Tricuspid Valve: The tricuspid valve was not well visualized. No evidence of tricuspid regurgitation.  Pulmonic Valve: The pulmonic valve is not well visualized. There is no indication of pulmonic valve regurgitation.  Pericardium: There is no pericardial effusion noted.  Aorta: The aortic root is normal.  Systemic Veins: The inferior vena cava appears to be of normal size. There is IVC inspiratory collapse greater than 50%.      CONCLUSIONS:  1. Left ventricular systolic function is moderately decreased with a 35-40% estimated ejection fraction.  2. There is global hypokinesis of the left ventricle with minor regional variations.    QUANTITATIVE DATA SUMMARY:  2D MEASUREMENTS:  Normal Ranges:  Ao Root d:     3.10 cm    (2.0-3.7cm)  LAs:           3.00 cm    (2.7-4.0cm)  IVSd:          1.00 cm     (0.6-1.1cm)  LVPWd:         1.23 cm    (0.6-1.1cm)  LVIDd:         5.10 cm    (3.9-5.9cm)  LVIDs:         4.15 cm  LV Mass Index: 102.3 g/m2  LV % FS        18.6 %    LA VOLUME:  Normal Ranges:  LA Vol A4C:        22.8 ml    (22+/-6mL/m2)  LA Vol A2C:        68.8 ml  LA Vol BP:         43.0 ml  LA Vol Index A4C:  10.7ml/m2  LA Vol Index A2C:  32.3 ml/m2  LA Vol Index BP:   20.2 ml/m2  LA Area A4C:       11.2 cm2  LA Area A2C:       21.1 cm2  LA Major Axis A4C: 4.7 cm  LA Major Axis A2C: 5.5 cm  LA Volume Index:   31.3 ml/m2  LA Vol A4C:        22.5 ml  LA Vol A2C:        66.6 ml    M-MODE MEASUREMENTS:  Normal Ranges:  AoV Exc: 1.70 cm (1.5-2.5cm)    AORTA MEASUREMENTS:  Normal Ranges:  AoV Exc: 1.70 cm (1.5-2.5cm)    LV SYSTOLIC FUNCTION BY 2D PLANIMETRY (MOD):  Normal Ranges:  EF-A4C View: 47.2 % (>=55%)  EF-A2C View: 40.3 %  EF-Biplane:  43.1 %    LV DIASTOLIC FUNCTION:  Normal Ranges:  MV Peak E: 1.01 m/s (0.7-1.2 m/s)  MV Peak A: 0.67 m/s (0.42-0.7 m/s)  E/A Ratio: 1.51     (1.0-2.2)    MITRAL VALVE:  Normal Ranges:  MV DT: 211 msec (150-240msec)    MITRAL INSUFFICIENCY:  Normal Ranges:  MR Vmax: 346.00 cm/s    AORTIC VALVE:  Normal Ranges:  AoV Vmax:                1.12 m/s (<=1.7m/s)  AoV Peak P.0 mmHg (<20mmHg)  AoV Mean PG:             3.0 mmHg (1.7-11.5mmHg)  LVOT Max Guy:            0.89 m/s (<=1.1m/s)  AoV VTI:                 19.60 cm (18-25cm)  LVOT VTI:                18.00 cm  LVOT Diameter:           2.10 cm  (1.8-2.4cm)  AoV Area, VTI:           3.18 cm2 (2.5-5.5cm2)  AoV Area,Vmax:           2.76 cm2 (2.5-4.5cm2)  AoV Dimensionless Index: 0.92      RIGHT VENTRICLE:  RV Basal 3.42 cm  RV Mid   2.48 cm  RV Major 7.3 cm  TAPSE:   18.0 mm    PULMONIC VALVE:  Normal Ranges:  PV Accel Time: 113 msec (>120ms)  PV Max Guy:    0.8 m/s  (0.6-0.9m/s)  PV Max P.7 mmHg      [id number] Richy Stokes MD  Electronically signed on 2023 at 11:42:12 AM        Stress Testing: No results found  "for this or any previous visit from the past 1825 days.    Cardiac Catheterization: No results found for this or any previous visit from the past 1825 days.  No results found for this or any previous visit from the past 3650 days.         REVIEW OF SYSTEMS  A 10-point system review was completed and was negative except as noted in the HPI.      VITALS  Vitals:    02/02/24 1517   BP: 138/72   Pulse: 94   SpO2: 96%       PHYSICAL EXAM  General: awake, alert and oriented. No acute distress.   Skin: Skin is warm, dry and intact without rashes or lesions.  HEENT: normocephalic, atraumatic; conjunctivae are clear without exudates or hemorrhage. Sclera is non-icteric. Eyelids are normal in appearance without swelling or lesions. Hearing intact. Nares are patent bilaterally. Moist mucous membranes.   Cardiovascular: heart rate and rhythm are normal. No murmurs, gallops, or rubs are auscultated. S1 and S2 are heard and are of normal intensity. No JVD, no carotid bruits  Respiratory: bilateral lung sounds clear to auscultations without rales, rhonchi, or wheezes. No accessory muscle use or stridor  Gastrointestinal: non-distended, non-tender  Genitourinary: exam deferred  Musculoskeletal: ROM intact, no deformities  Extremities: pulses palpable bilaterally; no swelling or erythema  Neurological: no focal deficits; gait steady  Psychiatric: appropriate mood and affect; good judgment and insight          ASSESSMENT AND PLAN  Assessment/Plan   Problem List Items Addressed This Visit             ICD-10-CM    Chronic systolic heart failure (CMS/HCC) - Primary I50.22     Appears \"warm and dry\"  Continue GDMT with SGLT2i, Entresto, BB, and Spironolactone  Will repeat echo in 2 months to see if LVSF has improved. Patient informed if EF <35%, an ICD will be recommended           Relevant Orders    Transthoracic echo (TTE) complete    Presence of drug coated stent in LAD coronary artery Z95.5    Coronary artery disease due to calcified " coronary lesion I25.10, I25.84     Continue DAPT x 1 year; defer outpatient surgeries until after 7/16/24  Continue high-intensity statin            Patient did discuss getting pain injections at the end of the month by Dr. Mena. Patient's heart failure is stable and patient is compliant with medications. From a Cardiology standpoint, he is approved for the spinal injections as long as patient does not have to hold his plavix. If Dr. Mena is requiring holding of his anti-platelet, patient will need to wait minimum six months due to recent cardiac stent.       RTC: 6 months; will call patient with results of echo      Thank you for allowing me to participate in the care of this patient. Please reach me out if you have any questions or if you need any clarifications regarding the patient's care.    Bhargavi Hannon DNP, APRN, FNP-C  Division of Cardiovascular Medicine  [facility] [place]  [facility]

## 2024-02-01 NOTE — PROGRESS NOTES
"Subjective   Patient ID: Sd Andino is a 53 y.o. male.    HPI Patient is here to establish as a new patient. Patient complains of nocturia x 7-8 times. Currently taking oxybutynin and flomax which has helped with sx. Gemtesa was not helpful..Also has c/o dysuria x 3 months. Low abdominal pain. Recent urinalysis negative for UTI. PSA was \"normal\". ED is not an issue. He does have retrograde ejaculation with Flomax. Patient has never stones. Patient does have CAD and had recent PTCA    Review of Systems   Constitutional:  Negative for chills and fever.   HENT: Negative.     Eyes: Negative.    Respiratory:  Negative for cough and shortness of breath.    Cardiovascular:  Negative for chest pain and leg swelling.   Gastrointestinal:  Negative for nausea.   Endocrine: Negative.    Genitourinary:  Negative for difficulty urinating.        Negative except for documented in HPI   Allergic/Immunologic: Negative.    Neurological:         Alert & oriented X 3   Hematological:         Denies blood thinners   Psychiatric/Behavioral: Negative.         Objective   Physical Exam  Vitals and nursing note reviewed.   Constitutional:       General: He is not in acute distress.     Appearance: Normal appearance.   Pulmonary:      Effort: Pulmonary effort is normal.   Abdominal:      Tenderness: There is no abdominal tenderness.   Genitourinary:     Comments: Kidneys non palpable bilaterally  Bladder non palpable or tender  Scrotum no mass, No hydrocele  Epididymis- No spermatocele. Non Tender.  Testicles: No mass. WNL  Urethra: No discharge  Penis within normal limits... No lesions. CIrcumcised  Prostate - symmetric, no nodules. BENIGN  Seminal Vesicals: No mass.  Sphincter tone: normal  Neurological:      Mental Status: He is alert.         Assessment/Plan   Diagnoses and all orders for this visit:  Dysuria  BPH with obstruction/lower urinary tract symptoms  Nocturia    All available PSA values reviewed, Options discussed. " Questions answered.  PSA requested   Diet changes for prostate health discussed and educational information given. Pros/Cons of prostate health supplements discussed.   Treatment options for LUTS reviewed  discontinue Ditropan  Change Flomax to Uroxatrol  due to retrograde ejaculation  Discussed timed voiding. Discussed fluid and caffeine intake  Treatment options for ED reviewed.  Lifestyle change to help prevent UTIs discussed. Encouraged fluid intake.    F/U with Cysto. Will assess PVR at Cysto

## 2024-02-02 ENCOUNTER — OFFICE VISIT (OUTPATIENT)
Dept: CARDIOLOGY | Facility: CLINIC | Age: 54
End: 2024-02-02
Payer: COMMERCIAL

## 2024-02-02 VITALS
SYSTOLIC BLOOD PRESSURE: 138 MMHG | HEART RATE: 94 BPM | WEIGHT: 210.7 LBS | DIASTOLIC BLOOD PRESSURE: 72 MMHG | OXYGEN SATURATION: 96 % | BODY MASS INDEX: 30.16 KG/M2 | HEIGHT: 70 IN

## 2024-02-02 DIAGNOSIS — I25.84 CORONARY ARTERY DISEASE DUE TO CALCIFIED CORONARY LESION: ICD-10-CM

## 2024-02-02 DIAGNOSIS — I50.22 CHRONIC SYSTOLIC HEART FAILURE (MULTI): Primary | ICD-10-CM

## 2024-02-02 DIAGNOSIS — I25.10 CORONARY ARTERY DISEASE DUE TO CALCIFIED CORONARY LESION: ICD-10-CM

## 2024-02-02 DIAGNOSIS — Z95.5 PRESENCE OF DRUG COATED STENT IN LAD CORONARY ARTERY: ICD-10-CM

## 2024-02-02 PROCEDURE — 3048F LDL-C <100 MG/DL: CPT

## 2024-02-02 PROCEDURE — 3075F SYST BP GE 130 - 139MM HG: CPT

## 2024-02-02 PROCEDURE — 3078F DIAST BP <80 MM HG: CPT

## 2024-02-02 PROCEDURE — 99214 OFFICE O/P EST MOD 30 MIN: CPT

## 2024-02-02 PROCEDURE — 1036F TOBACCO NON-USER: CPT

## 2024-02-02 NOTE — ASSESSMENT & PLAN NOTE
"Appears \"warm and dry\"  Continue GDMT with SGLT2i, Entresto, BB, and Spironolactone  Will repeat echo in 2 months to see if LVSF has improved. Patient informed if EF <35%, an ICD will be recommended    "

## 2024-02-02 NOTE — ASSESSMENT & PLAN NOTE
Continue DAPT x 1 year; defer outpatient surgeries until after 7/16/24  Continue high-intensity statin

## 2024-02-05 ENCOUNTER — OFFICE VISIT (OUTPATIENT)
Dept: UROLOGY | Facility: CLINIC | Age: 54
End: 2024-02-05
Payer: COMMERCIAL

## 2024-02-05 VITALS
DIASTOLIC BLOOD PRESSURE: 72 MMHG | HEIGHT: 70 IN | SYSTOLIC BLOOD PRESSURE: 138 MMHG | BODY MASS INDEX: 30.06 KG/M2 | WEIGHT: 210 LBS

## 2024-02-05 DIAGNOSIS — R35.1 NOCTURIA: ICD-10-CM

## 2024-02-05 DIAGNOSIS — R30.0 DYSURIA: Primary | ICD-10-CM

## 2024-02-05 DIAGNOSIS — N40.1 BPH WITH OBSTRUCTION/LOWER URINARY TRACT SYMPTOMS: ICD-10-CM

## 2024-02-05 DIAGNOSIS — N13.8 BPH WITH OBSTRUCTION/LOWER URINARY TRACT SYMPTOMS: ICD-10-CM

## 2024-02-05 PROCEDURE — 3078F DIAST BP <80 MM HG: CPT | Performed by: UROLOGY

## 2024-02-05 PROCEDURE — 3075F SYST BP GE 130 - 139MM HG: CPT | Performed by: UROLOGY

## 2024-02-05 PROCEDURE — 99204 OFFICE O/P NEW MOD 45 MIN: CPT | Performed by: UROLOGY

## 2024-02-05 PROCEDURE — 1036F TOBACCO NON-USER: CPT | Performed by: UROLOGY

## 2024-02-05 PROCEDURE — 3048F LDL-C <100 MG/DL: CPT | Performed by: UROLOGY

## 2024-02-05 RX ORDER — ALFUZOSIN HYDROCHLORIDE 10 MG/1
10 TABLET, EXTENDED RELEASE ORAL DAILY
COMMUNITY
End: 2024-02-05 | Stop reason: SDUPTHER

## 2024-02-05 RX ORDER — ALFUZOSIN HYDROCHLORIDE 10 MG/1
10 TABLET, EXTENDED RELEASE ORAL DAILY
Qty: 90 TABLET | Refills: 3 | Status: SHIPPED | OUTPATIENT
Start: 2024-02-05

## 2024-02-13 DIAGNOSIS — R07.9 CHEST PAIN: ICD-10-CM

## 2024-02-13 RX ORDER — CLOPIDOGREL BISULFATE 75 MG/1
75 TABLET ORAL DAILY
Qty: 90 TABLET | Refills: 3 | Status: SHIPPED | OUTPATIENT
Start: 2024-02-13 | End: 2025-02-12

## 2024-02-13 RX ORDER — ATORVASTATIN CALCIUM 40 MG/1
40 TABLET, FILM COATED ORAL NIGHTLY
Qty: 90 TABLET | Refills: 3 | Status: SHIPPED | OUTPATIENT
Start: 2024-02-13 | End: 2025-02-12

## 2024-02-14 ENCOUNTER — OFFICE VISIT (OUTPATIENT)
Dept: PAIN MEDICINE | Facility: CLINIC | Age: 54
End: 2024-02-14
Payer: COMMERCIAL

## 2024-02-14 VITALS
BODY MASS INDEX: 29.7 KG/M2 | SYSTOLIC BLOOD PRESSURE: 150 MMHG | DIASTOLIC BLOOD PRESSURE: 88 MMHG | WEIGHT: 207 LBS | HEART RATE: 35 BPM

## 2024-02-14 DIAGNOSIS — S83.142S: ICD-10-CM

## 2024-02-14 DIAGNOSIS — M47.812 SPONDYLOSIS OF CERVICAL JOINT WITHOUT MYELOPATHY: ICD-10-CM

## 2024-02-14 DIAGNOSIS — M17.12 OSTEOARTHRITIS OF LEFT KNEE, UNSPECIFIED OSTEOARTHRITIS TYPE: Primary | ICD-10-CM

## 2024-02-14 DIAGNOSIS — M54.12 CERVICAL RADICULITIS: ICD-10-CM

## 2024-02-14 DIAGNOSIS — M12.562 TRAUMATIC ARTHROPATHY OF LEFT KNEE: ICD-10-CM

## 2024-02-14 PROBLEM — S83.142A: Status: ACTIVE | Noted: 2024-02-14

## 2024-02-14 PROCEDURE — G0463 HOSPITAL OUTPT CLINIC VISIT: HCPCS | Performed by: PHYSICIAN ASSISTANT

## 2024-02-14 PROCEDURE — 99213 OFFICE O/P EST LOW 20 MIN: CPT | Performed by: PHYSICIAN ASSISTANT

## 2024-02-14 RX ORDER — OXYCODONE AND ACETAMINOPHEN 5; 325 MG/1; MG/1
1-2 TABLET ORAL DAILY PRN
Qty: 60 TABLET | Refills: 0 | Status: SHIPPED | OUTPATIENT
Start: 2024-02-16 | End: 2024-03-19 | Stop reason: SDUPTHER

## 2024-02-14 ASSESSMENT — PATIENT HEALTH QUESTIONNAIRE - PHQ9
2. FEELING DOWN, DEPRESSED OR HOPELESS: NOT AT ALL
SUM OF ALL RESPONSES TO PHQ9 QUESTIONS 1 AND 2: 0
1. LITTLE INTEREST OR PLEASURE IN DOING THINGS: NOT AT ALL

## 2024-02-14 ASSESSMENT — ENCOUNTER SYMPTOMS
PSYCHIATRIC NEGATIVE: 1
ENDOCRINE NEGATIVE: 1
ALLERGIC/IMMUNOLOGIC NEGATIVE: 1
EYES NEGATIVE: 1
GASTROINTESTINAL NEGATIVE: 1
ARTHRALGIAS: 1
CONSTITUTIONAL NEGATIVE: 1
CARDIOVASCULAR NEGATIVE: 1
HEMATOLOGIC/LYMPHATIC NEGATIVE: 1
RESPIRATORY NEGATIVE: 1

## 2024-02-14 NOTE — PROGRESS NOTES
Subjective   Patient ID: Sd Andino is a 53 y.o. male who presents for Pain (WORKERS COMP, LEFT KNEE PAIN, ACHING THROBBING DISCOMFORT AT TIMES, ).TAKING PERCOCET FOR PAIN, INJECTIONS GIVE HIM RELIEF FOR ABOUT 3 MONTHS, WALKING,STANDING,BENDING INCREASE PAIN, HE HAS A BRACE TO WEAR ON AND OFF, NOTICED MORE SWELLING AFTER AN ACTIVE DAY, PAIN SCORE 4/10, ZONIA=38%, DEP NO, SMOKING NO    Ghada Palomo, JAVIER 02/14/24 2:23 PM     Patient is a 53-year-old male.  He has a past medical history significant for left knee arthritis.  He has a [facility] diagnosis of left knee traumatic arthropathy-M12.562.  He uses Percocet 5/325 twice daily as needed pain.  He tolerates this well.  It helps him.  Unfortunately, his left knee replacement that was scheduled had to be canceled.  He ended up needing cardiac stents.  He is now on a blood thinner and he is not able to stop it for a year.  Patient states at this time he is just here today to get set up to have another injection and to continue on his medications.  He is once again hopeful that once he is able to do the knee replacement he will be able to come off the medications.  His knee pain affects his ambulatory status.  This affects his quality of life.  He rates it a 4-8/10.  It gets worse with the more he does.  This affects his quality of life.  This affects his activities.        Review of Systems   Constitutional: Negative.    HENT: Negative.     Eyes: Negative.    Respiratory: Negative.     Cardiovascular: Negative.    Gastrointestinal: Negative.    Endocrine: Negative.    Genitourinary: Negative.    Musculoskeletal:  Positive for arthralgias and gait problem.   Skin: Negative.    Allergic/Immunologic: Negative.    Hematological: Negative.    Psychiatric/Behavioral: Negative.         Objective   Physical Exam  Vitals and nursing note reviewed.   Constitutional:       Appearance: Normal appearance.   HENT:      Head: Normocephalic and atraumatic.      Right Ear: External ear  normal.      Left Ear: External ear normal.      Nose: Nose normal.      Mouth/Throat:      Pharynx: Oropharynx is clear.   Eyes:      Pupils: Pupils are equal, round, and reactive to light.   Cardiovascular:      Rate and Rhythm: Normal rate and regular rhythm.      Pulses: Normal pulses.   Pulmonary:      Effort: Pulmonary effort is normal.   Abdominal:      General: Abdomen is flat.   Musculoskeletal:         General: Normal range of motion.      Cervical back: Normal range of motion.      Comments: 5/5 strength  Pain with movement of the knee  Pain with flexion and extension   Skin:     General: Skin is warm and dry.   Neurological:      General: No focal deficit present.      Mental Status: He is alert and oriented to person, place, and time. Mental status is at baseline.   Psychiatric:         Mood and Affect: Mood normal.         Behavior: Behavior normal.         Thought Content: Thought content normal.         Judgment: Judgment normal.       XR knee left 3 views  Status: Final result     PACS Images     Show images for XR knee left 3 views  Signed by    Signed Time Phone Pager   Juni Guidry MD 12/22/2023 18:34 [phone]     Exam Information    Status Exam Begun Exam Ended   Final 12/21/2023 11:16 12/21/2023 11:20     Study Result    Narrative & Impression   Interpreted By:  Juni Guidry,   STUDY:  XR KNEE LEFT 3 VIEWS; ;  12/21/2023 11:20 am      INDICATION:  Signs/Symptoms:pre-op xray.      COMPARISON:  06/01/2023      ACCESSION NUMBER(S):  [id number]      ORDERING CLINICIAN:  SANDOVAL VIERA      FINDINGS:  Remote post traumatic and postsurgical deformity in the tibia. No  acute fracture seen. There is moderate to severe tricompartmental  joint space narrowing with osteophytosis. There is a small effusion.      IMPRESSION:  Remote postsurgical and posttraumatic changes in the left tibia.  Moderate to severe osteoarthritis          MACRO:  None      Signed by: Juni Guidry 12/22/2023  6:34 PM  Dictation workstation:   [id number]     Assessment/Plan   Diagnoses and all orders for this visit:  Osteoarthritis of left knee, unspecified osteoarthritis type  Traumatic arthropathy of left knee  Lateral subluxation of proximal end of left tibia, sequela       Patient is a 53-year-old male with a past medical history significant for left knee pain, traumatic left knee arthritis and tibial injury.  This is a [facility] injury.  Unfortunately, his replacement had to be postponed due to cardiac stents being placed.  He is now on a blood thinner.  He has to wait a year to be able to stop the blood thinner.  At this time, he continues on Percocet 5/325 once a day as needed.  OARRS was reviewed and is appropriate.  Narcan has previously been offered and declined.  Most recent UDS was reviewed and is appropriate.  We are going to get authorization to pursue a repeat injection as historically, these have worked well for him.  The last 1 was postponed to have the replacement within the replacement had to be postponed.  We will pursue these until he is able to once again get set up to have the replacement.  A refill will be sent to the pharmacy.  Otherwise he will follow-up for the injection once authorization has been obtained.

## 2024-02-19 ENCOUNTER — TELEPHONE (OUTPATIENT)
Dept: CARDIOLOGY | Facility: CLINIC | Age: 54
End: 2024-02-19
Payer: COMMERCIAL

## 2024-02-19 NOTE — TELEPHONE ENCOUNTER
Sd Burk called and said that [facility] is requesting that you make an addendum to your last note saying that Sd could have his injection on the 29th and then we need to fax that note to Dr. Leon's office at [facility].    Could you please make those changes if your ok with that.    Thank you!

## 2024-02-20 NOTE — PROGRESS NOTES
DYSURIA  Patient ID: Sd Andino is a 53 y.o. male.    Procedures  The patient was prepped using a Betadine solution. Lidocaine jelly was instilled into the urethra. The flexible cystoscope was sterilely inserted into the urethra and formal cystoscopy performed in a systematic fashion. . For detailed findings of the procedure, please see Dr. Linares remarks below  CIPRO 250MG POBID ITMES 3 DAYS GIVEN    NO MASS. NO MEDIAN LOBE. MINIMAL TRABECULATION. NO STONE    DDAVP 0.2MG rX GIVEN    F/U VIRTUAL WITH BMP

## 2024-03-04 ENCOUNTER — PROCEDURE VISIT (OUTPATIENT)
Dept: UROLOGY | Facility: CLINIC | Age: 54
End: 2024-03-04
Payer: COMMERCIAL

## 2024-03-04 VITALS — BODY MASS INDEX: 29.78 KG/M2 | HEIGHT: 70 IN | WEIGHT: 208 LBS

## 2024-03-04 DIAGNOSIS — R30.0 DYSURIA: ICD-10-CM

## 2024-03-04 DIAGNOSIS — R35.1 NOCTURIA: Primary | ICD-10-CM

## 2024-03-04 PROCEDURE — 52000 CYSTOURETHROSCOPY: CPT | Performed by: UROLOGY

## 2024-03-04 RX ORDER — CIPROFLOXACIN 250 MG/1
250 TABLET, FILM COATED ORAL 2 TIMES DAILY
Qty: 6 TABLET | Refills: 0 | Status: SHIPPED | OUTPATIENT
Start: 2024-03-04 | End: 2024-03-07

## 2024-03-04 RX ORDER — ALFUZOSIN HYDROCHLORIDE 10 MG/1
10 TABLET, EXTENDED RELEASE ORAL DAILY
Qty: 90 TABLET | Refills: 2 | Status: SHIPPED | OUTPATIENT
Start: 2024-03-04 | End: 2025-03-04

## 2024-03-04 RX ORDER — DESMOPRESSIN ACETATE 0.2 MG/1
0.2 TABLET ORAL NIGHTLY
Qty: 90 TABLET | Refills: 3 | Status: SHIPPED | OUTPATIENT
Start: 2024-03-04 | End: 2024-04-03

## 2024-03-04 RX ORDER — OXYBUTYNIN CHLORIDE 10 MG/1
10 TABLET, EXTENDED RELEASE ORAL DAILY
Qty: 90 TABLET | Refills: 3 | Status: SHIPPED | OUTPATIENT
Start: 2024-03-04 | End: 2024-04-16 | Stop reason: ALTCHOICE

## 2024-03-08 ASSESSMENT — ENCOUNTER SYMPTOMS
ALLERGIC/IMMUNOLOGIC NEGATIVE: 1
SHORTNESS OF BREATH: 0
CHILLS: 0
PSYCHIATRIC NEGATIVE: 1
ENDOCRINE NEGATIVE: 1
EYES NEGATIVE: 1
COUGH: 0
DIFFICULTY URINATING: 0
FEVER: 0
NAUSEA: 0

## 2024-03-08 NOTE — PROGRESS NOTES
Subjective   Patient ID: Sd Andino is a 53 y.o. male.  Virtual or Telephone Consent    A telephone visit (audio only) between the patient (at the originating site) and the provider (at the distant site) was utilized to provide this telehealth service.   Verbal consent was requested and obtained from Sd Andino on this date, 03/11/24 for a telehealth visit.   HPI  Patient is here for med check and bmp results. He was given DDAVP 0.2mg and states he has noticed improvement. Sodium level was 140 on 3/24.  Normal cysto on 3/24. He is also taking Uroxatral. . He has failed Oxybutynin and Gemtesa. No recent PSA has been done      Review of Systems   Constitutional:  Negative for chills and fever.   HENT: Negative.     Eyes: Negative.    Respiratory:  Negative for cough and shortness of breath.    Cardiovascular:  Negative for chest pain and leg swelling.   Gastrointestinal:  Negative for nausea.   Endocrine: Negative.    Genitourinary:  Negative for difficulty urinating.        Negative except for documented in HPI   Allergic/Immunologic: Negative.    Neurological:         Alert & oriented X 3   Hematological:         Denies blood thinners   Psychiatric/Behavioral: Negative.         Objective   Physical Exam  No PE done given the virtual nature of visit.   Assessment/Plan         Diagnoses and all orders for this visit:  Nocturia  BPH with obstruction/lower urinary tract symptoms  Urinary frequency      All available PSA values reviewed, Options discussed. Questions answered.  PSA ordered again   Diet changes for prostate health discussed and educational information given. Pros/Cons of prostate health supplements discussed.   Treatment options for LUTS reviewed  UA from  11/23 reviewed  Continue Uroxatrol and DDAVP  Discussed timed voiding. Discussed fluid and caffeine intake  Treatment options for ED reviewed.  Lifestyle change to help prevent UTIs discussed. Encouraged fluid intake.    F/U  3 months with BMP  and PSA

## 2024-03-09 ENCOUNTER — LAB (OUTPATIENT)
Dept: LAB | Facility: LAB | Age: 54
End: 2024-03-09
Payer: COMMERCIAL

## 2024-03-09 DIAGNOSIS — R35.1 NOCTURIA: ICD-10-CM

## 2024-03-09 LAB
ANION GAP SERPL CALC-SCNC: 13 MMOL/L (ref 10–20)
BUN SERPL-MCNC: 21 MG/DL (ref 6–23)
CALCIUM SERPL-MCNC: 9 MG/DL (ref 8.6–10.3)
CHLORIDE SERPL-SCNC: 106 MMOL/L (ref 98–107)
CO2 SERPL-SCNC: 25 MMOL/L (ref 21–32)
CREAT SERPL-MCNC: 1.04 MG/DL (ref 0.5–1.3)
EGFRCR SERPLBLD CKD-EPI 2021: 87 ML/MIN/1.73M*2
GLUCOSE SERPL-MCNC: 179 MG/DL (ref 74–99)
POTASSIUM SERPL-SCNC: 4.2 MMOL/L (ref 3.5–5.3)
SODIUM SERPL-SCNC: 140 MMOL/L (ref 136–145)

## 2024-03-09 PROCEDURE — 80048 BASIC METABOLIC PNL TOTAL CA: CPT

## 2024-03-09 PROCEDURE — 36415 COLL VENOUS BLD VENIPUNCTURE: CPT

## 2024-03-11 ENCOUNTER — TELEMEDICINE (OUTPATIENT)
Dept: UROLOGY | Facility: CLINIC | Age: 54
End: 2024-03-11
Payer: COMMERCIAL

## 2024-03-11 DIAGNOSIS — R35.0 URINARY FREQUENCY: ICD-10-CM

## 2024-03-11 DIAGNOSIS — R35.1 NOCTURIA: ICD-10-CM

## 2024-03-11 DIAGNOSIS — N40.1 BPH WITH OBSTRUCTION/LOWER URINARY TRACT SYMPTOMS: ICD-10-CM

## 2024-03-11 DIAGNOSIS — N13.8 BPH WITH OBSTRUCTION/LOWER URINARY TRACT SYMPTOMS: ICD-10-CM

## 2024-03-11 PROCEDURE — 1036F TOBACCO NON-USER: CPT | Performed by: UROLOGY

## 2024-03-11 PROCEDURE — 99442 PR PHYS/QHP TELEPHONE EVALUATION 11-20 MIN: CPT | Performed by: UROLOGY

## 2024-03-11 PROCEDURE — 3048F LDL-C <100 MG/DL: CPT | Performed by: UROLOGY

## 2024-03-13 ENCOUNTER — APPOINTMENT (OUTPATIENT)
Dept: PAIN MEDICINE | Facility: CLINIC | Age: 54
End: 2024-03-13
Payer: COMMERCIAL

## 2024-03-15 ENCOUNTER — APPOINTMENT (OUTPATIENT)
Dept: ORTHOPEDIC SURGERY | Facility: CLINIC | Age: 54
End: 2024-03-15
Payer: COMMERCIAL

## 2024-03-18 ENCOUNTER — TELEPHONE (OUTPATIENT)
Dept: PAIN MEDICINE | Facility: CLINIC | Age: 54
End: 2024-03-18
Payer: COMMERCIAL

## 2024-03-18 DIAGNOSIS — M54.12 CERVICAL RADICULITIS: ICD-10-CM

## 2024-03-18 DIAGNOSIS — M47.812 SPONDYLOSIS OF CERVICAL JOINT WITHOUT MYELOPATHY: ICD-10-CM

## 2024-03-19 RX ORDER — OXYCODONE AND ACETAMINOPHEN 5; 325 MG/1; MG/1
1-2 TABLET ORAL DAILY PRN
Qty: 60 TABLET | Refills: 0 | Status: SHIPPED | OUTPATIENT
Start: 2024-03-19 | End: 2024-04-23 | Stop reason: SDUPTHER

## 2024-03-19 RX ORDER — DICLOFENAC SODIUM 10 MG/G
4 GEL TOPICAL 4 TIMES DAILY PRN
Qty: 300 G | Refills: 0 | Status: SHIPPED | OUTPATIENT
Start: 2024-03-19 | End: 2024-04-23 | Stop reason: SDUPTHER

## 2024-03-21 ENCOUNTER — APPOINTMENT (OUTPATIENT)
Dept: CARDIOLOGY | Facility: CLINIC | Age: 54
End: 2024-03-21
Payer: COMMERCIAL

## 2024-04-02 ENCOUNTER — HOSPITAL ENCOUNTER (OUTPATIENT)
Dept: CARDIOLOGY | Facility: HOSPITAL | Age: 54
Discharge: HOME | End: 2024-04-02
Payer: COMMERCIAL

## 2024-04-02 VITALS
HEART RATE: 114 BPM | OXYGEN SATURATION: 99 % | DIASTOLIC BLOOD PRESSURE: 87 MMHG | RESPIRATION RATE: 20 BRPM | SYSTOLIC BLOOD PRESSURE: 120 MMHG

## 2024-04-02 DIAGNOSIS — I50.20 UNSPECIFIED SYSTOLIC (CONGESTIVE) HEART FAILURE (MULTI): ICD-10-CM

## 2024-04-02 DIAGNOSIS — I50.22 CHRONIC SYSTOLIC HEART FAILURE (MULTI): ICD-10-CM

## 2024-04-02 LAB
AORTIC VALVE MEAN GRADIENT: 5 MMHG
AORTIC VALVE PEAK VELOCITY: 1.36 M/S
AV PEAK GRADIENT: 7.4 MMHG
AVA (PEAK VEL): 2.38 CM2
AVA (VTI): 2.96 CM2
EJECTION FRACTION APICAL 4 CHAMBER: 51
LEFT ATRIUM VOLUME AREA LENGTH INDEX BSA: 19.9 ML/M2
LEFT VENTRICLE INTERNAL DIMENSION DIASTOLE: 5.45 CM (ref 3.5–6)
LEFT VENTRICULAR OUTFLOW TRACT DIAMETER: 2 CM
LV EJECTION FRACTION BIPLANE: 47 %
RIGHT VENTRICLE FREE WALL PEAK S': 10.7 CM/S
TRICUSPID ANNULAR PLANE SYSTOLIC EXCURSION: 1.5 CM

## 2024-04-02 PROCEDURE — 93306 TTE W/DOPPLER COMPLETE: CPT

## 2024-04-02 PROCEDURE — 2500000004 HC RX 250 GENERAL PHARMACY W/ HCPCS (ALT 636 FOR OP/ED): Performed by: STUDENT IN AN ORGANIZED HEALTH CARE EDUCATION/TRAINING PROGRAM

## 2024-04-02 PROCEDURE — 93306 TTE W/DOPPLER COMPLETE: CPT | Performed by: STUDENT IN AN ORGANIZED HEALTH CARE EDUCATION/TRAINING PROGRAM

## 2024-04-02 RX ADMIN — PERFLUTREN 2 ML OF DILUTION: 6.52 INJECTION, SUSPENSION INTRAVENOUS at 09:54

## 2024-04-08 ENCOUNTER — TELEPHONE (OUTPATIENT)
Dept: CARDIOLOGY | Facility: CLINIC | Age: 54
End: 2024-04-08
Payer: COMMERCIAL

## 2024-04-08 DIAGNOSIS — I50.22 CHRONIC SYSTOLIC HEART FAILURE (MULTI): Primary | ICD-10-CM

## 2024-04-08 NOTE — TELEPHONE ENCOUNTER
----- Message from Mandi Connolly CMA sent at 4/8/2024  1:48 PM EDT -----  Pt has been notified.   ----- Message -----  From: KEVIN Crook-CNP, DNP  Sent: 4/8/2024   9:21 AM EDT  To: [facility] Clinical Support Staff    Please let Sd know his echo showed ejection fraction between 35-40% where prior was less than 35%, therefore, no indication for ICD at this time. We will continue with medical management at this time. I am going to place a referral to the HF clinic for close monitoring.

## 2024-04-12 ENCOUNTER — TELEPHONE (OUTPATIENT)
Dept: CARDIOLOGY | Facility: CLINIC | Age: 54
End: 2024-04-12
Payer: COMMERCIAL

## 2024-04-12 NOTE — TELEPHONE ENCOUNTER
Spouse Nataliya called office and left voicemail- pt continues to have heart rate 110-120s. Pt is currently on Metoprolol succinate 75mg daily. Pt had echo on 4/2/24. Next appt is 8/6/24. Would you like to make pan changes or see sooner? Thank you.

## 2024-04-16 ENCOUNTER — OFFICE VISIT (OUTPATIENT)
Dept: PAIN MEDICINE | Facility: CLINIC | Age: 54
End: 2024-04-16
Payer: COMMERCIAL

## 2024-04-16 VITALS
SYSTOLIC BLOOD PRESSURE: 143 MMHG | WEIGHT: 202 LBS | BODY MASS INDEX: 28.98 KG/M2 | DIASTOLIC BLOOD PRESSURE: 92 MMHG | HEART RATE: 66 BPM | RESPIRATION RATE: 16 BRPM

## 2024-04-16 DIAGNOSIS — M12.562 TRAUMATIC ARTHROPATHY OF LEFT KNEE: Primary | ICD-10-CM

## 2024-04-16 DIAGNOSIS — M17.12 UNILATERAL PRIMARY OSTEOARTHRITIS, LEFT KNEE: ICD-10-CM

## 2024-04-16 DIAGNOSIS — M17.12 OSTEOARTHRITIS OF LEFT KNEE, UNSPECIFIED OSTEOARTHRITIS TYPE: ICD-10-CM

## 2024-04-16 DIAGNOSIS — M19.90 ARTHRITIS: ICD-10-CM

## 2024-04-16 PROCEDURE — 2500000004 HC RX 250 GENERAL PHARMACY W/ HCPCS (ALT 636 FOR OP/ED): Performed by: PHYSICIAN ASSISTANT

## 2024-04-16 PROCEDURE — 20610 DRAIN/INJ JOINT/BURSA W/O US: CPT | Performed by: PHYSICIAN ASSISTANT

## 2024-04-16 RX ORDER — BUPIVACAINE HYDROCHLORIDE 5 MG/ML
3 INJECTION, SOLUTION EPIDURAL; INTRACAUDAL ONCE
Status: COMPLETED | OUTPATIENT
Start: 2024-04-16 | End: 2024-04-16

## 2024-04-16 RX ORDER — TRIAMCINOLONE ACETONIDE 40 MG/ML
20 INJECTION, SUSPENSION INTRA-ARTICULAR; INTRAMUSCULAR ONCE
Status: COMPLETED | OUTPATIENT
Start: 2024-04-16 | End: 2024-04-16

## 2024-04-16 RX ADMIN — BUPIVACAINE HYDROCHLORIDE 15 MG: 5 INJECTION, SOLUTION EPIDURAL; INTRACAUDAL; PERINEURAL at 09:34

## 2024-04-16 RX ADMIN — TRIAMCINOLONE ACETONIDE 20 MG: 40 INJECTION, SUSPENSION INTRA-ARTICULAR; INTRAMUSCULAR at 09:35

## 2024-04-16 ASSESSMENT — COLUMBIA-SUICIDE SEVERITY RATING SCALE - C-SSRS
6. HAVE YOU EVER DONE ANYTHING, STARTED TO DO ANYTHING, OR PREPARED TO DO ANYTHING TO END YOUR LIFE?: NO
1. IN THE PAST MONTH, HAVE YOU WISHED YOU WERE DEAD OR WISHED YOU COULD GO TO SLEEP AND NOT WAKE UP?: NO
2. HAVE YOU ACTUALLY HAD ANY THOUGHTS OF KILLING YOURSELF?: NO

## 2024-04-16 ASSESSMENT — ENCOUNTER SYMPTOMS
EYES NEGATIVE: 1
RESPIRATORY NEGATIVE: 1
HEMATOLOGIC/LYMPHATIC NEGATIVE: 1
NEUROLOGICAL NEGATIVE: 1
PSYCHIATRIC NEGATIVE: 1
JOINT SWELLING: 1
ARTHRALGIAS: 1
ENDOCRINE NEGATIVE: 1
CARDIOVASCULAR NEGATIVE: 1
CONSTITUTIONAL NEGATIVE: 1
MYALGIAS: 1
ALLERGIC/IMMUNOLOGIC NEGATIVE: 1
GASTROINTESTINAL NEGATIVE: 1

## 2024-04-16 ASSESSMENT — PAIN SCALES - GENERAL: PAINLEVEL_OUTOF10: 7

## 2024-04-16 NOTE — PROGRESS NOTES
Subjective   Patient ID: Sd Andino is a 53 y.o. male who presents for Pain (FUV for L knee pain; pain score 5/10 today. Pain is described as a sharp/throbbing pain. Walking increases pain. Ice and his tens unit will help decrease the pain. ORT = 0. ZONIA = 36/100. ETOH screen negative. MMA done. ).    Cherry Covington RN 04/16/24 9:29 AM     Patient is a 53-year-old male.  He has a past medical history significant for left knee arthritis.  He has a [facility] diagnosis of left knee traumatic arthropathy-M12.562.  He uses Percocet 5/325 twice daily as needed pain.  He tolerates this well.  It gives some improvement.  Historically, he was scheduled for knee replacement but unfortunately, his left knee replacement that was scheduled had to be canceled.  He ended up needing cardiac stents.  He is now on a blood thinner and he is not able to stop it for a year.    At this time, he rates the left knee pain at 8/10 and is eager to have an injection once again as this works well for him.  He wants to pursue replacement soon as possible but right now, to get some pain relief while he awaits the replacement he is eager to pursue another injection.        Review of Systems   Constitutional: Negative.    HENT: Negative.     Eyes: Negative.    Respiratory: Negative.     Cardiovascular: Negative.    Gastrointestinal: Negative.    Endocrine: Negative.    Genitourinary: Negative.    Musculoskeletal:  Positive for arthralgias, joint swelling and myalgias.   Skin: Negative.    Allergic/Immunologic: Negative.    Neurological: Negative.    Hematological: Negative.    Psychiatric/Behavioral: Negative.         Objective   Physical Exam  Vitals and nursing note reviewed.   Constitutional:       Appearance: Normal appearance.   HENT:      Head: Normocephalic and atraumatic.      Right Ear: External ear normal.      Left Ear: External ear normal.      Nose: Nose normal.      Mouth/Throat:      Pharynx: Oropharynx is clear.   Eyes:       Conjunctiva/sclera: Conjunctivae normal.   Cardiovascular:      Rate and Rhythm: Normal rate and regular rhythm.      Pulses: Normal pulses.   Pulmonary:      Effort: Pulmonary effort is normal.   Musculoskeletal:         General: Normal range of motion.      Cervical back: Normal range of motion.      Comments: 5/5 strength  Pain with movement of the knee  Pain with flexion and extension      Skin:     General: Skin is warm and dry.   Neurological:      General: No focal deficit present.      Mental Status: He is alert and oriented to person, place, and time.   Psychiatric:         Mood and Affect: Mood normal.         Behavior: Behavior normal.         Thought Content: Thought content normal.         Judgment: Judgment normal.       XR knee left 3 views  Status: Final result     PACS Images     Show images for XR knee left 3 views  Signed by    Signed Time Phone Pager   Juni Guidry MD 12/22/2023 18:34 [phone]     Exam Information    Status Exam Begun Exam Ended   Final 12/21/2023 11:16 12/21/2023 11:20     Study Result    Narrative & Impression   Interpreted By:  Juni Guidry,   STUDY:  XR KNEE LEFT 3 VIEWS; ;  12/21/2023 11:20 am      INDICATION:  Signs/Symptoms:pre-op xray.      COMPARISON:  06/01/2023      ACCESSION NUMBER(S):  [id number]      ORDERING CLINICIAN:  SANDOVAL VIERA      FINDINGS:  Remote post traumatic and postsurgical deformity in the tibia. No  acute fracture seen. There is moderate to severe tricompartmental  joint space narrowing with osteophytosis. There is a small effusion.      IMPRESSION:  Remote postsurgical and posttraumatic changes in the left tibia.  Moderate to severe osteoarthritis          MACRO:  None      Signed by: Juni Guidry 12/22/2023 6:34 PM  Dictation workstation:   [id number]       Assessment/Plan   Diagnoses and all orders for this visit:  Osteoarthritis of left knee, unspecified osteoarthritis type  -     triamcinolone acetonide (Kenalog-40)  injection 20 mg  -     bupivacaine PF (Marcaine) 0.5 % (5 mg/mL) injection 15 mg  Arthritis  -     triamcinolone acetonide (Kenalog-40) injection 20 mg  -     bupivacaine PF (Marcaine) 0.5 % (5 mg/mL) injection 15 mg  Traumatic arthropathy of left knee  -     triamcinolone acetonide (Kenalog-40) injection 20 mg  -     bupivacaine PF (Marcaine) 0.5 % (5 mg/mL) injection 15 mg  Unilateral primary osteoarthritis, left knee       Patient is a 53-year-old male with a past medical history significant for left knee pain, traumatic left knee arthritis and tibial injury.  This is a [facility] injury.  Unfortunately, his replacement had to be postponed due to cardiac stents being placed.  He is now on a blood thinner.      At this time, he continues on Percocet 5/325 once a day as needed.  OARRS was reviewed and is appropriate.  Narcan has previously been offered and declined.  Most recent UDS was reviewed and is appropriate.      He is scheduled to undergo left knee injection today.  Please refer to separate report.  He will follow-up in 3 months.  Call clinic sooner if necessary.

## 2024-04-16 NOTE — PROGRESS NOTES
Patient ID: Sd Andino is a 53 y.o. male.    Joint Injection/Aspiration    Date/Time: 4/16/2024 9:57 AM    Performed by: Katie Bess PA-C  Authorized by: Katie Bess PA-C    Consent:     Consent obtained:  Verbal and written    Consent given by:  Patient    Risks, benefits, and alternatives were discussed: yes      Risks discussed:  Bleeding, infection and pain    Alternatives discussed:  No treatment  Universal protocol:     Patient identity confirmed:  Verbally with patient  Location:     Location:  Knee    Knee:  L knee  Anesthesia:     Anesthesia method:  None  Procedure details:     Needle gauge:  20 G    Ultrasound guidance: no      Approach:  Lateral    Steroid injected: yes      Specimen collected: no    Post-procedure details:     Dressing:  Adhesive bandage    Procedure completion:  Tolerated well, no immediate complications  Comments:      3 mL of 0.25% bupivacaine and 20 mg of triamcinolone was injected into the left knee.

## 2024-04-22 ENCOUNTER — TELEPHONE (OUTPATIENT)
Dept: PAIN MEDICINE | Facility: CLINIC | Age: 54
End: 2024-04-22

## 2024-04-22 ENCOUNTER — CLINICAL SUPPORT (OUTPATIENT)
Dept: CARDIOLOGY | Facility: HOSPITAL | Age: 54
End: 2024-04-22
Payer: COMMERCIAL

## 2024-04-22 VITALS
DIASTOLIC BLOOD PRESSURE: 95 MMHG | WEIGHT: 204.1 LBS | RESPIRATION RATE: 18 BRPM | OXYGEN SATURATION: 97 % | HEART RATE: 85 BPM | BODY MASS INDEX: 29.29 KG/M2 | SYSTOLIC BLOOD PRESSURE: 134 MMHG

## 2024-04-22 DIAGNOSIS — R00.0 TACHYCARDIA: Primary | ICD-10-CM

## 2024-04-22 DIAGNOSIS — M47.812 SPONDYLOSIS OF CERVICAL JOINT WITHOUT MYELOPATHY: ICD-10-CM

## 2024-04-22 DIAGNOSIS — I50.22 CHRONIC SYSTOLIC HEART FAILURE (MULTI): ICD-10-CM

## 2024-04-22 DIAGNOSIS — I50.22 CHRONIC SYSTOLIC HEART FAILURE (MULTI): Primary | ICD-10-CM

## 2024-04-22 DIAGNOSIS — M54.12 CERVICAL RADICULITIS: ICD-10-CM

## 2024-04-22 PROCEDURE — 99211 OFF/OP EST MAY X REQ PHY/QHP: CPT

## 2024-04-22 RX ORDER — METOPROLOL SUCCINATE 25 MG/1
25 TABLET, EXTENDED RELEASE ORAL DAILY
Qty: 90 TABLET | Refills: 3 | Status: SHIPPED | OUTPATIENT
Start: 2024-04-22 | End: 2024-04-22 | Stop reason: ENTERED-IN-ERROR

## 2024-04-22 NOTE — PATIENT INSTRUCTIONS
Start taking Metoprolol Succinate 125mg daily    Diet  2000 mg (2 gram) sodium diet-Do not add salt to foods or cook with salt. Check labels for Sodium (Na)     Resources  [facility] [phone] Monday - Friday 7:00 am - 3:00 pm  Websites: www.[email or web address]; American Heart Association: www.heart.org    Special Instructions:  If you smoke-Quit!  If you are not able to contact your doctor and need immediate medical attention, call 911  If you are not able to keep you're scheduled appointment at the [facility] Failure [facility], call [phone]  Watch for and report to your doctor all warning signs of Heart Failure (increased difficulty with breathing, 3-5 pound weight gain in one week or less, increased swelling, increased tiredness)  Weigh yourself each day at the same time with the same amount of clothes on. Record your weight log. Bring it with your to each visit    Patient verbalizes understanding for:  Low sodium diet:1500-2000mg daily of sodium, Fluid Restriction, Follow-up appointment with [facility], Weighing self daily, Medications dose and schedule, Signs of increased heart failure and Activity Recommendations

## 2024-04-22 NOTE — PROGRESS NOTES
Sd arrived ambulatory to the [facility] Failure Clinic for his first scheduled visit. Sd recently had a heart catheterization completed with a stent placed. He reports that he is feeling better since then. Sd states that his major complaint is that his heart rate will go to 180's at times. He does report having dizziness and palpitations at this time. He wears an apple watch and this confirmed his high heart rates as well as showed times where his heart rate will be in th 40s. Sd denies any symptoms when his heart rate is low. He denies increased shortness of breath, PND, or Orthopnea. Eating and sleeping without distress. Home medications reviewed. Sd reports that he does a lot of walking for his job, sometimes walking up to 11 miles a day. In August of 2023 Sd had worn a 7 day heart monitor and at the time he was not taking any metoprolol and his heart rate would go to 180s then. Bhargavi Hannon DNP was notified of visit and heart rates and ordered a 5 day Holter monitor. Sd wanted to just try increasing metoprolol to see if this would help as he wears his apple watch everyday and would alert him of his heart rates. Bhargavi Hannon was in agreement. The plan is to start taking Metoprolol Succinate 125mg daily. Sd currently has a 90 day supply of 25mg and will take 5 tablets daily. Sd will follow up in 1 month. Reviewed signs and symptoms of increased heart failure and when to call for help.    Vitals:  BP: 134/95           HR: 85           Resp: 18          SPO2: 97% on room air       Weight: 204.1lbs                             Lung Sounds: clear    Edema: none    JVD: absent     Labs: none, will be getting lab drawn with his PCP    Medications titration: Metoprolol 125mg daily    Next Appointment Date: May 20, 2024@ 10am    Note in EMR for treating Physician: Dr. Stokes/ Bhargavi aHnnon DNP    In-House Supervising Physician: Dr. Szymanski

## 2024-04-23 RX ORDER — OXYCODONE AND ACETAMINOPHEN 5; 325 MG/1; MG/1
1-2 TABLET ORAL DAILY PRN
Qty: 60 TABLET | Refills: 0 | Status: SHIPPED | OUTPATIENT
Start: 2024-04-23 | End: 2024-05-30 | Stop reason: SDUPTHER

## 2024-04-23 RX ORDER — DICLOFENAC SODIUM 10 MG/G
4 GEL TOPICAL 4 TIMES DAILY PRN
Qty: 300 G | Refills: 0 | Status: SHIPPED | OUTPATIENT
Start: 2024-04-23

## 2024-05-02 DIAGNOSIS — I50.22 CHRONIC SYSTOLIC HEART FAILURE (MULTI): ICD-10-CM

## 2024-05-02 RX ORDER — METOPROLOL SUCCINATE 100 MG/1
100 TABLET, EXTENDED RELEASE ORAL DAILY
Qty: 90 TABLET | Refills: 3 | Status: SHIPPED | OUTPATIENT
Start: 2024-05-02 | End: 2025-05-02

## 2024-05-02 RX ORDER — METOPROLOL SUCCINATE 25 MG/1
25 TABLET, EXTENDED RELEASE ORAL DAILY
Qty: 90 TABLET | Refills: 3 | Status: SHIPPED | OUTPATIENT
Start: 2024-05-02 | End: 2025-05-02

## 2024-05-20 ENCOUNTER — APPOINTMENT (OUTPATIENT)
Dept: CARDIAC REHAB | Facility: HOSPITAL | Age: 54
End: 2024-05-20
Payer: COMMERCIAL

## 2024-05-30 ENCOUNTER — TELEPHONE (OUTPATIENT)
Dept: PAIN MEDICINE | Facility: CLINIC | Age: 54
End: 2024-05-30
Payer: COMMERCIAL

## 2024-05-30 DIAGNOSIS — M47.812 SPONDYLOSIS OF CERVICAL JOINT WITHOUT MYELOPATHY: ICD-10-CM

## 2024-05-30 DIAGNOSIS — M54.12 CERVICAL RADICULITIS: ICD-10-CM

## 2024-05-30 RX ORDER — OXYCODONE AND ACETAMINOPHEN 5; 325 MG/1; MG/1
1-2 TABLET ORAL DAILY PRN
Qty: 60 TABLET | Refills: 0 | Status: SHIPPED | OUTPATIENT
Start: 2024-05-30

## 2024-06-04 ENCOUNTER — LAB (OUTPATIENT)
Dept: LAB | Facility: LAB | Age: 54
End: 2024-06-04
Payer: COMMERCIAL

## 2024-06-04 DIAGNOSIS — R35.1 NOCTURIA: ICD-10-CM

## 2024-06-04 LAB
ANION GAP SERPL CALC-SCNC: 12 MMOL/L (ref 10–20)
BUN SERPL-MCNC: 19 MG/DL (ref 6–23)
CALCIUM SERPL-MCNC: 9.5 MG/DL (ref 8.6–10.3)
CHLORIDE SERPL-SCNC: 105 MMOL/L (ref 98–107)
CO2 SERPL-SCNC: 29 MMOL/L (ref 21–32)
CREAT SERPL-MCNC: 1.08 MG/DL (ref 0.5–1.3)
EGFRCR SERPLBLD CKD-EPI 2021: 83 ML/MIN/1.73M*2
GLUCOSE SERPL-MCNC: 149 MG/DL (ref 74–99)
POTASSIUM SERPL-SCNC: 4.4 MMOL/L (ref 3.5–5.3)
PSA SERPL-MCNC: 0.98 NG/ML
SODIUM SERPL-SCNC: 142 MMOL/L (ref 136–145)

## 2024-06-04 PROCEDURE — 84153 ASSAY OF PSA TOTAL: CPT

## 2024-06-04 PROCEDURE — 36415 COLL VENOUS BLD VENIPUNCTURE: CPT

## 2024-06-04 PROCEDURE — 80048 BASIC METABOLIC PNL TOTAL CA: CPT

## 2024-06-07 ASSESSMENT — ENCOUNTER SYMPTOMS
COUGH: 0
CHILLS: 0
ALLERGIC/IMMUNOLOGIC NEGATIVE: 1
DIFFICULTY URINATING: 0
FEVER: 0
ENDOCRINE NEGATIVE: 1
PSYCHIATRIC NEGATIVE: 1
SHORTNESS OF BREATH: 0
NAUSEA: 0
EYES NEGATIVE: 1

## 2024-06-07 NOTE — PROGRESS NOTES
Subjective   Patient ID: Sd Andino is a 53 y.o. male.    HPI  Patient is here for med check and bmp results. He was given DDAVP 0.2mg and states he has noticed improvement. Sodium level was  on 6/24. Prior Sodium was 140 on 3/24.  Normal cysto on 3/24. He is also taking Uroxatral. . He has failed Oxybutynin and Gemtesa.  Most recent PSA was 0.98 on 6/4/24.          Review of Systems   Constitutional:  Negative for chills and fever.   HENT: Negative.     Eyes: Negative.    Respiratory:  Negative for cough and shortness of breath.    Cardiovascular:  Negative for chest pain and leg swelling.   Gastrointestinal:  Negative for nausea.   Endocrine: Negative.    Genitourinary:  Negative for difficulty urinating.        Negative except for documented in HPI   Allergic/Immunologic: Negative.    Neurological:         Alert & oriented X 3   Hematological:         Denies blood thinners   Psychiatric/Behavioral: Negative.         Objective   Physical Exam    Assessment/Plan   There are no diagnoses linked to this encounter.

## 2024-06-10 ENCOUNTER — APPOINTMENT (OUTPATIENT)
Dept: UROLOGY | Facility: CLINIC | Age: 54
End: 2024-06-10
Payer: COMMERCIAL

## 2024-06-10 DIAGNOSIS — N13.8 BPH WITH OBSTRUCTION/LOWER URINARY TRACT SYMPTOMS: ICD-10-CM

## 2024-06-10 DIAGNOSIS — N40.1 BPH WITH OBSTRUCTION/LOWER URINARY TRACT SYMPTOMS: ICD-10-CM

## 2024-06-10 DIAGNOSIS — R35.0 URINARY FREQUENCY: ICD-10-CM

## 2024-06-10 DIAGNOSIS — R35.1 NOCTURIA: ICD-10-CM

## 2024-07-02 ENCOUNTER — TELEPHONE (OUTPATIENT)
Dept: PAIN MEDICINE | Facility: CLINIC | Age: 54
End: 2024-07-02
Payer: COMMERCIAL

## 2024-07-02 DIAGNOSIS — M47.812 SPONDYLOSIS OF CERVICAL JOINT WITHOUT MYELOPATHY: ICD-10-CM

## 2024-07-02 DIAGNOSIS — M54.12 CERVICAL RADICULITIS: ICD-10-CM

## 2024-07-02 RX ORDER — OXYCODONE AND ACETAMINOPHEN 5; 325 MG/1; MG/1
1-2 TABLET ORAL DAILY PRN
Qty: 60 TABLET | Refills: 0 | Status: SHIPPED | OUTPATIENT
Start: 2024-07-02

## 2024-07-02 RX ORDER — DICLOFENAC SODIUM 10 MG/G
4 GEL TOPICAL 4 TIMES DAILY PRN
Qty: 300 G | Refills: 0 | Status: SHIPPED | OUTPATIENT
Start: 2024-07-02

## 2024-07-16 ENCOUNTER — OFFICE VISIT (OUTPATIENT)
Dept: PAIN MEDICINE | Facility: CLINIC | Age: 54
End: 2024-07-16
Payer: COMMERCIAL

## 2024-07-16 VITALS
DIASTOLIC BLOOD PRESSURE: 87 MMHG | HEART RATE: 106 BPM | RESPIRATION RATE: 16 BRPM | BODY MASS INDEX: 27.98 KG/M2 | SYSTOLIC BLOOD PRESSURE: 124 MMHG | WEIGHT: 195 LBS

## 2024-07-16 DIAGNOSIS — M17.12 UNILATERAL PRIMARY OSTEOARTHRITIS, LEFT KNEE: Primary | ICD-10-CM

## 2024-07-16 DIAGNOSIS — M17.12 OSTEOARTHRITIS OF LEFT KNEE, UNSPECIFIED OSTEOARTHRITIS TYPE: ICD-10-CM

## 2024-07-16 DIAGNOSIS — M12.562 TRAUMATIC ARTHROPATHY OF LEFT KNEE: ICD-10-CM

## 2024-07-16 PROCEDURE — 20610 DRAIN/INJ JOINT/BURSA W/O US: CPT | Performed by: PHYSICIAN ASSISTANT

## 2024-07-16 PROCEDURE — 80307 DRUG TEST PRSMV CHEM ANLYZR: CPT | Mod: SAMLAB | Performed by: PHYSICIAN ASSISTANT

## 2024-07-16 PROCEDURE — 2500000004 HC RX 250 GENERAL PHARMACY W/ HCPCS (ALT 636 FOR OP/ED): Performed by: PHYSICIAN ASSISTANT

## 2024-07-16 RX ORDER — BUPIVACAINE HYDROCHLORIDE 2.5 MG/ML
3 INJECTION, SOLUTION EPIDURAL; INFILTRATION; INTRACAUDAL ONCE
Status: COMPLETED | OUTPATIENT
Start: 2024-07-16 | End: 2024-07-16

## 2024-07-16 RX ORDER — TRIAMCINOLONE ACETONIDE 40 MG/ML
40 INJECTION, SUSPENSION INTRA-ARTICULAR; INTRAMUSCULAR ONCE
Status: COMPLETED | OUTPATIENT
Start: 2024-07-16 | End: 2024-07-16

## 2024-07-16 RX ORDER — BUPIVACAINE HYDROCHLORIDE 5 MG/ML
3 INJECTION, SOLUTION EPIDURAL; INTRACAUDAL ONCE
Status: DISCONTINUED | OUTPATIENT
Start: 2024-07-16 | End: 2024-07-16

## 2024-07-16 ASSESSMENT — ENCOUNTER SYMPTOMS
ENDOCRINE NEGATIVE: 1
ALLERGIC/IMMUNOLOGIC NEGATIVE: 1
EYES NEGATIVE: 1
BACK PAIN: 1
CONSTITUTIONAL NEGATIVE: 1
GASTROINTESTINAL NEGATIVE: 1
ARTHRALGIAS: 1
MYALGIAS: 1
HEMATOLOGIC/LYMPHATIC NEGATIVE: 1
PSYCHIATRIC NEGATIVE: 1
CARDIOVASCULAR NEGATIVE: 1
RESPIRATORY NEGATIVE: 1

## 2024-07-16 ASSESSMENT — PAIN SCALES - GENERAL: PAINLEVEL_OUTOF10: 7

## 2024-07-16 NOTE — PROGRESS NOTES
Patient ID: Sd Andino is a 53 y.o. male.    Joint Injection/Aspiration    Date/Time: 7/16/2024 11:04 AM    Performed by: Katie Bess PA-C  Authorized by: Katie Bess PA-C    Consent:     Consent obtained:  Verbal    Consent given by:  Patient    Risks, benefits, and alternatives were discussed: yes      Risks discussed:  Bleeding, infection and pain    Alternatives discussed:  No treatment, delayed treatment and alternative treatment  Universal protocol:     Patient identity confirmed:  Verbally with patient  Location:     Location:  Knee    Knee:  L knee  Anesthesia:     Anesthesia method:  None  Procedure details:     Needle gauge:  20 G    Approach:  Medial    Steroid injected: yes      Specimen collected: no    Post-procedure details:     Dressing:  Adhesive bandage    Procedure completion:  Tolerated well, no immediate complications  Comments:      3 mL of 0.25% bupivacaine and 40 mg of triamcinolone was injected in the left knee without problem.

## 2024-07-16 NOTE — PROGRESS NOTES
Subjective   Patient ID: Sd Andino is a 53 y.o. male who presents for Pain (FUV for L knee pain; pain score is 4/10 today. Pain is described as a constant ache. Nothing really increases the pain. Ice will help relieve the pain. ZONIA = 34/100. SOAPP = 3. ).     Cherry Covington RN 07/16/24 10:35 AM     Patient is a 53-year-old male.  He has a past medical history significant for left knee arthritis.  He has a [facility] diagnosis of left knee traumatic arthropathy-M12.562.  He uses Percocet 5/325 twice daily as needed pain.  He tolerates this well.  It gives some improvement.  Historically, he was scheduled for knee replacement but unfortunately, his left knee replacement that was scheduled had to be canceled.  He ended up needing cardiac stents.  He is now on a blood thinner and he is not able to stop it for a year.  He underwent left knee injection 3 months ago.  This gave him 60 to 70% relief.  Unfortunate, it has started to wear off.  He has left knee pain.  He rates this a 4/10.  Affects his ambulatory status.  Affects his quality of life.  It is not as bad as it was before but he wants to stay on top of this as he does have a lot of pain with certain activities and certain movements.        Review of Systems   Constitutional: Negative.    HENT: Negative.     Eyes: Negative.    Respiratory: Negative.     Cardiovascular: Negative.    Gastrointestinal: Negative.    Endocrine: Negative.    Genitourinary: Negative.    Musculoskeletal:  Positive for arthralgias, back pain, gait problem and myalgias.   Skin: Negative.    Allergic/Immunologic: Negative.    Hematological: Negative.    Psychiatric/Behavioral: Negative.         Objective   Physical Exam  Vitals and nursing note reviewed.   Constitutional:       Appearance: Normal appearance.   HENT:      Head: Normocephalic and atraumatic.      Right Ear: External ear normal.      Left Ear: External ear normal.      Nose: Nose normal.      Mouth/Throat:      Pharynx:  Oropharynx is clear.   Eyes:      Conjunctiva/sclera: Conjunctivae normal.   Cardiovascular:      Rate and Rhythm: Normal rate and regular rhythm.      Pulses: Normal pulses.      Heart sounds: Normal heart sounds.   Pulmonary:      Effort: Pulmonary effort is normal.   Musculoskeletal:         General: Normal range of motion.      Cervical back: Normal range of motion.      Comments: 5/5 strength with pain with flexion and extension of the left knee   Skin:     General: Skin is warm and dry.   Neurological:      General: No focal deficit present.      Mental Status: He is alert and oriented to person, place, and time. Mental status is at baseline.   Psychiatric:         Mood and Affect: Mood normal.         Behavior: Behavior normal.         Thought Content: Thought content normal.         Judgment: Judgment normal.         XR knee left 3 views  Status: Final result     PACS Images     Show images for XR knee left 3 views  Signed by    Signed Time Phone Pager   Juni Guidry MD 12/22/2023 18:34 [phone]     Exam Information    Status Exam Begun Exam Ended   Final 12/21/2023 11:16 12/21/2023 11:20     Study Result    Narrative & Impression   Interpreted By:  Juni Guidry,   STUDY:  XR KNEE LEFT 3 VIEWS; ;  12/21/2023 11:20 am      INDICATION:  Signs/Symptoms:pre-op xray.      COMPARISON:  06/01/2023      ACCESSION NUMBER(S):  [id number]      ORDERING CLINICIAN:  SANDOVAL VIERA      FINDINGS:  Remote post traumatic and postsurgical deformity in the tibia. No  acute fracture seen. There is moderate to severe tricompartmental  joint space narrowing with osteophytosis. There is a small effusion.      IMPRESSION:  Remote postsurgical and posttraumatic changes in the left tibia.  Moderate to severe osteoarthritis          MACRO:  None      Signed by: Juni Guidry 12/22/2023 6:34 PM  Dictation workstation:   [id number]     Assessment/Plan   Diagnoses and all orders for this visit:  Unilateral primary  osteoarthritis, left knee  -     triamcinolone acetonide (Kenalog-40) injection 40 mg  -     bupivacaine PF (Marcaine) 0.25 % (2.5 mg/mL) injection 7.5 mg  -     Opiate/Opioid/Benzo Prescription Compliance  Traumatic arthropathy of left knee  -     bupivacaine PF (Marcaine) 0.25 % (2.5 mg/mL) injection 7.5 mg  -     Opiate/Opioid/Benzo Prescription Compliance  Osteoarthritis of left knee, unspecified osteoarthritis type        Patient is a 53-year-old male with a past medical history significant for left knee pain, traumatic left knee arthritis and tibial injury.  This is a [facility] injury.  Unfortunately, his replacement had to be postponed due to cardiac stents being placed.  He is now on a blood thinner.  He continues on Percocet 5/325 once a day as needed.  OARRS was reviewed and is appropriate.  Narcan has previously been offered and declined.  Most recent UDS was reviewed and is appropriate.  We will repeat 1 today for compliance purposes.     He is scheduled to undergo left knee injection today.  Please refer to separate report.  He will follow-up in 2 months.  Call clinic sooner if necessary.

## 2024-07-17 LAB
AMPHETAMINES UR QL SCN: NORMAL
BARBITURATES UR QL SCN: NORMAL
BZE UR QL SCN: NORMAL
CANNABINOIDS UR QL SCN: NORMAL
CREAT UR-MCNC: 138.8 MG/DL (ref 20–370)
PCP UR QL SCN: NORMAL

## 2024-08-01 NOTE — PROGRESS NOTES
CHIEF COMPLAINT  6 month follow up     HISTORY OF PRESENT ILLNESS    Cardiovascular hx:    ACC/AHA Stage C HFrEF, ischemic (LVSF 35%)  -NYHA Class II  - Patient presented to [facility] ED on 1/15/24 in the setting of chest pain. Patient underwent a LHC with Dr. Villarreal on 24 showing 90% stenosis. Successful PCI to distal LAD with 1 VALENTIN  -DAPT x 1 year and then stop plavix and continue ASA indefinitely   -Compliant with high-intensity statin; no adverse effects reported  -GDMT includes Jardiance 10mg daily, Toprol XL 125mg daily, high-intensity Entresto, Spironolactone 25mg daily   -BP goal <130/80  -Recent BMP () showing normal kidney function         Patient presents and reports occasional palpitations and increased heart rate. In the office, his resting HR is 110 bpm. Patient denies any chest pain/pressure/discomfort, orthopnea/PND, or lower extremity edema. Occasional dizziness and shortness of breath noted.      Cardiac testing:  Echo ()-LVSF is moderately decreased with an EF of 35-40%; reduced RVSF  2. Event monitor ()-14% burden of premature atrial contractions but no sustained tachyarrhythmias            Cardiovascular testin.        Past Medical, Surgical, and Family History reviewed and updated in chart.     Reviewed all medications by prescribing practitioner or clinical pharmacist (such as prescriptions, OTCs, herbal therapies and supplements) and documented in the medical record.    Past Medical History  Past Medical History:   Diagnosis Date    Ejection fraction < 50% 10/31/2023    Personal history of other diseases of the circulatory system 2019    History of essential hypertension    Type 2 diabetes mellitus without complications (Multi)     Type 2 diabetes mellitus without complication, without long-term current use of insulin       Social History  Social History     Tobacco Use    Smoking status: Never    Smokeless tobacco: Never   Substance  Use Topics    Alcohol use: Yes     Comment: 10 beers yearly; occasionally    Drug use: Never       Family History     Family History   Problem Relation Name Age of Onset    Hypertension Mother      Hypertension Father      Heart attack Other GRANDPARENT     Stroke Other GRANDPARENT         Allergies:  No Known Allergies     Outpatient Medications:  Current Outpatient Medications   Medication Instructions    alfuzosin (UROXATRAL) 10 mg, oral, Daily, Do not crush, chew, or split.    ascorbic acid (VITAMIN C) 500 mg, oral, Daily    aspirin 81 mg, oral, Daily    atorvastatin (LIPITOR) 40 mg, oral, Nightly    blood sugar diagnostic (Accu-Chek Alicia Plus test strp) strip Accu-Chek Alicia Plus In Vitro Strip   Refills: 0        Start : 15-Nov-2017   Active    cholecalciferol (VITAMIN D-3) 50 mcg, oral    clopidogrel (PLAVIX) 75 mg, oral, Daily    desmopressin (DDAVP) 0.2 mg, oral, Nightly    diclofenac sodium (VOLTAREN) 4 g, Topical, 4 times daily PRN    empagliflozin (JARDIANCE) 10 mg, oral, Daily    FreeStyle Romi sensor system (FreeStyle Romi 2 Sensor) kit 1 Device, subcutaneous    glucosamine sulfate 500 mg, oral, 2 times daily    LANCETS-BLOOD GLUCOSE STRIPS MISC Use to check blood sugar 4 times daily    metFORMIN (Glucophage) 1,000 mg tablet 1 tablet, oral, Every 12 hours    metoprolol succinate XL (TOPROL XL) 100 mg, oral, Daily, Do not crush or chew.    metoprolol succinate XL (TOPROL-XL) 50 mg, oral, Daily    montelukast (Singulair) 10 mg tablet 1 tablet, oral, Once as needed, nightly    multivit-minerals/folic/ginkgo (DAILY MULTIPLE ORAL) 1 tablet, oral, Daily    omega3/dha/epa/fish oil/vit D3 (OMEGA-3 PLUS VITAMIN D3 ORAL) 1 capsule, oral, 2 times daily    OneTouch Verio Flex meter misc USE AS DIRECTED.    oxyCODONE-acetaminophen (Percocet) 5-325 mg tablet 1-2 tablets, oral, Daily PRN    Ozempic 1 mg, subcutaneous, Once Weekly, ON HOLD    pantoprazole (ProtoNix) 20 mg EC tablet 1 tablet, oral, Daily     "sacubitriL-valsartan (Entresto)  mg tablet One tablet twice daily    spironolactone (ALDACTONE) 12.5 mg, oral, Daily    syr,ndl,ins,safe 0.5mL,disp un (INSULIN SYRINGE-NEEDLE,DISPOS. MISC) USE ONE SYRINGE ONCE A WEEK    syringe with needle 3 mL 25 gauge x 1\" syringe USE ONE SYRINGE ONCE A WEEK    tadalafil (CIALIS) 5 mg, oral, Daily    tadalafil 20 mg tablet 1 tablet, oral, As needed    testosterone cypionate (DEPO-TESTOSTERONE) 200 mg, Once as needed, Every 10 days    zinc gluconate 50 mg tablet 1 tablet, oral, Daily    zolpidem (Ambien) 10 mg tablet 1 tablet, oral, Nightly PRN          Labs:   CMP:  Recent Labs     06/04/24  1311 03/09/24  1004 01/17/24  0522 11/28/23  1148 09/29/23  1150 02/12/23  0534 02/11/23  2252 02/10/23  0636    140 138 139 140   < > 134* 136   K 4.4 4.2 3.6 3.8 3.9   < > 4.2 4.3    106 104 106 105   < > 100 100   CO2 29 25 26 25 27   < > 24 22   ANIONGAP 12 13 12 12 12   < > 14 18   BUN 19 21 13 24* 18   < > 26* 26*   CREATININE 1.08 1.04 1.05 0.99 1.05   < > 1.11 1.02   EGFR 83 87 86 >90  --   --   --   --    MG  --   --   --   --   --   --  2.04 1.64    < > = values in this interval not displayed.     Recent Labs     01/17/24  0522 06/21/23  1504 02/11/23  2252 02/10/23  0636 02/09/23  1401   ALBUMIN 4.2 4.6 4.1 4.6 4.5   ALKPHOS 104 121* 79 88 112   ALT 14 28 25 21 22   AST 14 20 20 21 16   BILITOT 0.5 0.6 0.5 0.6 0.7     CBC:  Recent Labs     01/17/24  0522 08/29/23  1710 06/21/23  1504 02/12/23  0534 02/11/23  2252   WBC 8.7 10.1 9.3 10.8 15.4*   HGB 16.3 16.4 14.9 13.0* 14.6   HCT 49.3 47.6 42.8 39.4* 43.0    360 384 336 396   MCV 91 92 90 95 94     COAG:   Recent Labs     02/10/23  0636 10/11/21  1644 09/08/21  1614 09/07/21  2111   INR 1.1  --   --  1.3*   DDIMERVTE  --  798* <215 284     ABO: No results for input(s): \"ABO\" in the last 72582 hours.  HEME/ENDO:  Recent Labs     06/21/23  1504 02/11/23  2252 04/12/22  1215 09/19/21  0508 09/08/21  1059 " 09/07/21  2050 06/23/20  1210   FERRITIN  --   --   --   --  3,610* 2,046*  --    TSH  --  0.38* 2.83  --   --   --   --    HGBA1C 7.2*  --  7.2* 8.2*  --   --  8.1      CARDIAC:   Recent Labs     08/29/23  1907 08/29/23  1710 02/11/23  2252   TROPHS 22* 24* 7   BNP  --  279*  --      Recent Labs     01/17/24  0522 04/12/22  1215 09/14/21  0531 06/23/20  1210 11/25/19  0852   CHOL 124 163  --  152 161   LDLF  --  102*  --  90 93   HDL 34.0 34.0*  --  37.0* 36.0*   TRIG 121 136 894* 123 158*     MICRO:   Recent Labs     09/16/21  0546 09/11/21  0428 09/08/21  1614 09/08/21  1059   CRP 1.14* 10.71*  --  30.85*   PROCAL  --   --  1.16*  --      No results found for the last 90 days.    Notable Studies: imaging personally reviewed   EKG:No results found for this or any previous visit (from the past 4464 hour(s)).  Echocardiogram:   [place] [address]  Phone [phone], Fax [phone]    TRANSTHORACIC ECHOCARDIOGRAM REPORT      Patient Name:     MAURICIO Montaño Physician:   [id number] Stokes MD  Study Date:       9/8/2023       Referring Physician: Michelle Stokes MD  MRN/PID:          [id number]       PCP:  Accession/Order#: [id number]      Department Location: [facility]  YOB: 1970      Fellow:  Gender:           M              Nurse:  Admit Date:                      Sonographer:         BRENDA Lawrence RVT  Admission Status: Outpatient     Additional Staff:  Height:           177.80 cm      CC Report to:  Weight:           95.26 kg       Study Type:          Echocardiogram  BSA:              2.13 m2  Blood Pressure: 136 /86 mmHg    Diagnosis/ICD: R06.02-Shortness of breath; R79.89-Elevated Troponin  Indication:    Dyspnea  Procedure/CPT: Echo Complete w Full [id number]    Patient History:  Pertinent History: No previous echo.    Study Detail: The following Echo studies were performed:  M-Mode, 2D, Doppler and  color flow. Definity used as a contrast agent for endocardial  border definition. Total contrast used for this procedure was 1 mL  via IV push. A bubble study was not performed. The patient was  awake.      PHYSICIAN INTERPRETATION:  Left Ventricle: Left ventricular systolic function is moderately decreased, with an estimated ejection fraction of 35-40%. There is global hypokinesis of the left ventricle with minor regional variations. The left ventricular cavity size is normal. Left ventricular diastolic filling was indeterminate.  Left Atrium: The left atrium is normal in size.  Right Ventricle: The right ventricle is normal in size. There is normal right ventricular global systolic function.  Right Atrium: The right atrium is normal in size.  Aortic Valve: The aortic valve is probably trileaflet. There is no evidence of aortic valve regurgitation. The peak instantaneous gradient of the aortic valve is 5.0 mmHg. The mean gradient of the aortic valve is 3.0 mmHg.  Mitral Valve: The mitral valve is normal in structure. There is mild mitral valve regurgitation.  Tricuspid Valve: The tricuspid valve was not well visualized. No evidence of tricuspid regurgitation.  Pulmonic Valve: The pulmonic valve is not well visualized. There is no indication of pulmonic valve regurgitation.  Pericardium: There is no pericardial effusion noted.  Aorta: The aortic root is normal.  Systemic Veins: The inferior vena cava appears to be of normal size. There is IVC inspiratory collapse greater than 50%.      CONCLUSIONS:  1. Left ventricular systolic function is moderately decreased with a 35-40% estimated ejection fraction.  2. There is global hypokinesis of the left ventricle with minor regional variations.    QUANTITATIVE DATA SUMMARY:  2D MEASUREMENTS:  Normal Ranges:  Ao Root d:     3.10 cm    (2.0-3.7cm)  LAs:           3.00 cm    (2.7-4.0cm)  IVSd:          1.00 cm    (0.6-1.1cm)  LVPWd:         1.23 cm     (0.6-1.1cm)  LVIDd:         5.10 cm    (3.9-5.9cm)  LVIDs:         4.15 cm  LV Mass Index: 102.3 g/m2  LV % FS        18.6 %    LA VOLUME:  Normal Ranges:  LA Vol A4C:        22.8 ml    (22+/-6mL/m2)  LA Vol A2C:        68.8 ml  LA Vol BP:         43.0 ml  LA Vol Index A4C:  10.7ml/m2  LA Vol Index A2C:  32.3 ml/m2  LA Vol Index BP:   20.2 ml/m2  LA Area A4C:       11.2 cm2  LA Area A2C:       21.1 cm2  LA Major Axis A4C: 4.7 cm  LA Major Axis A2C: 5.5 cm  LA Volume Index:   31.3 ml/m2  LA Vol A4C:        22.5 ml  LA Vol A2C:        66.6 ml    M-MODE MEASUREMENTS:  Normal Ranges:  AoV Exc: 1.70 cm (1.5-2.5cm)    AORTA MEASUREMENTS:  Normal Ranges:  AoV Exc: 1.70 cm (1.5-2.5cm)    LV SYSTOLIC FUNCTION BY 2D PLANIMETRY (MOD):  Normal Ranges:  EF-A4C View: 47.2 % (>=55%)  EF-A2C View: 40.3 %  EF-Biplane:  43.1 %    LV DIASTOLIC FUNCTION:  Normal Ranges:  MV Peak E: 1.01 m/s (0.7-1.2 m/s)  MV Peak A: 0.67 m/s (0.42-0.7 m/s)  E/A Ratio: 1.51     (1.0-2.2)    MITRAL VALVE:  Normal Ranges:  MV DT: 211 msec (150-240msec)    MITRAL INSUFFICIENCY:  Normal Ranges:  MR Vmax: 346.00 cm/s    AORTIC VALVE:  Normal Ranges:  AoV Vmax:                1.12 m/s (<=1.7m/s)  AoV Peak P.0 mmHg (<20mmHg)  AoV Mean PG:             3.0 mmHg (1.7-11.5mmHg)  LVOT Max Guy:            0.89 m/s (<=1.1m/s)  AoV VTI:                 19.60 cm (18-25cm)  LVOT VTI:                18.00 cm  LVOT Diameter:           2.10 cm  (1.8-2.4cm)  AoV Area, VTI:           3.18 cm2 (2.5-5.5cm2)  AoV Area,Vmax:           2.76 cm2 (2.5-4.5cm2)  AoV Dimensionless Index: 0.92      RIGHT VENTRICLE:  RV Basal 3.42 cm  RV Mid   2.48 cm  RV Major 7.3 cm  TAPSE:   18.0 mm    PULMONIC VALVE:  Normal Ranges:  PV Accel Time: 113 msec (>120ms)  PV Max Guy:    0.8 m/s  (0.6-0.9m/s)  PV Max P.7 mmHg      [id number] Richy Stokes MD  Electronically signed on 2023 at 11:42:12 AM            Stress Testing: No results found for this or any previous visit  "from the past 1825 days.    Cardiac Catheterization: No results found for this or any previous visit from the past 1825 days.  No results found for this or any previous visit from the past 3650 days.         REVIEW OF SYSTEMS  A 10-point system review was completed and was negative except as noted in the HPI.      VITALS  Vitals:    08/06/24 0857   BP: 118/80   Pulse: 110   SpO2: 94%       PHYSICAL EXAM  General: awake, alert and oriented. No acute distress.   Skin: Skin is warm, dry and intact   HEENT: normocephalic, atraumatic; conjunctivae are clear without exudates or hemorrhage. Sclera is non-icteric. Eyelids are normal in appearance without swelling or lesions. Hearing intact. Nares are patent bilaterally. Moist mucous membranes.   Cardiovascular: heart rate is fast, rhythm is sinus. No murmurs, gallops, or rubs are auscultated. S1 and S2 are heard and are of normal intensity. No JVD, no carotid bruits  Respiratory: bilateral lung sounds clear to auscultations without rales, rhonchi, or wheezes. No accessory muscle use or stridor  Gastrointestinal: non-distended, non-tender  Genitourinary: exam deferred  Musculoskeletal: ROM intact, no deformities  Extremities: pulses palpable bilaterally; no swelling or erythema  Neurological: no focal deficits; gait steady  Psychiatric: appropriate mood and affect; good judgment and insight          ASSESSMENT AND PLAN  Assessment/Plan   Diagnoses and all orders for this visit:  Chronic systolic heart failure (Multi)  Palpitations  Presence of drug coated stent in LAD coronary artery  Coronary artery disease due to calcified coronary lesion  Other orders  -Appears \"warm and dry\" from a HF standpoint  -Recent BMP showing normal kidney function  -Need better control with resting HR; will increase Toprol to 150mg daily; patient agreeable. Will call patient in 1 week to see how he is feeling since increasing dosage  -BP is normotensive  -Continue DAPT x 1 year (January, " 2025)  -Will repeat echo in 6 months   -Continue all other medications with no changes      RTC: 6 months       Thank you for allowing me to participate in the care of this patient. Please reach me out if you have any questions or if you need any clarifications regarding the patient's care.    Bhargavi Hannon, BHAVANA, APRN, FNP-C  Division of Cardiovascular Medicine  [facility]  [facility]

## 2024-08-02 ASSESSMENT — ENCOUNTER SYMPTOMS
ENDOCRINE NEGATIVE: 1
DIFFICULTY URINATING: 0
SHORTNESS OF BREATH: 0
CHILLS: 0
ALLERGIC/IMMUNOLOGIC NEGATIVE: 1
NAUSEA: 0
PSYCHIATRIC NEGATIVE: 1
COUGH: 0
EYES NEGATIVE: 1
FEVER: 0

## 2024-08-02 NOTE — PROGRESS NOTES
Subjective   Patient ID: Sd Andino is a 54 y.o. male.    HPI  Patient is here for 4 month follow up with BMP and PSA results. He is taking DDAVP 0.2mg and states its not helping. States is getting up every 2 hours. . Sodium level was 142 on 6/24. Prior Sodium was 140 on 3/24.  Normal cysto on 3/24. He is also taking Uroxatral.. He has failed Oxybutynin and Gemtesa.  Most recent PSA was 0.98 on 6/24. He is also on Testosterone from his family DR , but takes it PRN. ED is mild. No medication for this    Review of Systems   Constitutional:  Negative for chills and fever.   HENT: Negative.     Eyes: Negative.    Respiratory:  Negative for cough and shortness of breath.    Cardiovascular:  Negative for chest pain and leg swelling.   Gastrointestinal:  Negative for nausea.   Endocrine: Negative.    Genitourinary:  Negative for difficulty urinating.        Negative except for documented in HPI   Allergic/Immunologic: Negative.    Neurological:         Alert & oriented X 3   Hematological:         PLAVIX   Psychiatric/Behavioral: Negative.         Objective   Physical Exam  Vitals and nursing note reviewed.   Pulmonary:      Effort: Pulmonary effort is normal.   Abdominal:      Palpations: Abdomen is soft.      Tenderness: There is no abdominal tenderness.   Genitourinary:     Comments: Kidneys non palpable bilaterally  Bladder non palpable or tender  Neurological:      Mental Status: He is alert.         Assessment/Plan   Diagnoses and all orders for this visit:  Nocturia  BPH with obstruction/lower urinary tract symptoms  Urinary frequency      All available PSA values reviewed, Options discussed. Questions answered.   Diet changes for prostate health discussed and educational information given. Pros/Cons of prostate health supplements discussed.   Treatment options for LUTS reviewed  discontinue Uroxatrol  UA and PVR ordered/reviewed  Cialis 5mg Rx given  Discussed timed voiding. Discussed fluid and caffeine  intake  Treatment options for ED reviewed.  Lifestyle change to help prevent UTIs discussed. Encouraged fluid intake.    F/U 4 weeks virtual

## 2024-08-05 ENCOUNTER — APPOINTMENT (OUTPATIENT)
Dept: UROLOGY | Facility: CLINIC | Age: 54
End: 2024-08-05
Payer: COMMERCIAL

## 2024-08-05 ENCOUNTER — TELEPHONE (OUTPATIENT)
Dept: PAIN MEDICINE | Facility: CLINIC | Age: 54
End: 2024-08-05

## 2024-08-05 VITALS — WEIGHT: 195 LBS | HEART RATE: 102 BPM | BODY MASS INDEX: 27.98 KG/M2

## 2024-08-05 DIAGNOSIS — M54.12 CERVICAL RADICULITIS: ICD-10-CM

## 2024-08-05 DIAGNOSIS — R33.9 RETENTION OF URINE: ICD-10-CM

## 2024-08-05 DIAGNOSIS — M47.812 SPONDYLOSIS OF CERVICAL JOINT WITHOUT MYELOPATHY: ICD-10-CM

## 2024-08-05 DIAGNOSIS — N40.1 BPH WITH OBSTRUCTION/LOWER URINARY TRACT SYMPTOMS: ICD-10-CM

## 2024-08-05 DIAGNOSIS — N13.8 BPH WITH OBSTRUCTION/LOWER URINARY TRACT SYMPTOMS: ICD-10-CM

## 2024-08-05 DIAGNOSIS — R35.0 URINARY FREQUENCY: ICD-10-CM

## 2024-08-05 DIAGNOSIS — R35.1 NOCTURIA: ICD-10-CM

## 2024-08-05 LAB
POC APPEARANCE, URINE: CLEAR
POC BILIRUBIN, URINE: NEGATIVE
POC BLOOD, URINE: ABNORMAL
POC COLOR, URINE: YELLOW
POC GLUCOSE, URINE: ABNORMAL MG/DL
POC KETONES, URINE: NEGATIVE MG/DL
POC LEUKOCYTES, URINE: NEGATIVE
POC NITRITE,URINE: NEGATIVE
POC PH, URINE: 5.5 PH
POC PROTEIN, URINE: NEGATIVE MG/DL
POC SPECIFIC GRAVITY, URINE: 1.02
POC UROBILINOGEN, URINE: 0.2 EU/DL

## 2024-08-05 PROCEDURE — 99214 OFFICE O/P EST MOD 30 MIN: CPT | Performed by: UROLOGY

## 2024-08-05 PROCEDURE — 3048F LDL-C <100 MG/DL: CPT | Performed by: UROLOGY

## 2024-08-05 PROCEDURE — 3060F POS MICROALBUMINURIA REV: CPT | Performed by: UROLOGY

## 2024-08-05 PROCEDURE — 81003 URINALYSIS AUTO W/O SCOPE: CPT | Performed by: UROLOGY

## 2024-08-05 RX ORDER — TADALAFIL 5 MG/1
5 TABLET ORAL DAILY
Qty: 30 TABLET | Refills: 11 | Status: SHIPPED | OUTPATIENT
Start: 2024-08-05 | End: 2025-08-05

## 2024-08-06 ENCOUNTER — APPOINTMENT (OUTPATIENT)
Dept: CARDIOLOGY | Facility: CLINIC | Age: 54
End: 2024-08-06
Payer: COMMERCIAL

## 2024-08-06 VITALS
HEIGHT: 70 IN | OXYGEN SATURATION: 94 % | HEART RATE: 110 BPM | BODY MASS INDEX: 28.06 KG/M2 | WEIGHT: 196 LBS | DIASTOLIC BLOOD PRESSURE: 80 MMHG | SYSTOLIC BLOOD PRESSURE: 118 MMHG

## 2024-08-06 DIAGNOSIS — I50.22 CHRONIC SYSTOLIC HEART FAILURE (MULTI): Primary | ICD-10-CM

## 2024-08-06 DIAGNOSIS — R00.2 PALPITATIONS: ICD-10-CM

## 2024-08-06 DIAGNOSIS — I25.10 CORONARY ARTERY DISEASE DUE TO CALCIFIED CORONARY LESION: ICD-10-CM

## 2024-08-06 DIAGNOSIS — Z95.5 PRESENCE OF DRUG COATED STENT IN LAD CORONARY ARTERY: ICD-10-CM

## 2024-08-06 DIAGNOSIS — I25.84 CORONARY ARTERY DISEASE DUE TO CALCIFIED CORONARY LESION: ICD-10-CM

## 2024-08-06 PROCEDURE — 3060F POS MICROALBUMINURIA REV: CPT

## 2024-08-06 PROCEDURE — 93000 ELECTROCARDIOGRAM COMPLETE: CPT

## 2024-08-06 PROCEDURE — 3048F LDL-C <100 MG/DL: CPT

## 2024-08-06 PROCEDURE — 1036F TOBACCO NON-USER: CPT

## 2024-08-06 PROCEDURE — 99214 OFFICE O/P EST MOD 30 MIN: CPT

## 2024-08-06 PROCEDURE — 3074F SYST BP LT 130 MM HG: CPT

## 2024-08-06 PROCEDURE — 3008F BODY MASS INDEX DOCD: CPT

## 2024-08-06 PROCEDURE — 3079F DIAST BP 80-89 MM HG: CPT

## 2024-08-06 RX ORDER — DICLOFENAC SODIUM 10 MG/G
4 GEL TOPICAL 4 TIMES DAILY PRN
Qty: 300 G | Refills: 0 | Status: SHIPPED | OUTPATIENT
Start: 2024-08-06

## 2024-08-06 RX ORDER — METOPROLOL SUCCINATE 50 MG/1
50 TABLET, EXTENDED RELEASE ORAL DAILY
Qty: 90 TABLET | Refills: 3 | Status: SHIPPED | OUTPATIENT
Start: 2024-08-06 | End: 2025-08-06

## 2024-08-06 RX ORDER — OXYCODONE AND ACETAMINOPHEN 5; 325 MG/1; MG/1
1-2 TABLET ORAL DAILY PRN
Qty: 60 TABLET | Refills: 0 | Status: SHIPPED | OUTPATIENT
Start: 2024-08-06

## 2024-08-27 ENCOUNTER — APPOINTMENT (OUTPATIENT)
Dept: PAIN MEDICINE | Facility: CLINIC | Age: 54
End: 2024-08-27
Payer: COMMERCIAL

## 2024-09-06 ASSESSMENT — ENCOUNTER SYMPTOMS
PSYCHIATRIC NEGATIVE: 1
NAUSEA: 0
CHILLS: 0
EYES NEGATIVE: 1
FEVER: 0
DIFFICULTY URINATING: 0
ALLERGIC/IMMUNOLOGIC NEGATIVE: 1
SHORTNESS OF BREATH: 0
COUGH: 0
ENDOCRINE NEGATIVE: 1

## 2024-09-06 NOTE — PROGRESS NOTES
Virtual or Telephone Consent    An interactive audio and video telecommunication system which permits real time communications between the patient (at the originating site) and provider (at the distant site) was utilized to provide this telehealth service.   Verbal consent was requested and obtained from Sd Andino on this date, 09/12/24 for a telehealth visit.       Subjective   Patient ID: Sd Andino is a 54 y.o. male.    HPI  Patient is here for medication check. He was started on Daily Cialis and states it has helped. He is also taking DDAVP. He has failed Uroxatral, oxybutynin, and Gemtesa. Recent Sodium was 142 on 6/24. Normal cysto on 3/24.   Most recent PSA was 0.98 on 6/24. He is also on Testosterone from his family DR , but takes it PRN. ED is mild. No medication for this.. Nocturia x 2, depending on fluid intake.    Review of Systems   Constitutional:  Negative for chills and fever.   HENT: Negative.     Eyes: Negative.    Respiratory:  Negative for cough and shortness of breath.    Cardiovascular:  Negative for chest pain and leg swelling.   Gastrointestinal:  Negative for nausea.   Endocrine: Negative.    Genitourinary:  Negative for difficulty urinating.        Negative except for documented in HPI   Allergic/Immunologic: Negative.    Neurological:         Alert & oriented X 3   Hematological:         Denies blood thinners   Psychiatric/Behavioral: Negative.         Objective   Physical Exam  No PE done given the virtual nature of visit.   Assessment/Plan   There are no diagnoses linked to this encounter.    All available PSA values reviewed, Options discussed. Questions answered.   Diet changes for prostate health discussed and educational information given. Pros/Cons of prostate health supplements discussed.   Treatment options for LUTS reviewed  Cialis Rx given 90 days [employer]  Continue DDAVP  BMP reviewed and re-ordered for 6 months  Discussed timed voiding. Discussed fluid and caffeine  intake  Treatment options for ED reviewed.  Lifestyle change to help prevent UTIs discussed. Encouraged fluid intake.  pros/cons of Testosterone replacement reviewed. Replacement options discussed. Questions answered. Available levels reviewed.  PCP managing this    F/U 6 months with sodium level

## 2024-09-09 ENCOUNTER — APPOINTMENT (OUTPATIENT)
Dept: UROLOGY | Facility: CLINIC | Age: 54
End: 2024-09-09
Payer: COMMERCIAL

## 2024-09-10 ENCOUNTER — TELEPHONE (OUTPATIENT)
Dept: PAIN MEDICINE | Facility: CLINIC | Age: 54
End: 2024-09-10
Payer: COMMERCIAL

## 2024-09-10 DIAGNOSIS — M47.812 SPONDYLOSIS OF CERVICAL JOINT WITHOUT MYELOPATHY: ICD-10-CM

## 2024-09-10 DIAGNOSIS — M54.12 CERVICAL RADICULITIS: ICD-10-CM

## 2024-09-10 RX ORDER — DICLOFENAC SODIUM 10 MG/G
4 GEL TOPICAL 4 TIMES DAILY PRN
Qty: 300 G | Refills: 0 | Status: SHIPPED | OUTPATIENT
Start: 2024-09-10

## 2024-09-10 RX ORDER — OXYCODONE AND ACETAMINOPHEN 5; 325 MG/1; MG/1
1-2 TABLET ORAL DAILY PRN
Qty: 60 TABLET | Refills: 0 | Status: SHIPPED | OUTPATIENT
Start: 2024-09-10

## 2024-09-12 ENCOUNTER — APPOINTMENT (OUTPATIENT)
Dept: UROLOGY | Facility: CLINIC | Age: 54
End: 2024-09-12
Payer: COMMERCIAL

## 2024-09-12 DIAGNOSIS — N40.1 BPH WITH OBSTRUCTION/LOWER URINARY TRACT SYMPTOMS: ICD-10-CM

## 2024-09-12 DIAGNOSIS — R35.0 URINARY FREQUENCY: ICD-10-CM

## 2024-09-12 DIAGNOSIS — R35.1 NOCTURIA: ICD-10-CM

## 2024-09-12 DIAGNOSIS — N13.8 BPH WITH OBSTRUCTION/LOWER URINARY TRACT SYMPTOMS: ICD-10-CM

## 2024-09-12 PROCEDURE — 3048F LDL-C <100 MG/DL: CPT | Performed by: UROLOGY

## 2024-09-12 PROCEDURE — 99214 OFFICE O/P EST MOD 30 MIN: CPT | Performed by: UROLOGY

## 2024-09-12 PROCEDURE — 3060F POS MICROALBUMINURIA REV: CPT | Performed by: UROLOGY

## 2024-09-12 RX ORDER — TADALAFIL 5 MG/1
5 TABLET ORAL DAILY
Qty: 90 TABLET | Refills: 3 | Status: SHIPPED | OUTPATIENT
Start: 2024-09-12 | End: 2025-09-12

## 2024-10-10 ENCOUNTER — TELEPHONE (OUTPATIENT)
Dept: PAIN MEDICINE | Facility: CLINIC | Age: 54
End: 2024-10-10
Payer: COMMERCIAL

## 2024-10-10 DIAGNOSIS — M47.812 SPONDYLOSIS OF CERVICAL JOINT WITHOUT MYELOPATHY: ICD-10-CM

## 2024-10-10 DIAGNOSIS — M54.12 CERVICAL RADICULITIS: ICD-10-CM

## 2024-10-10 RX ORDER — OXYCODONE AND ACETAMINOPHEN 5; 325 MG/1; MG/1
1-2 TABLET ORAL DAILY PRN
Qty: 60 TABLET | Refills: 0 | Status: SHIPPED | OUTPATIENT
Start: 2024-10-10

## 2024-10-17 NOTE — PROGRESS NOTES
CHIEF COMPLAINT  Cardiac risk stratification     HISTORY OF PRESENT ILLNESS    Patient presents for cardiac risk stratification prior to upper GI scope in December. Currently, he reports his BP and HR have been elevated for the past 3 weeks. He also reports intermittent left-sided chest pressure. He currently works for a [employer] company three days a week and walks approximately 11 miles on those days through many terrains. He reports no change in his exercise tolerance. He also reports bilateral ear and teeth pain and is scheduled to see an ENT on Monday. He routinely checks his BP at home with systolic between 140-160 and diastolic in the high 90s the past few weeks. He reports medication compliance. He does suffer from chronic knee pain and reports going to bed with a circulating ice machine and TENS unit on his left knee.     Cardiovascular hx:    ACC/AHA Stage C HFrEF, ischemic (LVSF 35%)  -NYHA Class II  - Patient presented to [facility] ED on 1/15/24 in the setting of chest pain. Patient underwent a LHC with Dr. Villarreal on 1/16/24 showing 90% stenosis. Successful PCI to distal LAD with 1 VALENTIN  -DAPT x 1 year and then stop plavix and continue ASA indefinitely   -Compliant with high-intensity statin; no adverse effects reported  -GDMT includes Jardiance 10mg daily, Toprol XL 125mg daily, high-intensity Entresto, Spironolactone 12.5mg daily   -BP goal <130/80  -Recent BMP (June, 2024) showing normal kidney function          Cardiovascular testing:  Echo (April, 2024)-LVSF is moderately decreased with an EF of 35-40%; reduced RVSF  2. Event monitor (August, 2023)-14% burden of premature atrial contractions but no sustained tachyarrhythmias        Past Medical, Surgical, and Family History reviewed and updated in chart.     Reviewed all medications by prescribing practitioner or clinical pharmacist (such as prescriptions, OTCs, herbal therapies and supplements) and documented in the medical record.    Past  Medical History  Past Medical History:   Diagnosis Date    Ejection fraction < 50% 10/31/2023    Personal history of other diseases of the circulatory system 12/02/2019    History of essential hypertension    Type 2 diabetes mellitus without complications (Multi)     Type 2 diabetes mellitus without complication, without long-term current use of insulin       Social History  Social History     Tobacco Use    Smoking status: Never    Smokeless tobacco: Never   Substance Use Topics    Alcohol use: Yes     Comment: 10 beers yearly; occasionally    Drug use: Never       Family History     Family History   Problem Relation Name Age of Onset    Hypertension Mother      Hypertension Father      Heart attack Other GRANDPARENT     Stroke Other GRANDPARENT         Allergies:  No Known Allergies     Outpatient Medications:  Current Outpatient Medications   Medication Instructions    alfuzosin (UROXATRAL) 10 mg, oral, Daily, Do not crush, chew, or split.    ascorbic acid (VITAMIN C) 500 mg, Daily    aspirin 81 mg, Daily    atorvastatin (LIPITOR) 40 mg, oral, Nightly    blood sugar diagnostic (Accu-Chek Alicia Plus test strp) strip Accu-Chek Alicia Plus In Vitro Strip   Refills: 0        Start : 15-Nov-2017   Active    [START ON 11/17/2024] carvedilol (COREG) 25 mg, oral, 2 times daily (morning and late afternoon)    carvedilol (COREG) 25 mg, oral, 2 times daily (morning and late afternoon)    cholecalciferol (VITAMIN D-3) 50 mcg    clopidogrel (PLAVIX) 75 mg, oral, Daily    desmopressin (DDAVP) 0.2 mg, oral, Nightly    diclofenac sodium (VOLTAREN) 4 g, Topical, 4 times daily PRN    empagliflozin (JARDIANCE) 10 mg, oral, Daily    FreeStyle Romi sensor system (FreeStyle Romi 2 Sensor) kit 1 Device    glucosamine sulfate 500 mg, 2 times daily    LANCETS-BLOOD GLUCOSE STRIPS MISC Use to check blood sugar 4 times daily    metFORMIN (Glucophage) 1,000 mg tablet 1 tablet, Every 12 hours    montelukast (Singulair) 10 mg tablet 1  "tablet, Once as needed    multivit-minerals/folic/ginkgo (DAILY MULTIPLE ORAL) 1 tablet, Daily    omega3/dha/epa/fish oil/vit D3 (OMEGA-3 PLUS VITAMIN D3 ORAL) 1 capsule, 2 times daily    OneTouch Verio Flex meter misc USE AS DIRECTED.    oxyCODONE-acetaminophen (Percocet) 5-325 mg tablet 1-2 tablets, oral, Daily PRN    Ozempic 1 mg, Once Weekly    pantoprazole (PROTONIX) 40 mg, Daily    sacubitriL-valsartan (Entresto)  mg tablet One tablet twice daily    spironolactone (ALDACTONE) 12.5 mg, oral, Daily    syr,ndl,ins,safe 0.5mL,disp un (INSULIN SYRINGE-NEEDLE,DISPOS. MISC) USE ONE SYRINGE ONCE A WEEK    syringe with needle 3 mL 25 gauge x 1\" syringe USE ONE SYRINGE ONCE A WEEK    tadalafil (CIALIS) 5 mg, oral, Daily    tadalafil 20 mg tablet 1 tablet, As needed    testosterone cypionate (DEPO-TESTOSTERONE) 200 mg, Once as needed    zinc gluconate 50 mg tablet 1 tablet, Daily    zolpidem (Ambien) 10 mg tablet 1 tablet, Nightly PRN          Labs:   CMP:  Recent Labs     06/04/24  1311 03/09/24  1004 01/17/24  0522 11/28/23  1148 09/29/23  1150 02/12/23  0534 02/11/23  2252 02/10/23  0636    140 138 139 140   < > 134* 136   K 4.4 4.2 3.6 3.8 3.9   < > 4.2 4.3    106 104 106 105   < > 100 100   CO2 29 25 26 25 27   < > 24 22   ANIONGAP 12 13 12 12 12   < > 14 18   BUN 19 21 13 24* 18   < > 26* 26*   CREATININE 1.08 1.04 1.05 0.99 1.05   < > 1.11 1.02   EGFR 83 87 86 >90  --   --   --   --    MG  --   --   --   --   --   --  2.04 1.64    < > = values in this interval not displayed.     Recent Labs     01/17/24  0522 06/21/23  1504 02/11/23  2252 02/10/23  0636 02/09/23  1401   ALBUMIN 4.2 4.6 4.1 4.6 4.5   ALKPHOS 104 121* 79 88 112   ALT 14 28 25 21 22   AST 14 20 20 21 16   BILITOT 0.5 0.6 0.5 0.6 0.7     CBC:  Recent Labs     01/17/24  0522 08/29/23  1710 06/21/23  1504 02/12/23  0534 02/11/23  2252   WBC 8.7 10.1 9.3 10.8 15.4*   HGB 16.3 16.4 14.9 13.0* 14.6   HCT 49.3 47.6 42.8 39.4* 43.0   PLT " "312 360 384 336 396   MCV 91 92 90 95 94     COAG:   Recent Labs     02/10/23  0636 10/11/21  1644 09/08/21  1614 09/07/21  2111   INR 1.1  --   --  1.3*   DDIMERVTE  --  798* <215 284     ABO: No results for input(s): \"ABO\" in the last 63524 hours.  HEME/ENDO:  Recent Labs     06/21/23  1504 02/11/23  2252 04/12/22  1215 09/19/21  0508 09/08/21  1059 09/07/21  2050 06/23/20  1210   FERRITIN  --   --   --   --  3,610* 2,046*  --    TSH  --  0.38* 2.83  --   --   --   --    HGBA1C 7.2*  --  7.2* 8.2*  --   --  8.1      CARDIAC:   Recent Labs     08/29/23  1907 08/29/23  1710 02/11/23  2252   TROPHS 22* 24* 7   BNP  --  279*  --      Recent Labs     01/17/24  0522 04/12/22  1215 09/14/21  0531 06/23/20  1210 11/25/19  0852   CHOL 124 163  --  152 161   LDLF  --  102*  --  90 93   HDL 34.0 34.0*  --  37.0* 36.0*   TRIG 121 136 894* 123 158*     MICRO:   Recent Labs     09/16/21  0546 09/11/21  0428 09/08/21  1614 09/08/21  1059   CRP 1.14* 10.71*  --  30.85*   PROCAL  --   --  1.16*  --      No results found for the last 90 days.    Notable Studies: imaging personally reviewed   EKG:No results found for this or any previous visit (from the past 4464 hours).  Echocardiogram:   [place] [address]  Phone [phone], Fax [phone]    TRANSTHORACIC ECHOCARDIOGRAM REPORT      Patient Name:     MAURICIO ROB WILDE Reading Physician:   [id number] Stokes MD  Study Date:       9/8/2023       Referring Physician: Michelle Stokes MD  MRN/PID:          [id number]       PCP:  Accession/Order#: [id number]      Department Location: [facility]  YOB: 1970      Fellow:  Gender:           M              Nurse:  Admit Date:                      Sonographer:         BRENDA Lawrence RVT  Admission Status: Outpatient     Additional Staff:  Height:           177.80 cm      CC Report to:  Weight:           95.26 kg       Study Type: "          Echocardiogram  BSA:              2.13 m2  Blood Pressure: 136 /86 mmHg    Diagnosis/ICD: R06.02-Shortness of breath; R79.89-Elevated Troponin  Indication:    Dyspnea  Procedure/CPT: Echo Complete w Full Doppler-[id number]    Patient History:  Pertinent History: No previous echo.    Study Detail: The following Echo studies were performed: M-Mode, 2D, Doppler and  color flow. Definity used as a contrast agent for endocardial  border definition. Total contrast used for this procedure was 1 mL  via IV push. A bubble study was not performed. The patient was  awake.      PHYSICIAN INTERPRETATION:  Left Ventricle: Left ventricular systolic function is moderately decreased, with an estimated ejection fraction of 35-40%. There is global hypokinesis of the left ventricle with minor regional variations. The left ventricular cavity size is normal. Left ventricular diastolic filling was indeterminate.  Left Atrium: The left atrium is normal in size.  Right Ventricle: The right ventricle is normal in size. There is normal right ventricular global systolic function.  Right Atrium: The right atrium is normal in size.  Aortic Valve: The aortic valve is probably trileaflet. There is no evidence of aortic valve regurgitation. The peak instantaneous gradient of the aortic valve is 5.0 mmHg. The mean gradient of the aortic valve is 3.0 mmHg.  Mitral Valve: The mitral valve is normal in structure. There is mild mitral valve regurgitation.  Tricuspid Valve: The tricuspid valve was not well visualized. No evidence of tricuspid regurgitation.  Pulmonic Valve: The pulmonic valve is not well visualized. There is no indication of pulmonic valve regurgitation.  Pericardium: There is no pericardial effusion noted.  Aorta: The aortic root is normal.  Systemic Veins: The inferior vena cava appears to be of normal size. There is IVC inspiratory collapse greater than 50%.      CONCLUSIONS:  1. Left ventricular systolic function is moderately  decreased with a 35-40% estimated ejection fraction.  2. There is global hypokinesis of the left ventricle with minor regional variations.    QUANTITATIVE DATA SUMMARY:  2D MEASUREMENTS:  Normal Ranges:  Ao Root d:     3.10 cm    (2.0-3.7cm)  LAs:           3.00 cm    (2.7-4.0cm)  IVSd:          1.00 cm    (0.6-1.1cm)  LVPWd:         1.23 cm    (0.6-1.1cm)  LVIDd:         5.10 cm    (3.9-5.9cm)  LVIDs:         4.15 cm  LV Mass Index: 102.3 g/m2  LV % FS        18.6 %    LA VOLUME:  Normal Ranges:  LA Vol A4C:        22.8 ml    (22+/-6mL/m2)  LA Vol A2C:        68.8 ml  LA Vol BP:         43.0 ml  LA Vol Index A4C:  10.7ml/m2  LA Vol Index A2C:  32.3 ml/m2  LA Vol Index BP:   20.2 ml/m2  LA Area A4C:       11.2 cm2  LA Area A2C:       21.1 cm2  LA Major Axis A4C: 4.7 cm  LA Major Axis A2C: 5.5 cm  LA Volume Index:   31.3 ml/m2  LA Vol A4C:        22.5 ml  LA Vol A2C:        66.6 ml    M-MODE MEASUREMENTS:  Normal Ranges:  AoV Exc: 1.70 cm (1.5-2.5cm)    AORTA MEASUREMENTS:  Normal Ranges:  AoV Exc: 1.70 cm (1.5-2.5cm)    LV SYSTOLIC FUNCTION BY 2D PLANIMETRY (MOD):  Normal Ranges:  EF-A4C View: 47.2 % (>=55%)  EF-A2C View: 40.3 %  EF-Biplane:  43.1 %    LV DIASTOLIC FUNCTION:  Normal Ranges:  MV Peak E: 1.01 m/s (0.7-1.2 m/s)  MV Peak A: 0.67 m/s (0.42-0.7 m/s)  E/A Ratio: 1.51     (1.0-2.2)    MITRAL VALVE:  Normal Ranges:  MV DT: 211 msec (150-240msec)    MITRAL INSUFFICIENCY:  Normal Ranges:  MR Vmax: 346.00 cm/s    AORTIC VALVE:  Normal Ranges:  AoV Vmax:                1.12 m/s (<=1.7m/s)  AoV Peak P.0 mmHg (<20mmHg)  AoV Mean PG:             3.0 mmHg (1.7-11.5mmHg)  LVOT Max Guy:            0.89 m/s (<=1.1m/s)  AoV VTI:                 19.60 cm (18-25cm)  LVOT VTI:                18.00 cm  LVOT Diameter:           2.10 cm  (1.8-2.4cm)  AoV Area, VTI:           3.18 cm2 (2.5-5.5cm2)  AoV Area,Vmax:           2.76 cm2 (2.5-4.5cm2)  AoV Dimensionless Index: 0.92      RIGHT VENTRICLE:  RV Basal 3.42  cm  RV Mid   2.48 cm  RV Major 7.3 cm  TAPSE:   18.0 mm    PULMONIC VALVE:  Normal Ranges:  PV Accel Time: 113 msec (>120ms)  PV Max Guy:    0.8 m/s  (0.6-0.9m/s)  PV Max P.7 mmHg      [id number] Richy Stokes MD  Electronically signed on 2023 at 11:42:12 AM      Stress Testing: No results found for this or any previous visit from the past 1825 days.    Cardiac Catheterization: No results found for this or any previous visit from the past 1825 days.  No results found for this or any previous visit from the past 3650 days.         REVIEW OF SYSTEMS  A 10-point system review was completed and was negative except as noted in the HPI.      VITALS  Vitals:    10/18/24 1139   BP: 130/88   Pulse: 98   SpO2: 97%       PHYSICAL EXAM  General: awake, alert and oriented. No acute distress.   Skin: Skin is warm, dry and intact without rashes or lesions.  HEENT: normocephalic, atraumatic; conjunctivae are clear without exudates or hemorrhage. Sclera is non-icteric. Eyelids are normal in appearance without swelling or lesions. Hearing intact. Nares are patent bilaterally. Moist mucous membranes.   Cardiovascular: heart rate and rhythm are normal. No murmurs, gallops, or rubs are auscultated. S1 and S2 are heard and are of normal intensity. No JVD, no carotid bruits  Respiratory: bilateral lung sounds clear to auscultations without rales, rhonchi, or wheezes. No accessory muscle use or stridor  Musculoskeletal: ROM intact, no deformities  Extremities:  no swelling or erythema  Neurological: no focal deficits; gait steady  Psychiatric: appropriate mood and affect; good judgment and insight          ASSESSMENT AND PLAN  Assessment/Plan   Diagnoses and all orders for this visit:  Ear pain, bilateral  Chronic systolic heart failure  Primary hypertension  Presence of drug coated stent in LAD coronary artery  Coronary artery disease due to calcified coronary lesion  Tooth pain  -RCRI: Patient is a Class II risk indicating his  30 day risk of cardiac arrest, MI, or death is 6.0%. Patient may proceed with upcoming procedure without any further cardiac testing  -Will obtain CBC and CMP to evaluate for any underlying kidney dysfunction or infection given current symptoms of ear and teeth pain as well as being on his HF medications  -BP not at goal. Will stop metoprolol and start carvedilol 25mg twice daily for better BP and HR control. Will call the patient in 1 week to evaluate med effectiveness  -Will increase spironolactone to 25mg daily  -Continue all other GDMT medications  -Continue DAPT x 1 year (January, 2025) and then ASA 81mg indefinitely   -Echo scheduled to be repeated in February       RTC: February       Thank you for allowing me to participate in the care of this patient. Please reach me out if you have any questions or if you need any clarifications regarding the patient's care.    Bhargavi Hannon, BHAVANA, APRN, FNP-C  Division of Cardiovascular Medicine  [facility]  [facility]

## 2024-10-18 ENCOUNTER — LAB (OUTPATIENT)
Dept: LAB | Facility: LAB | Age: 54
End: 2024-10-18
Payer: COMMERCIAL

## 2024-10-18 ENCOUNTER — OFFICE VISIT (OUTPATIENT)
Dept: CARDIOLOGY | Facility: CLINIC | Age: 54
End: 2024-10-18
Payer: COMMERCIAL

## 2024-10-18 VITALS
HEIGHT: 71 IN | DIASTOLIC BLOOD PRESSURE: 88 MMHG | WEIGHT: 208.2 LBS | SYSTOLIC BLOOD PRESSURE: 130 MMHG | BODY MASS INDEX: 29.15 KG/M2 | HEART RATE: 98 BPM | OXYGEN SATURATION: 97 %

## 2024-10-18 DIAGNOSIS — K08.89 TOOTH PAIN: ICD-10-CM

## 2024-10-18 DIAGNOSIS — R35.1 NOCTURIA: ICD-10-CM

## 2024-10-18 DIAGNOSIS — I50.22 CHRONIC SYSTOLIC HEART FAILURE: ICD-10-CM

## 2024-10-18 DIAGNOSIS — I10 PRIMARY HYPERTENSION: ICD-10-CM

## 2024-10-18 DIAGNOSIS — I25.10 CORONARY ARTERY DISEASE DUE TO CALCIFIED CORONARY LESION: ICD-10-CM

## 2024-10-18 DIAGNOSIS — H92.03 EAR PAIN, BILATERAL: Primary | ICD-10-CM

## 2024-10-18 DIAGNOSIS — Z95.5 PRESENCE OF DRUG COATED STENT IN LAD CORONARY ARTERY: ICD-10-CM

## 2024-10-18 DIAGNOSIS — I25.84 CORONARY ARTERY DISEASE DUE TO CALCIFIED CORONARY LESION: ICD-10-CM

## 2024-10-18 DIAGNOSIS — R93.1 DECREASED CARDIAC EJECTION FRACTION: ICD-10-CM

## 2024-10-18 DIAGNOSIS — H92.03 EAR PAIN, BILATERAL: ICD-10-CM

## 2024-10-18 PROBLEM — R00.2 PALPITATIONS: Status: RESOLVED | Noted: 2024-08-06 | Resolved: 2024-10-18

## 2024-10-18 PROBLEM — R74.8 HIGH LEVEL OF CARDIAC MARKER: Status: RESOLVED | Noted: 2023-10-11 | Resolved: 2024-10-18

## 2024-10-18 PROBLEM — R07.9 CHEST PAIN: Status: RESOLVED | Noted: 2024-01-15 | Resolved: 2024-10-18

## 2024-10-18 LAB
ALBUMIN SERPL BCP-MCNC: 4.7 G/DL (ref 3.4–5)
ALP SERPL-CCNC: 123 U/L (ref 33–120)
ALT SERPL W P-5'-P-CCNC: 21 U/L (ref 10–52)
ANION GAP SERPL CALC-SCNC: 14 MMOL/L (ref 10–20)
AST SERPL W P-5'-P-CCNC: 18 U/L (ref 9–39)
BASOPHILS # BLD AUTO: 0.05 X10*3/UL (ref 0–0.1)
BASOPHILS NFR BLD AUTO: 0.7 %
BILIRUB SERPL-MCNC: 0.7 MG/DL (ref 0–1.2)
BUN SERPL-MCNC: 17 MG/DL (ref 6–23)
CALCIUM SERPL-MCNC: 9.4 MG/DL (ref 8.6–10.3)
CHLORIDE SERPL-SCNC: 105 MMOL/L (ref 98–107)
CO2 SERPL-SCNC: 27 MMOL/L (ref 21–32)
CREAT SERPL-MCNC: 1.04 MG/DL (ref 0.5–1.3)
EGFRCR SERPLBLD CKD-EPI 2021: 86 ML/MIN/1.73M*2
EOSINOPHIL # BLD AUTO: 0.26 X10*3/UL (ref 0–0.7)
EOSINOPHIL NFR BLD AUTO: 3.7 %
ERYTHROCYTE [DISTWIDTH] IN BLOOD BY AUTOMATED COUNT: 11.9 % (ref 11.5–14.5)
GLUCOSE SERPL-MCNC: 134 MG/DL (ref 74–99)
HCT VFR BLD AUTO: 45.7 % (ref 41–52)
HGB BLD-MCNC: 15.7 G/DL (ref 13.5–17.5)
IMM GRANULOCYTES # BLD AUTO: 0.06 X10*3/UL (ref 0–0.7)
IMM GRANULOCYTES NFR BLD AUTO: 0.9 % (ref 0–0.9)
LYMPHOCYTES # BLD AUTO: 1.23 X10*3/UL (ref 1.2–4.8)
LYMPHOCYTES NFR BLD AUTO: 17.7 %
MCH RBC QN AUTO: 31.1 PG (ref 26–34)
MCHC RBC AUTO-ENTMCNC: 34.4 G/DL (ref 32–36)
MCV RBC AUTO: 91 FL (ref 80–100)
MONOCYTES # BLD AUTO: 0.42 X10*3/UL (ref 0.1–1)
MONOCYTES NFR BLD AUTO: 6.1 %
NEUTROPHILS # BLD AUTO: 4.92 X10*3/UL (ref 1.2–7.7)
NEUTROPHILS NFR BLD AUTO: 70.9 %
NRBC BLD-RTO: 0 /100 WBCS (ref 0–0)
PLATELET # BLD AUTO: 297 X10*3/UL (ref 150–450)
POTASSIUM SERPL-SCNC: 4.2 MMOL/L (ref 3.5–5.3)
PROT SERPL-MCNC: 6.5 G/DL (ref 6.4–8.2)
RBC # BLD AUTO: 5.05 X10*6/UL (ref 4.5–5.9)
SODIUM SERPL-SCNC: 142 MMOL/L (ref 136–145)
WBC # BLD AUTO: 6.9 X10*3/UL (ref 4.4–11.3)

## 2024-10-18 PROCEDURE — 3075F SYST BP GE 130 - 139MM HG: CPT

## 2024-10-18 PROCEDURE — 3008F BODY MASS INDEX DOCD: CPT

## 2024-10-18 PROCEDURE — 3079F DIAST BP 80-89 MM HG: CPT

## 2024-10-18 PROCEDURE — 99214 OFFICE O/P EST MOD 30 MIN: CPT

## 2024-10-18 PROCEDURE — 93000 ELECTROCARDIOGRAM COMPLETE: CPT

## 2024-10-18 PROCEDURE — 1036F TOBACCO NON-USER: CPT

## 2024-10-18 PROCEDURE — 80053 COMPREHEN METABOLIC PANEL: CPT

## 2024-10-18 PROCEDURE — 36415 COLL VENOUS BLD VENIPUNCTURE: CPT

## 2024-10-18 PROCEDURE — 85025 COMPLETE CBC W/AUTO DIFF WBC: CPT

## 2024-10-18 PROCEDURE — 3048F LDL-C <100 MG/DL: CPT

## 2024-10-18 PROCEDURE — 3060F POS MICROALBUMINURIA REV: CPT

## 2024-10-18 RX ORDER — CARVEDILOL 25 MG/1
25 TABLET ORAL
Qty: 60 TABLET | Refills: 11 | Status: SHIPPED | OUTPATIENT
Start: 2024-10-18 | End: 2025-10-18

## 2024-10-18 RX ORDER — CARVEDILOL 25 MG/1
25 TABLET ORAL
Qty: 180 TABLET | Refills: 3 | Status: SHIPPED | OUTPATIENT
Start: 2024-11-17 | End: 2025-11-17

## 2024-10-18 RX ORDER — CARVEDILOL 25 MG/1
25 TABLET ORAL
Qty: 60 TABLET | Refills: 11 | Status: SHIPPED | OUTPATIENT
Start: 2024-10-18 | End: 2024-10-18

## 2024-10-18 NOTE — PATIENT INSTRUCTIONS
Increase spironolactone to 25mg daily  Stop metoprolol  Carvedilol 25 mg twice daily   Obtain CBC and CMP

## 2024-10-21 ENCOUNTER — TELEPHONE (OUTPATIENT)
Dept: CARDIOLOGY | Facility: CLINIC | Age: 54
End: 2024-10-21
Payer: COMMERCIAL

## 2024-10-21 NOTE — TELEPHONE ENCOUNTER
Maia from [facility] called office inquiring if there are any special instructions to hold Plavix prior to upcoming surgery and if pt is ok to have anesthesia. Please advise.

## 2024-10-22 ENCOUNTER — TELEPHONE (OUTPATIENT)
Dept: CARDIOLOGY | Facility: CLINIC | Age: 54
End: 2024-10-22

## 2024-10-22 ENCOUNTER — OFFICE VISIT (OUTPATIENT)
Dept: PAIN MEDICINE | Facility: CLINIC | Age: 54
End: 2024-10-22
Payer: COMMERCIAL

## 2024-10-22 VITALS
WEIGHT: 201 LBS | BODY MASS INDEX: 28.03 KG/M2 | HEART RATE: 109 BPM | SYSTOLIC BLOOD PRESSURE: 124 MMHG | DIASTOLIC BLOOD PRESSURE: 92 MMHG | RESPIRATION RATE: 16 BRPM

## 2024-10-22 DIAGNOSIS — M17.12 OSTEOARTHRITIS OF LEFT KNEE, UNSPECIFIED OSTEOARTHRITIS TYPE: Primary | ICD-10-CM

## 2024-10-22 PROCEDURE — 99213 OFFICE O/P EST LOW 20 MIN: CPT

## 2024-10-22 ASSESSMENT — ENCOUNTER SYMPTOMS
CONSTITUTIONAL NEGATIVE: 1
FACIAL ASYMMETRY: 0
MYALGIAS: 1
COUGH: 0
LIGHT-HEADEDNESS: 0
SHORTNESS OF BREATH: 0
ARTHRALGIAS: 0
PALPITATIONS: 0
DYSPHORIC MOOD: 0
WEAKNESS: 0
BRUISES/BLEEDS EASILY: 0
WHEEZING: 0
ALLERGIC/IMMUNOLOGIC NEGATIVE: 1
ADENOPATHY: 0
EYES NEGATIVE: 1
ENDOCRINE NEGATIVE: 1

## 2024-10-22 NOTE — TELEPHONE ENCOUNTER
----- Message from Nurse Cassia CARDENAS sent at 10/21/2024  9:31 AM EDT -----  Called pt and left message.  ----- Message -----  From: KEVIN Crook-CNP, DNP  Sent: 10/18/2024   4:16 PM EDT  To: Do [facility] Clinical Support Staff    Please let Sd know he has no active infection. His one liver enzyme is slightly elevated but nothing to be concerned about at this time.

## 2024-10-22 NOTE — PROGRESS NOTES
Subjective   Patient ID: Sd Andino is a 54 y.o. male who presents for Pain (FUV for L knee pain. Pain score 4/10 today. Pain is described as a throb/sharp/constant ache. Daily activity will cause or increase pain. Ice, tens unit, massage will help relieve the pain. ZONIA = 34/100. Patient states the L knee injection 7-16-24 was effective. ).   Cherry Covington RN 10/22/24 10:14 AM     The patient is a 54-year-old male who presents today for a 3-month follow-up appointment.  At his most recent appointment back in July, he had a left knee intra-articular steroid injection.  He says he got significant relief from this procedure around 75 to 80% pain reduction that lasted for 3 months.  He said typically his relief from that will decrease a little bit and then abruptly drop off with his full pain level returning.  He currently rates the pain 4/10 in his left knee.  At his most recent visit we continued him on Percocet 5-325 mg twice a day as needed.  He denies side effects to this medication, and says it helps take the edge off of his pain and improve his level of functioning.  He says he has been getting steroid injections in his knee for the past 15 years or so.  He is needing a replacement, but back in January 2024 he had stents placed in his heart and has to be on blood thinners for a year following it.  In the meantime, we are continuing with medication management and repeating the left knee injections when able to.        Review of Systems   Constitutional: Negative.    HENT: Negative.     Eyes: Negative.    Respiratory:  Negative for cough, shortness of breath and wheezing.    Cardiovascular:  Negative for chest pain, palpitations and leg swelling.   Endocrine: Negative.    Genitourinary: Negative.    Musculoskeletal:  Positive for myalgias. Negative for arthralgias.        Left knee pain   Skin: Negative.    Allergic/Immunologic: Negative.    Neurological:  Negative for facial asymmetry, weakness and  light-headedness.   Hematological:  Negative for adenopathy. Does not bruise/bleed easily.   Psychiatric/Behavioral:  Negative for dysphoric mood and suicidal ideas.        Objective   Physical Exam  Constitutional:       General: He is not in acute distress.     Appearance: Normal appearance.   HENT:      Head: Normocephalic.      Mouth/Throat:      Mouth: Mucous membranes are moist.   Eyes:      Extraocular Movements: Extraocular movements intact.   Cardiovascular:      Rate and Rhythm: Normal rate and regular rhythm.      Pulses: Normal pulses.      Heart sounds: Normal heart sounds. No murmur heard.     No friction rub. No gallop.   Pulmonary:      Effort: Pulmonary effort is normal.      Breath sounds: Normal breath sounds. No wheezing, rhonchi or rales.   Abdominal:      General: Abdomen is flat.      Palpations: Abdomen is soft.   Musculoskeletal:      Cervical back: Normal range of motion.      Right lower leg: No edema.      Left lower leg: No edema.      Comments: Left knee surgical scar present  Painful flexion extension of left knee  Tenderness to palpation of left knee   Lymphadenopathy:      Cervical: No cervical adenopathy.   Skin:     General: Skin is warm and dry.   Neurological:      General: No focal deficit present.      Mental Status: He is alert and oriented to person, place, and time. Mental status is at baseline.   Psychiatric:         Mood and Affect: Mood normal.         Behavior: Behavior normal.         Assessment/Plan   Diagnoses and all orders for this visit:  Osteoarthritis of left knee, unspecified osteoarthritis type       The patient is a 54-year-old male with a past medical history significant for left knee arthritis after a traumatic injury while working as a .  He currently rates his pain a 4/10 in that left knee, saying the pain can vary in intensity, but that it interferes with his day-to-day life and his ability to function.  He has been receiving left knee  steroid injections over the past several years, saying they provide-significant relief of 75 to 80% that lasted for around 3 months before the pain returns and full.  He is needing a knee replacement, but given that he had stents placed in January of this year, he is needing to be on blood thinners for a full year before he could be considered for surgery.  His last knee injection was 7/16/2024, and provided significant relief for 3 months before the pain returned.  At this time he is wanting to repeat the left knee intra-articular steroid injection.  Procedure was discussed, risks and benefits were discussed, patient is agreeable.  He will follow-up after the injection for reevaluation, call the clinic sooner if needed.

## 2024-10-25 ENCOUNTER — TELEPHONE (OUTPATIENT)
Dept: CARDIOLOGY | Facility: CLINIC | Age: 54
End: 2024-10-25
Payer: COMMERCIAL

## 2024-10-25 DIAGNOSIS — I50.22 CHRONIC SYSTOLIC HEART FAILURE: ICD-10-CM

## 2024-10-25 DIAGNOSIS — I47.11 INAPPROPRIATE SINUS TACHYCARDIA (CMS-HCC): Primary | ICD-10-CM

## 2024-10-25 RX ORDER — SPIRONOLACTONE 25 MG/1
25 TABLET ORAL DAILY
Qty: 90 TABLET | Refills: 3 | Status: SHIPPED | OUTPATIENT
Start: 2024-10-25 | End: 2025-10-25

## 2024-10-25 RX ORDER — IVABRADINE 5 MG/1
2.5 TABLET, FILM COATED ORAL 2 TIMES DAILY
Qty: 30 TABLET | Refills: 11 | Status: SHIPPED | OUTPATIENT
Start: 2024-10-25 | End: 2025-10-25

## 2024-10-25 RX ORDER — SPIRONOLACTONE 25 MG/1
25 TABLET ORAL DAILY
Qty: 90 TABLET | Refills: 3 | Status: SHIPPED | OUTPATIENT
Start: 2024-10-25 | End: 2024-10-25 | Stop reason: SDUPTHER

## 2024-10-25 NOTE — TELEPHONE ENCOUNTER
----- Message from Michelle MELENDEZ sent at 10/25/2024 11:37 AM EDT -----  PT NOTIFIED  ----- Message -----  From: Sarah E Markley, LPN  Sent: 10/25/2024  11:32 AM EDT  To: Do Nicki Mcnamara Clinical Support Staff    Called pt and left message.  ----- Message -----  From: KEVIN Crook-CNP, BHAVANA  Sent: 10/25/2024   9:40 AM EDT  To: [place] [id number] Clinical Support Staff    Due to resting heart rate still not at goal, I recommend starting a medication called Ivabradine (Corlanor) that works directly on the SA node which is where the heart beat originates from. I would start you on the lowest dose (2.5mg twice daily) and increase as needed for resting HR goal around 70-80 bpm.

## 2024-10-27 DIAGNOSIS — I50.22 CHRONIC SYSTOLIC HEART FAILURE: ICD-10-CM

## 2024-10-28 RX ORDER — SACUBITRIL AND VALSARTAN 97; 103 MG/1; MG/1
TABLET, FILM COATED ORAL
Qty: 180 TABLET | Refills: 1 | Status: SHIPPED | OUTPATIENT
Start: 2024-10-28

## 2024-11-01 ENCOUNTER — TELEPHONE (OUTPATIENT)
Dept: CARDIOLOGY | Facility: CLINIC | Age: 54
End: 2024-11-01
Payer: COMMERCIAL

## 2024-11-01 DIAGNOSIS — R07.9 CHEST PAIN: ICD-10-CM

## 2024-11-01 NOTE — TELEPHONE ENCOUNTER
Pt returns call and heart rate has been fluctuating from  with an average of 100. Just got new med this week from the mail. Will continue to take meds.    ----- Message from Nurse Cassia CARDENAS sent at 11/1/2024  9:22 AM EDT -----  Called pt and left message.  ----- Message -----  From: KEVIN Crook-CNP, DNP  Sent: 11/1/2024   8:00 AM EDT  To: Do [facility] Clinical Support Staff    Please call patient and report back how his heart rates have been running. I am off on Friday but will address the message on Monday.

## 2024-11-05 ENCOUNTER — DOCUMENTATION (OUTPATIENT)
Dept: CARDIOLOGY | Facility: CLINIC | Age: 54
End: 2024-11-05
Payer: COMMERCIAL

## 2024-11-05 DIAGNOSIS — I50.22 CHRONIC SYSTOLIC HEART FAILURE: ICD-10-CM

## 2024-11-05 RX ORDER — ATORVASTATIN CALCIUM 40 MG/1
40 TABLET, FILM COATED ORAL NIGHTLY
Qty: 90 TABLET | Refills: 1 | Status: SHIPPED | OUTPATIENT
Start: 2024-11-05

## 2024-11-05 RX ORDER — CLOPIDOGREL BISULFATE 75 MG/1
75 TABLET ORAL DAILY
Qty: 90 TABLET | Refills: 1 | Status: SHIPPED | OUTPATIENT
Start: 2024-11-05 | End: 2025-11-05

## 2024-11-05 RX ORDER — ATORVASTATIN CALCIUM 40 MG/1
40 TABLET, FILM COATED ORAL NIGHTLY
Qty: 90 TABLET | Refills: 1 | Status: CANCELLED | OUTPATIENT
Start: 2024-11-05 | End: 2025-11-05

## 2024-11-05 NOTE — PROGRESS NOTES
Patient called in and reported BP was 150/100s and resting HR between 60-80 bpm since starting ivabradine. I discussed case with Dr. Stokes and agree the ivabradine is likely driving the BP up, therefore, I will stop the med. I can further titrate up the carvedilol to a max of 50mg twice daily so I will instruct Sd to take carvedilol 2 tablets (50mg) in the AM and 1 tablet (25mg) in the PM. I will have the office call on Monday to report BP and HR back to me.

## 2024-11-05 NOTE — TELEPHONE ENCOUNTER
L/M for pt to call back. Per Bhargavi:   have him stop the ivabradine...an adverse effect is high blood pressure and I think that is now what is causing his BP to spike so high. I am going to increase the carvedilol up. I want him to take 2 tablets in the morning to equal 50mg and continue 25mg in the evening.

## 2024-11-08 DIAGNOSIS — I50.22 CHRONIC SYSTOLIC HEART FAILURE: ICD-10-CM

## 2024-11-08 RX ORDER — CARVEDILOL 25 MG/1
25 TABLET ORAL
Qty: 180 TABLET | Refills: 3 | Status: SHIPPED | OUTPATIENT
Start: 2024-11-17 | End: 2025-11-17

## 2024-11-08 NOTE — TELEPHONE ENCOUNTER
Patient wife called in for a refill on Carvedilol. Did note a future order however it was being sent to [facility] and patient would like medication to go to [facility].

## 2024-11-11 DIAGNOSIS — R35.1 NOCTURIA: ICD-10-CM

## 2024-11-12 ENCOUNTER — TELEPHONE (OUTPATIENT)
Dept: CARDIOLOGY | Facility: CLINIC | Age: 54
End: 2024-11-12
Payer: COMMERCIAL

## 2024-11-13 RX ORDER — ALFUZOSIN HYDROCHLORIDE 10 MG/1
TABLET, EXTENDED RELEASE ORAL
Qty: 90 TABLET | Refills: 2 | Status: SHIPPED | OUTPATIENT
Start: 2024-11-13

## 2024-11-14 DIAGNOSIS — I50.22 CHRONIC SYSTOLIC HEART FAILURE: ICD-10-CM

## 2024-11-14 RX ORDER — CARVEDILOL 25 MG/1
50 TABLET ORAL 2 TIMES DAILY
Qty: 360 TABLET | Refills: 3 | Status: SHIPPED | OUTPATIENT
Start: 2024-11-21 | End: 2025-11-21

## 2024-11-14 RX ORDER — CARVEDILOL 25 MG/1
50 TABLET ORAL 2 TIMES DAILY
Qty: 28 TABLET | Refills: 0 | Status: SHIPPED | OUTPATIENT
Start: 2024-11-14 | End: 2024-11-21

## 2024-11-19 ENCOUNTER — TELEPHONE (OUTPATIENT)
Dept: PAIN MEDICINE | Facility: CLINIC | Age: 54
End: 2024-11-19
Payer: COMMERCIAL

## 2024-11-19 DIAGNOSIS — M47.812 SPONDYLOSIS OF CERVICAL JOINT WITHOUT MYELOPATHY: ICD-10-CM

## 2024-11-19 DIAGNOSIS — M54.12 CERVICAL RADICULITIS: ICD-10-CM

## 2024-11-19 RX ORDER — OXYCODONE AND ACETAMINOPHEN 5; 325 MG/1; MG/1
1-2 TABLET ORAL DAILY PRN
Qty: 60 TABLET | Refills: 0 | Status: SHIPPED | OUTPATIENT
Start: 2024-11-19

## 2024-11-19 RX ORDER — DICLOFENAC SODIUM 10 MG/G
4 GEL TOPICAL 4 TIMES DAILY PRN
Qty: 300 G | Refills: 2 | Status: SHIPPED | OUTPATIENT
Start: 2024-11-19

## 2024-11-20 DIAGNOSIS — I50.22 CHRONIC SYSTOLIC HEART FAILURE: ICD-10-CM

## 2024-11-20 RX ORDER — CARVEDILOL 25 MG/1
50 TABLET ORAL 2 TIMES DAILY
Qty: 360 TABLET | Refills: 3 | Status: SHIPPED | OUTPATIENT
Start: 2024-11-20 | End: 2025-11-20

## 2024-11-28 DIAGNOSIS — I50.22 CHRONIC SYSTOLIC HEART FAILURE: ICD-10-CM

## 2024-11-29 RX ORDER — EMPAGLIFLOZIN 10 MG/1
10 TABLET, FILM COATED ORAL DAILY
Qty: 90 TABLET | Refills: 0 | Status: SHIPPED | OUTPATIENT
Start: 2024-11-29

## 2024-12-23 ENCOUNTER — HOSPITAL ENCOUNTER (OUTPATIENT)
Dept: CARDIOLOGY | Facility: HOSPITAL | Age: 54
Discharge: HOME | End: 2024-12-23
Payer: COMMERCIAL

## 2024-12-23 DIAGNOSIS — I48.0 PAROXYSMAL ATRIAL FIBRILLATION (MULTI): ICD-10-CM

## 2024-12-23 DIAGNOSIS — I49.1 ATRIAL PREMATURE DEPOLARIZATION: ICD-10-CM

## 2024-12-23 DIAGNOSIS — I50.22 CHRONIC SYSTOLIC CONGESTIVE HEART FAILURE: ICD-10-CM

## 2024-12-23 DIAGNOSIS — I10 PRIMARY HYPERTENSION: Primary | ICD-10-CM

## 2024-12-23 PROCEDURE — 93246 EXT ECG>7D<15D RECORDING: CPT

## 2024-12-23 RX ORDER — HYDRALAZINE HYDROCHLORIDE 25 MG/1
25 TABLET, FILM COATED ORAL 3 TIMES DAILY
Qty: 90 TABLET | Refills: 11 | Status: SHIPPED | OUTPATIENT
Start: 2024-12-23 | End: 2025-12-23

## 2024-12-23 RX ORDER — NAPROXEN SODIUM 220 MG/1
81 TABLET, FILM COATED ORAL DAILY
Start: 2024-12-23 | End: 2025-12-23

## 2024-12-23 NOTE — PROGRESS NOTES
Patient messaged me and informed me he went to his PCP office over the weekend and was noted to be in afib as well as hypertensive. His PCP ordered a 2 week cardiac monitor. Given patient's LUPE?DS?-VASc Score is >2, I will go ahead and start Xarelto 20mg daily for stroke prophylaxis while he is wearing the monitor. Will re-evaluate after review of monitor results. Will stop plavix 75mg daily to avoid triple therapy. For his HTN, I will go ahead and start hydralazine 25mg p.o. three times daily. I will have the patient see me in the office in 4 weeks.

## 2024-12-24 ENCOUNTER — TELEPHONE (OUTPATIENT)
Dept: PAIN MEDICINE | Facility: CLINIC | Age: 54
End: 2024-12-24
Payer: COMMERCIAL

## 2024-12-24 NOTE — TELEPHONE ENCOUNTER
Patient called requesting a refill of Oxycodone Acetaminophen.  His last office appointment was 10/22/24.  He would like it sent to [facility] in [place].  Thanks!

## 2024-12-27 DIAGNOSIS — M54.12 CERVICAL RADICULITIS: ICD-10-CM

## 2024-12-27 DIAGNOSIS — M47.812 SPONDYLOSIS OF CERVICAL JOINT WITHOUT MYELOPATHY: ICD-10-CM

## 2024-12-27 RX ORDER — OXYCODONE AND ACETAMINOPHEN 5; 325 MG/1; MG/1
1-2 TABLET ORAL DAILY PRN
Qty: 60 TABLET | Refills: 0 | Status: SHIPPED | OUTPATIENT
Start: 2024-12-27

## 2025-01-02 ENCOUNTER — HOSPITAL ENCOUNTER (OUTPATIENT)
Dept: CARDIOLOGY | Facility: HOSPITAL | Age: 55
Discharge: HOME | End: 2025-01-02
Payer: COMMERCIAL

## 2025-01-02 ENCOUNTER — APPOINTMENT (OUTPATIENT)
Dept: RADIOLOGY | Facility: HOSPITAL | Age: 55
End: 2025-01-02
Payer: COMMERCIAL

## 2025-01-02 ENCOUNTER — HOSPITAL ENCOUNTER (EMERGENCY)
Facility: HOSPITAL | Age: 55
Discharge: HOME | End: 2025-01-02
Attending: EMERGENCY MEDICINE
Payer: COMMERCIAL

## 2025-01-02 VITALS
HEART RATE: 107 BPM | OXYGEN SATURATION: 94 % | BODY MASS INDEX: 27.2 KG/M2 | HEIGHT: 70 IN | DIASTOLIC BLOOD PRESSURE: 85 MMHG | WEIGHT: 190 LBS | TEMPERATURE: 98.2 F | RESPIRATION RATE: 20 BRPM | SYSTOLIC BLOOD PRESSURE: 112 MMHG

## 2025-01-02 DIAGNOSIS — U07.1 COVID: Primary | ICD-10-CM

## 2025-01-02 LAB
ALBUMIN SERPL BCP-MCNC: 4.6 G/DL (ref 3.4–5)
ALP SERPL-CCNC: 123 U/L (ref 33–120)
ALT SERPL W P-5'-P-CCNC: 19 U/L (ref 10–52)
ANION GAP SERPL CALC-SCNC: 13 MMOL/L (ref 10–20)
AST SERPL W P-5'-P-CCNC: 13 U/L (ref 9–39)
BASOPHILS # BLD AUTO: 0.05 X10*3/UL (ref 0–0.1)
BASOPHILS NFR BLD AUTO: 0.5 %
BILIRUB SERPL-MCNC: 0.9 MG/DL (ref 0–1.2)
BNP SERPL-MCNC: 13 PG/ML (ref 0–99)
BUN SERPL-MCNC: 25 MG/DL (ref 6–23)
CALCIUM SERPL-MCNC: 9.4 MG/DL (ref 8.6–10.3)
CARDIAC TROPONIN I PNL SERPL HS: 4 NG/L (ref 0–20)
CARDIAC TROPONIN I PNL SERPL HS: 4 NG/L (ref 0–20)
CHLORIDE SERPL-SCNC: 107 MMOL/L (ref 98–107)
CO2 SERPL-SCNC: 22 MMOL/L (ref 21–32)
CREAT SERPL-MCNC: 0.98 MG/DL (ref 0.5–1.3)
D DIMER PPP FEU-MCNC: <215 NG/ML FEU
EGFRCR SERPLBLD CKD-EPI 2021: >90 ML/MIN/1.73M*2
EOSINOPHIL # BLD AUTO: 0.11 X10*3/UL (ref 0–0.7)
EOSINOPHIL NFR BLD AUTO: 1.1 %
ERYTHROCYTE [DISTWIDTH] IN BLOOD BY AUTOMATED COUNT: 11.8 % (ref 11.5–14.5)
FLUAV RNA RESP QL NAA+PROBE: NOT DETECTED
FLUBV RNA RESP QL NAA+PROBE: NOT DETECTED
GLUCOSE SERPL-MCNC: 178 MG/DL (ref 74–99)
HCT VFR BLD AUTO: 44.4 % (ref 41–52)
HGB BLD-MCNC: 15.6 G/DL (ref 13.5–17.5)
IMM GRANULOCYTES # BLD AUTO: 0.08 X10*3/UL (ref 0–0.7)
IMM GRANULOCYTES NFR BLD AUTO: 0.8 % (ref 0–0.9)
LYMPHOCYTES # BLD AUTO: 0.87 X10*3/UL (ref 1.2–4.8)
LYMPHOCYTES NFR BLD AUTO: 9 %
MAGNESIUM SERPL-MCNC: 1.51 MG/DL (ref 1.6–2.4)
MCH RBC QN AUTO: 31.3 PG (ref 26–34)
MCHC RBC AUTO-ENTMCNC: 35.1 G/DL (ref 32–36)
MCV RBC AUTO: 89 FL (ref 80–100)
MONOCYTES # BLD AUTO: 0.63 X10*3/UL (ref 0.1–1)
MONOCYTES NFR BLD AUTO: 6.5 %
NEUTROPHILS # BLD AUTO: 7.89 X10*3/UL (ref 1.2–7.7)
NEUTROPHILS NFR BLD AUTO: 82.1 %
NRBC BLD-RTO: 0 /100 WBCS (ref 0–0)
PLATELET # BLD AUTO: 272 X10*3/UL (ref 150–450)
POTASSIUM SERPL-SCNC: 4 MMOL/L (ref 3.5–5.3)
PROT SERPL-MCNC: 6.7 G/DL (ref 6.4–8.2)
RBC # BLD AUTO: 4.99 X10*6/UL (ref 4.5–5.9)
SARS-COV-2 RNA RESP QL NAA+PROBE: DETECTED
SODIUM SERPL-SCNC: 138 MMOL/L (ref 136–145)
TSH SERPL-ACNC: 1.22 MIU/L (ref 0.44–3.98)
WBC # BLD AUTO: 9.6 X10*3/UL (ref 4.4–11.3)

## 2025-01-02 PROCEDURE — 84443 ASSAY THYROID STIM HORMONE: CPT | Performed by: EMERGENCY MEDICINE

## 2025-01-02 PROCEDURE — 83735 ASSAY OF MAGNESIUM: CPT | Performed by: EMERGENCY MEDICINE

## 2025-01-02 PROCEDURE — 36415 COLL VENOUS BLD VENIPUNCTURE: CPT | Performed by: EMERGENCY MEDICINE

## 2025-01-02 PROCEDURE — 84484 ASSAY OF TROPONIN QUANT: CPT | Performed by: EMERGENCY MEDICINE

## 2025-01-02 PROCEDURE — 2500000004 HC RX 250 GENERAL PHARMACY W/ HCPCS (ALT 636 FOR OP/ED): Performed by: EMERGENCY MEDICINE

## 2025-01-02 PROCEDURE — 85379 FIBRIN DEGRADATION QUANT: CPT | Performed by: EMERGENCY MEDICINE

## 2025-01-02 PROCEDURE — 85025 COMPLETE CBC W/AUTO DIFF WBC: CPT | Performed by: EMERGENCY MEDICINE

## 2025-01-02 PROCEDURE — 83880 ASSAY OF NATRIURETIC PEPTIDE: CPT | Performed by: EMERGENCY MEDICINE

## 2025-01-02 PROCEDURE — 93005 ELECTROCARDIOGRAM TRACING: CPT

## 2025-01-02 PROCEDURE — 80053 COMPREHEN METABOLIC PANEL: CPT | Performed by: EMERGENCY MEDICINE

## 2025-01-02 PROCEDURE — 96366 THER/PROPH/DIAG IV INF ADDON: CPT

## 2025-01-02 PROCEDURE — 71045 X-RAY EXAM CHEST 1 VIEW: CPT | Mod: FOREIGN READ | Performed by: RADIOLOGY

## 2025-01-02 PROCEDURE — 87636 SARSCOV2 & INF A&B AMP PRB: CPT | Performed by: EMERGENCY MEDICINE

## 2025-01-02 PROCEDURE — 71045 X-RAY EXAM CHEST 1 VIEW: CPT

## 2025-01-02 PROCEDURE — 99284 EMERGENCY DEPT VISIT MOD MDM: CPT | Performed by: EMERGENCY MEDICINE

## 2025-01-02 PROCEDURE — 96365 THER/PROPH/DIAG IV INF INIT: CPT

## 2025-01-02 RX ORDER — MAGNESIUM SULFATE HEPTAHYDRATE 40 MG/ML
2 INJECTION, SOLUTION INTRAVENOUS ONCE
Status: COMPLETED | OUTPATIENT
Start: 2025-01-02 | End: 2025-01-02

## 2025-01-02 RX ORDER — ALBUTEROL SULFATE 90 UG/1
2 INHALANT RESPIRATORY (INHALATION) EVERY 6 HOURS PRN
Qty: 1 G | Refills: 0 | Status: SHIPPED | OUTPATIENT
Start: 2025-01-02

## 2025-01-02 RX ADMIN — MAGNESIUM SULFATE HEPTAHYDRATE 2 G: 40 INJECTION, SOLUTION INTRAVENOUS at 13:39

## 2025-01-02 ASSESSMENT — COLUMBIA-SUICIDE SEVERITY RATING SCALE - C-SSRS
6. HAVE YOU EVER DONE ANYTHING, STARTED TO DO ANYTHING, OR PREPARED TO DO ANYTHING TO END YOUR LIFE?: NO
2. HAVE YOU ACTUALLY HAD ANY THOUGHTS OF KILLING YOURSELF?: NO
1. IN THE PAST MONTH, HAVE YOU WISHED YOU WERE DEAD OR WISHED YOU COULD GO TO SLEEP AND NOT WAKE UP?: NO

## 2025-01-02 ASSESSMENT — PAIN - FUNCTIONAL ASSESSMENT: PAIN_FUNCTIONAL_ASSESSMENT: 0-10

## 2025-01-02 ASSESSMENT — PAIN SCALES - GENERAL: PAINLEVEL_OUTOF10: 5 - MODERATE PAIN

## 2025-01-02 NOTE — ED PROVIDER NOTES
HPI   Chief Complaint   Patient presents with    Shortness of Breath     Pt states ongoing SOB, tachycardia, chest tightness ongoing since cardiac stent placed January 15th. Worse the past couple days. Had event monitor placed December 23rd       Limitations to History: None    HPI: 55-year-old male presents with concern for shortness of breath.  Having episodes of tachycardia.  Patient was recently seen in primary care office and diagnosed with atrial fibrillation.  Currently on Xarelto.  Patient states his heart rate will go into the 120s at night.  Patient has been struggling with tachycardia and has had his blood pressure medication titrated.  History of PCI.  Denies any fever, chills, cough, nausea, vomiting, abdominal pain.    Additional History Obtained from: Wife at the bedside    ------------------------------------------------------------------------------------------------------------------------------------------  Physical Exam:    VS: As documented in the triage note and EMR flowsheet from this visit were reviewed.    Appearance: Alert. cooperative,  in no acute distress.   Skin: Intact,  dry skin, no lesions, rash, petechiae or purpura.   Eyes: PERRLA, EOMs intact,  Conjunctiva pink with no redness or exudates.   HENT: Normocephalic, atraumatic. Nares patent. No intraoral lesions.   Neck: Supple, without meningismus. Trachea at midline. No lymphadenopathy.  Pulmonary: Clear bilaterally with good chest wall excursion. No rales, rhonchi or wheezing. No accessory muscle use or stridor.  Cardiac: Tachycardic and regular rhythm, no rubs, murmurs, or gallops.   Abdomen: Abdomen is soft, nontender, and nondistended.  No palpable organomegaly.  No rebound or guarding.  No CVA tenderness. Nonsurgical abdomen.  Genitourinary: Exam deferred.  Musculoskeletal: Full range of motion.  Pulses full and equal. No cyanosis, clubbing, or edema.  Neurological:  Cranial nerves are grossly intact, grossly normal sensation,  no weakness, no focal findings identified.  Psychiatric: Appropriate mood and affect.                Patient History   Past Medical History:   Diagnosis Date    Ejection fraction < 50% 10/31/2023    Personal history of other diseases of the circulatory system 12/02/2019    History of essential hypertension    Type 2 diabetes mellitus without complications (Multi)     Type 2 diabetes mellitus without complication, without long-term current use of insulin     Past Surgical History:   Procedure Laterality Date    CARDIAC CATHETERIZATION N/A 1/16/2024    Procedure: Left Heart Cath;  Surgeon: Keanu Villarreal MD;  Location: [facility];  Service: Cardiovascular;  Laterality: N/A;  1230pm    CARDIAC CATHETERIZATION N/A 1/16/2024    Procedure: PCI VALENTIN Stent- Coronary;  Surgeon: Keanu Villarreal MD;  Location: [facility];  Service: Cardiovascular;  Laterality: N/A;    CT ANGIO NECK  2/9/2023    CT NECK ANGIO W AND WO IV CONTRAST 2/9/2023 DOCTOR OFFICE [facility]GAC[facility]    MR NECK ANGIO WO IV CONTRAST  2/9/2023    MR NECK ANGIO WO IV CONTRAST 2/9/2023 DOCTOR OFFICE LEGACY    OTHER SURGICAL HISTORY  10/10/2019    Medial branch block    OTHER SURGICAL HISTORY  10/10/2019    Epidural steroid injection    OTHER SURGICAL HISTORY  10/10/2019    Intra-articular corticosteroid injection    OTHER SURGICAL HISTORY  10/10/2019    Intra-articular corticosteroid injection    OTHER SURGICAL HISTORY  10/10/2019    Epidural steroid injection    OTHER SURGICAL HISTORY  10/10/2019    Epidural steroid injection    OTHER SURGICAL HISTORY  10/14/2019    Medial branch block    OTHER SURGICAL HISTORY  11/13/2020    Epidural steroid injection    OTHER SURGICAL HISTORY  02/28/2022    Epidural steroid injection    OTHER SURGICAL HISTORY  06/07/2018    Knee Arthrodesis Left     Family History   Problem Relation Name Age of Onset    Hypertension Mother      Hypertension Father      Heart attack Other GRANDPARENT     Stroke  Other GRANDPARENT      Social History     Tobacco Use    Smoking status: Never    Smokeless tobacco: Never   Substance Use Topics    Alcohol use: Yes     Comment: 10 beers yearly; occasionally    Drug use: Never       Physical Exam   ED Triage Vitals [01/02/25 1238]   Temperature Heart Rate Respirations BP   36.8 °C (98.2 °F) (!) 110 (!) 24 121/86      Pulse Ox Temp Source Heart Rate Source Patient Position   96 % Temporal -- --      BP Location FiO2 (%)     -- --       Physical Exam      ED Course & MDM   Diagnoses as of 01/02/25 1628   COVID                 No data recorded                                 Medical Decision Making  Labs Reviewed  CBC WITH AUTO DIFFERENTIAL - Abnormal     WBC                           9.6                    nRBC                          0.0                    RBC                           4.99                   Hemoglobin                    15.6                   Hematocrit                    44.4                   MCV                           89                     MCH                           31.3                   MCHC                          35.1                   RDW                           11.8                   Platelets                     272                    Neutrophils %                 82.1                   Immature Granulocytes %, Automated   0.8                    Lymphocytes %                 9.0                    Monocytes %                   6.5                    Eosinophils %                 1.1                    Basophils %                   0.5                    Neutrophils Absolute          7.89 (*)               Immature Granulocytes Absolute, Au*   0.08                   Lymphocytes Absolute          0.87 (*)               Monocytes Absolute            0.63                   Eosinophils Absolute          0.11                   Basophils Absolute            0.05                COMPREHENSIVE METABOLIC PANEL - Abnormal     Glucose                        178 (*)                Sodium                        138                    Potassium                     4.0                    Chloride                      107                    Bicarbonate                   22                     Anion Gap                     13                     Urea Nitrogen                 25 (*)                 Creatinine                    0.98                   eGFR                          >90                    Calcium                       9.4                    Albumin                       4.6                    Alkaline Phosphatase          123 (*)                Total Protein                 6.7                    AST                           13                     Bilirubin, Total              0.9                    ALT                           19                  MAGNESIUM - Abnormal     Magnesium                     1.51 (*)            SARS-COV-2 AND INFLUENZA A/B PCR - Abnormal     Flu A Result                                         Flu B Result                                         Coronavirus 2019, PCR         Detected (*)                   Narrative: This assay has received FDA Emergency Use Authorization (EUA) and  is only authorized for the duration of time that circumstances exist to justify the authorization of the emergency use of in vitro diagnostic tests for the detection of SARS-CoV-2 virus and/or diagnosis of COVID-19 infection under section 564(b)(1) of the Act, 21 U.S.C. 360bbb-3(b)(1). Testing for SARS-CoV-2 is only recommended for patients who meet current clinical and/or epidemiological criteria as defined by federal, state, or local public health directives. This assay is an in vitro diagnostic nucleic acid amplification test for the qualitative detection of SARS-CoV-2, Influenza A, and Influenza B from nasopharyngeal specimens and has been validated for use at [facility]. Negative results do not preclude COVID-19 infections or  Influenza A/B infections, and should not be used as the sole basis for diagnosis, treatment, or other management decisions. If Influenza A/B and RSV PCR results are negative, testing for Parainfluenza virus, Adenovirus and Metapneumovirus is routinely performed for [facility] pediatric oncology and intensive care inpatients, and is available on other patients by placing an add-on request.   SERIAL TROPONIN-INITIAL - Normal     Troponin I, High Sensitivity   4                          Narrative: Less than 99th percentile of normal range cutoff-                  Female and children under 18 years old <14 ng/L; Male <21 ng/L: Negative                  Repeat testing should be performed if clinically indicated.                                     Female and children under 18 years old 14-50 ng/L; Male 21-50 ng/L:                  Consistent with possible cardiac damage and possible increased clinical                   risk. Serial measurements may help to assess extent of myocardial damage.                                     >50 ng/L: Consistent with cardiac damage, increased clinical risk and                  myocardial infarction. Serial measurements may help assess extent of                   myocardial damage.                                      NOTE: Children less than 1 year old may have higher baseline troponin                   levels and results should be interpreted in conjunction with the overall                   clinical context.                                     NOTE: Troponin I testing is performed using a different                   testing methodology at [facility] than at other                   [facility]. Direct result comparisons should only                   be made within the same method.  TSH WITH REFLEX TO FREE T4 IF ABNORMAL - Normal     Thyroid Stimulating Hormone   1.22                       Narrative: TSH testing is performed using different testing methodology at [facility] than at other system hospitals. Direct result comparisons should only be made within the same method.                    D-DIMER, VTE EXCLUSION - Normal     D-Dimer, Quantitative VTE Exclusion   <215                       Narrative: The VTE Exclusion D-Dimer assay is reported in ng/mL Fibrinogen Equivalent Units (FEU).                                    Per 's instructions for use, a value of less than 500 ng/mL (FEU) may help to exclude DVT or PE in outpatients when the assay is used with a clinical pretest probability assessment.(AE must utilize and document eCalc 'Wells Score Deep Vein Thrombosis Risk' for DVT exclusion only. Emergency Department should utilize  Guidelines for Emergency Department Use of the VTE Exclusion D-Dimer and Clinical Pretest probability assessment model for DVT or PE exclusion.)  B-TYPE NATRIURETIC PEPTIDE - Normal     BNP                           13                         Narrative:    <100 pg/mL - Heart failure unlikely                  100-299 pg/mL - Intermediate probability of acute heart                                  failure exacerbation. Correlate with clinical                                  context and patient history.                    >=300 pg/mL - Heart Failure likely. Correlate with clinical                                  context and patient history.                                    BNP testing is performed using different testing methodology at [facility] than at other [facility] hospitals. Direct result comparisons should only be made within the same method.                     SERIAL TROPONIN, 1 HOUR - Normal     Troponin I, High Sensitivity   4                          Narrative: Less than 99th percentile of normal range cutoff-                  Female and children under 18 years old <14 ng/L; Male <21 ng/L: Negative                  Repeat testing should be performed if clinically indicated.                                      Female and children under 18 years old 14-50 ng/L; Male 21-50 ng/L:                  Consistent with possible cardiac damage and possible increased clinical                   risk. Serial measurements may help to assess extent of myocardial damage.                                     >50 ng/L: Consistent with cardiac damage, increased clinical risk and                  myocardial infarction. Serial measurements may help assess extent of                   myocardial damage.                                      NOTE: Children less than 1 year old may have higher baseline troponin                   levels and results should be interpreted in conjunction with the overall                   clinical context.                                     NOTE: Troponin I testing is performed using a different                   testing methodology at [facility] than at other                   [facility]. Direct result comparisons should only                   be made within the same method.  TROPONIN SERIES- (INITIAL, 1 HR)  XR chest 1 view   Final Result    No acute radiographic chest finding.    Signed by Michoacano Carl MD     Medical Decision Making:    Patient appears well nontoxic.  Slightly tachycardic upon arrival.  Found to be hypomagnesemic and treated with intravenous magnesium.  Otherwise chest x-ray is clear.  Troponin negative.  BNP negative.  Patient found to be COVID-positive.  After discussion it be treated with Paxlovid and albuterol inhaler.  Advised on continued oral hydration.  Advised on follow-up with primary care.  Stable at time of discharge.    Differential Diagnoses Considered: ACS, pneumonia, pneumothorax, viral illness, CHF    Independent Interpretation of Studies:  I independently interpreted: Chest x-ray shows no evidence of pneumonia or pneumothorax.    Escalation of Care:  Appropriate for discharge and follow-up with primary care.    Prescription Drug Consideration: Oral  Paxlovid and albuterol inhaler          Procedure  ECG 12 lead    Performed by: Hosea Sauer DO  Authorized by: Hosea Sauer DO    ECG interpreted by ED Physician in the absence of a cardiologist: yes    Comments:      EKG interpreted by Dr. Hosea Sauer: Sinus tachycardia at a rate of 108 bpm.  DC interval 170 ms.  QTc of 458 ms.  Nonspecific ST changes.  ECG 12 lead    Performed by: Hosea Sauer DO  Authorized by: Hosea Sauer DO    ECG interpreted by ED Physician in the absence of a cardiologist: yes    Comments:      Repeat EKG performed at 1349 and interpreted by Dr. Hosea Sauer at 1351: Sinus tachycardia 110 bpm.  DC interval 166 ms.  QTc of 441 ms.  Nonspecific ST changes.  No evolving changes.       Hosea Sauer DO  01/02/25 1668

## 2025-01-03 LAB
ATRIAL RATE: 108 BPM
ATRIAL RATE: 110 BPM
P AXIS: 40 DEGREES
P AXIS: 55 DEGREES
P OFFSET: 184 MS
P OFFSET: 184 MS
P ONSET: 127 MS
P ONSET: 129 MS
PR INTERVAL: 166 MS
PR INTERVAL: 170 MS
Q ONSET: 212 MS
Q ONSET: 212 MS
QRS COUNT: 18 BEATS
QRS COUNT: 18 BEATS
QRS DURATION: 82 MS
QRS DURATION: 82 MS
QT INTERVAL: 326 MS
QT INTERVAL: 342 MS
QTC CALCULATION(BAZETT): 441 MS
QTC CALCULATION(BAZETT): 458 MS
QTC FREDERICIA: 399 MS
QTC FREDERICIA: 416 MS
R AXIS: -1 DEGREES
R AXIS: -4 DEGREES
T AXIS: -11 DEGREES
T AXIS: 7 DEGREES
T OFFSET: 375 MS
T OFFSET: 383 MS
VENTRICULAR RATE: 108 BPM
VENTRICULAR RATE: 110 BPM

## 2025-01-07 ENCOUNTER — APPOINTMENT (OUTPATIENT)
Dept: CARDIOLOGY | Facility: CLINIC | Age: 55
End: 2025-01-07
Payer: COMMERCIAL

## 2025-01-14 ENCOUNTER — OFFICE VISIT (OUTPATIENT)
Dept: PAIN MEDICINE | Facility: CLINIC | Age: 55
End: 2025-01-14
Payer: COMMERCIAL

## 2025-01-14 VITALS
RESPIRATION RATE: 16 BRPM | HEART RATE: 123 BPM | WEIGHT: 199 LBS | DIASTOLIC BLOOD PRESSURE: 57 MMHG | SYSTOLIC BLOOD PRESSURE: 99 MMHG | BODY MASS INDEX: 28.55 KG/M2

## 2025-01-14 DIAGNOSIS — M17.12 UNILATERAL PRIMARY OSTEOARTHRITIS, LEFT KNEE: Primary | ICD-10-CM

## 2025-01-14 DIAGNOSIS — M12.562 TRAUMATIC ARTHROPATHY OF LEFT KNEE: ICD-10-CM

## 2025-01-14 PROCEDURE — 20610 DRAIN/INJ JOINT/BURSA W/O US: CPT

## 2025-01-14 PROCEDURE — 2500000004 HC RX 250 GENERAL PHARMACY W/ HCPCS (ALT 636 FOR OP/ED)

## 2025-01-14 PROCEDURE — 99214 OFFICE O/P EST MOD 30 MIN: CPT | Mod: 25

## 2025-01-14 RX ORDER — TRIAMCINOLONE ACETONIDE 40 MG/ML
40 INJECTION, SUSPENSION INTRA-ARTICULAR; INTRAMUSCULAR ONCE
Status: COMPLETED | OUTPATIENT
Start: 2025-01-14 | End: 2025-01-14

## 2025-01-14 RX ORDER — BUPIVACAINE HYDROCHLORIDE 2.5 MG/ML
3 INJECTION, SOLUTION EPIDURAL; INFILTRATION; INTRACAUDAL ONCE
Status: COMPLETED | OUTPATIENT
Start: 2025-01-14 | End: 2025-01-14

## 2025-01-14 RX ADMIN — TRIAMCINOLONE ACETONIDE 40 MG: 40 INJECTION, SUSPENSION INTRA-ARTICULAR; INTRAMUSCULAR at 10:49

## 2025-01-14 RX ADMIN — BUPIVACAINE HYDROCHLORIDE 7.5 MG: 2.5 INJECTION, SOLUTION EPIDURAL; INFILTRATION; INTRACAUDAL; PERINEURAL at 10:47

## 2025-01-14 ASSESSMENT — PATIENT HEALTH QUESTIONNAIRE - PHQ9
SUM OF ALL RESPONSES TO PHQ9 QUESTIONS 1 AND 2: 0
1. LITTLE INTEREST OR PLEASURE IN DOING THINGS: NOT AT ALL
2. FEELING DOWN, DEPRESSED OR HOPELESS: NOT AT ALL

## 2025-01-14 ASSESSMENT — ENCOUNTER SYMPTOMS
ENDOCRINE NEGATIVE: 1
WEAKNESS: 0
EYES NEGATIVE: 1
LIGHT-HEADEDNESS: 0
ADENOPATHY: 0
BACK PAIN: 1
DYSPHORIC MOOD: 0
ALLERGIC/IMMUNOLOGIC NEGATIVE: 1
WHEEZING: 0
MYALGIAS: 1
ARTHRALGIAS: 0
COUGH: 0
CONSTITUTIONAL NEGATIVE: 1
SHORTNESS OF BREATH: 0
BRUISES/BLEEDS EASILY: 0
PALPITATIONS: 0
FACIAL ASYMMETRY: 0

## 2025-01-14 ASSESSMENT — PAIN SCALES - GENERAL
PAINLEVEL_OUTOF10: 5 - MODERATE PAIN
PAINLEVEL_OUTOF10: 5 - MODERATE PAIN

## 2025-01-14 NOTE — PROGRESS NOTES
Subjective   Patient ID: Sd Andino is a 54 y.o. male who presents for Follow-up ([facility] to have Lt knee injection. Today he reports having pain in Lt knee 5/10, describes as sharp and throbbing and burning worse at night. He is taking Percocet, Voltaren gel, injections and tens unit these help his pain so he can function.  ) ZONIA score  34%, COMM score 0.  Screenings depression, smoking negative and falls n/a for age.   Montserrat Mcgovern RN 01/14/25 10:38 AM     The patient is a 54-year-old male who presents today for a follow-up appointment for medication management as well as a left knee steroid injection.  He currently rates his pain a 5/10, says he has good days and bad days with the pain, but currently uses Percocet and Voltaren gel and a TENS unit to help manage his pain.  Occasionally he will use the ice water machine to also help control the pain at night.  At this point, he says he is maintaining on the current regimen.  He denies side effects to any of the medications, and says they help take the edge off of pain and improve his level of functioning overall.  At his last appointment we had discussed that in the future he will need a left knee replacement, but January 2024 he had cardiac stents placed and had to be on the medication for at least a year.  Within the past couple weeks he has been newly diagnosed with A-fib and started on Xarelto.  With everything he has going on currently, he would like to just maintain with steroid injection in his knee, Percocet, Voltaren gel, and the TENS unit.        Review of Systems   Constitutional: Negative.    HENT: Negative.     Eyes: Negative.    Respiratory:  Negative for cough, shortness of breath and wheezing.    Cardiovascular:  Negative for chest pain, palpitations and leg swelling.   Endocrine: Negative.    Genitourinary: Negative.    Musculoskeletal:  Positive for back pain and myalgias. Negative for arthralgias.   Skin: Negative.    Allergic/Immunologic:  Negative.    Neurological:  Negative for facial asymmetry, weakness and light-headedness.   Hematological:  Negative for adenopathy. Does not bruise/bleed easily.   Psychiatric/Behavioral:  Negative for dysphoric mood and suicidal ideas.        Objective   Physical Exam  Constitutional:       General: He is not in acute distress.     Appearance: Normal appearance.   HENT:      Head: Normocephalic.      Mouth/Throat:      Mouth: Mucous membranes are moist.   Eyes:      Extraocular Movements: Extraocular movements intact.   Cardiovascular:      Rate and Rhythm: Normal rate and regular rhythm.      Pulses: Normal pulses.      Heart sounds: Normal heart sounds. No murmur heard.     No friction rub. No gallop.   Pulmonary:      Effort: Pulmonary effort is normal.      Breath sounds: Normal breath sounds. No wheezing, rhonchi or rales.   Abdominal:      General: Abdomen is flat.      Palpations: Abdomen is soft.   Musculoskeletal:      Cervical back: Normal range of motion.      Right lower leg: No edema.      Left lower leg: No edema.      Comments: Ambulates without assistance  Strength 5/5 BLE  Left knee painful flexion and extension, no evident joint effusion   Lymphadenopathy:      Cervical: No cervical adenopathy.   Skin:     General: Skin is warm and dry.   Neurological:      General: No focal deficit present.      Mental Status: He is alert and oriented to person, place, and time. Mental status is at baseline.   Psychiatric:         Mood and Affect: Mood normal.         Behavior: Behavior normal.         Assessment/Plan   Diagnoses and all orders for this visit:  Unilateral primary osteoarthritis, left knee  -     bupivacaine PF 0.25 % (Marcaine) 0.25 % (2.5 mg/mL) injection 7.5 mg  -     triamcinolone acetonide (Kenalog-40) injection 40 mg  Traumatic arthropathy of left knee       The patient is a 54-year-old male with a past medical history significant for the above-mentioned problems.  We discussed risks and  side effects of opioid therapy.  His PTA is up-to-date, UDS is up-to-date, PDMP reviewed.  He does not need refills of his Percocet at this time.  He says he has received steroid injections in his knees for the past 15 years.  He tends to get around 3 months of relief before it wears off.  He is interested in getting a steroid injection in his knee today, see separate procedure documentation.  He is anticipating needing another 1 in 3 months.  He would like to follow-up with us in 3 months, call the clinic sooner if needed.    Patient ID: Sd Andino is a 54 y.o. male.    Joint Injection/Aspiration    Date/Time: 1/14/2025 11:05 AM    Performed by: MARTINEZ Velazco  Authorized by: MARTINEZ Velazco    Consent:     Consent obtained:  Written    Consent given by:  Patient    Risks, benefits, and alternatives were discussed: yes      Risks discussed:  Bleeding, infection, nerve damage and pain    Alternatives discussed:  No treatment, delayed treatment, observation and referral  Universal protocol:     Procedure explained and questions answered to patient or proxy's satisfaction: yes      Relevant documents present and verified: yes      Test results available: yes      Imaging studies available: yes      Site/side marked: yes      Immediately prior to procedure, a time out was called: yes      Patient identity confirmed:  Verbally with patient  Location:     Location:  Knee    Knee:  L knee  Anesthesia:     Anesthesia method:  None  Procedure details:     Preparation: Patient was prepped and draped in usual sterile fashion      Needle gauge: 27G.    Ultrasound guidance: no      Approach:  Anterior    Aspirate amount:  None    Steroid injected: yes      Specimen collected: no    Post-procedure details:     Dressing:  Adhesive bandage    Procedure completion:  Tolerated well, no immediate complications

## 2025-01-22 DIAGNOSIS — I48.0 PAROXYSMAL ATRIAL FIBRILLATION (MULTI): ICD-10-CM

## 2025-01-22 NOTE — PROGRESS NOTES
Patient's monitor results are showing no evidence of afib but does show a 14% burden of SVE and average heart rate of 106 bpm which is not ideal given his HF hx. I discussed case with Dr. Louis who agrees he may need an EP study or sotalol given his HF dx and is willing to see patient. Will schedule patient with Dr. Louis. Patient to continue Xarelto for now as his LUPE?DS?-VASc Score is >1.

## 2025-01-25 DIAGNOSIS — R07.9 CHEST PAIN: ICD-10-CM

## 2025-01-27 RX ORDER — CLOPIDOGREL BISULFATE 75 MG/1
75 TABLET ORAL DAILY
Qty: 90 TABLET | Refills: 3 | OUTPATIENT
Start: 2025-01-27

## 2025-02-07 ENCOUNTER — TELEPHONE (OUTPATIENT)
Dept: PAIN MEDICINE | Facility: CLINIC | Age: 55
End: 2025-02-07
Payer: COMMERCIAL

## 2025-02-07 DIAGNOSIS — I10 PRIMARY HYPERTENSION: ICD-10-CM

## 2025-02-07 DIAGNOSIS — I50.22 CHRONIC SYSTOLIC CONGESTIVE HEART FAILURE: ICD-10-CM

## 2025-02-07 DIAGNOSIS — M54.12 CERVICAL RADICULITIS: ICD-10-CM

## 2025-02-07 DIAGNOSIS — M47.812 SPONDYLOSIS OF CERVICAL JOINT WITHOUT MYELOPATHY: ICD-10-CM

## 2025-02-07 RX ORDER — OXYCODONE AND ACETAMINOPHEN 5; 325 MG/1; MG/1
1-2 TABLET ORAL DAILY PRN
Qty: 60 TABLET | Refills: 0 | Status: SHIPPED | OUTPATIENT
Start: 2025-02-07

## 2025-02-07 RX ORDER — HYDRALAZINE HYDROCHLORIDE 25 MG/1
25 TABLET, FILM COATED ORAL 3 TIMES DAILY
Qty: 90 TABLET | Refills: 11 | Status: SHIPPED | OUTPATIENT
Start: 2025-02-07 | End: 2026-02-07

## 2025-02-10 DIAGNOSIS — R35.1 NOCTURIA: ICD-10-CM

## 2025-02-11 ENCOUNTER — APPOINTMENT (OUTPATIENT)
Dept: CARDIOLOGY | Facility: HOSPITAL | Age: 55
End: 2025-02-11
Payer: COMMERCIAL

## 2025-02-11 ENCOUNTER — APPOINTMENT (OUTPATIENT)
Dept: CARDIOLOGY | Facility: CLINIC | Age: 55
End: 2025-02-11
Payer: COMMERCIAL

## 2025-02-11 VITALS
HEART RATE: 108 BPM | BODY MASS INDEX: 28.55 KG/M2 | SYSTOLIC BLOOD PRESSURE: 100 MMHG | HEIGHT: 70 IN | OXYGEN SATURATION: 93 % | DIASTOLIC BLOOD PRESSURE: 78 MMHG

## 2025-02-11 DIAGNOSIS — I50.22 CHRONIC SYSTOLIC HEART FAILURE: ICD-10-CM

## 2025-02-11 DIAGNOSIS — I10 HYPERTENSION, UNSPECIFIED TYPE: ICD-10-CM

## 2025-02-11 DIAGNOSIS — I49.1 PAC (PREMATURE ATRIAL CONTRACTION): ICD-10-CM

## 2025-02-11 DIAGNOSIS — Z95.5 PRESENCE OF DRUG COATED STENT IN LAD CORONARY ARTERY: Primary | ICD-10-CM

## 2025-02-11 DIAGNOSIS — R00.0 SINUS TACHYCARDIA: ICD-10-CM

## 2025-02-11 PROBLEM — E11.9 TYPE 2 DIABETES MELLITUS WITHOUT COMPLICATION (MULTI): Status: ACTIVE | Noted: 2023-02-10

## 2025-02-11 PROCEDURE — 93000 ELECTROCARDIOGRAM COMPLETE: CPT | Performed by: STUDENT IN AN ORGANIZED HEALTH CARE EDUCATION/TRAINING PROGRAM

## 2025-02-11 PROCEDURE — 3078F DIAST BP <80 MM HG: CPT | Performed by: STUDENT IN AN ORGANIZED HEALTH CARE EDUCATION/TRAINING PROGRAM

## 2025-02-11 PROCEDURE — 1036F TOBACCO NON-USER: CPT | Performed by: STUDENT IN AN ORGANIZED HEALTH CARE EDUCATION/TRAINING PROGRAM

## 2025-02-11 PROCEDURE — 99214 OFFICE O/P EST MOD 30 MIN: CPT | Performed by: STUDENT IN AN ORGANIZED HEALTH CARE EDUCATION/TRAINING PROGRAM

## 2025-02-11 PROCEDURE — 3074F SYST BP LT 130 MM HG: CPT | Performed by: STUDENT IN AN ORGANIZED HEALTH CARE EDUCATION/TRAINING PROGRAM

## 2025-02-11 RX ORDER — DESMOPRESSIN ACETATE 0.2 MG/1
200 TABLET ORAL NIGHTLY
Qty: 90 TABLET | Refills: 3 | Status: SHIPPED | OUTPATIENT
Start: 2025-02-11

## 2025-02-11 RX ORDER — IVABRADINE 5 MG/1
2.5 TABLET, FILM COATED ORAL 2 TIMES DAILY
Qty: 30 TABLET | Refills: 11 | Status: SHIPPED | OUTPATIENT
Start: 2025-02-11 | End: 2026-02-11

## 2025-02-11 NOTE — PROGRESS NOTES
Cardiac Electrophysiology Office Visit     Referred by Bhargavi Benitez APRN-AUSTEN* for   Chief Complaint   Patient presents with    Atrial Fibrillation     HPI:  Sd Andino is a 54 y.o. year old male patient with h/o HTN, possible inappropriate sinus tachycardia, CAD s/p PCI (LAD January 2024), HFrEF, HLD, DM presenting today to establish care    Objective  Current Outpatient Medications   Medication Instructions    albuterol 90 mcg/actuation inhaler 2 puffs, inhalation, Every 6 hours PRN    alfuzosin (Uroxatral) 10 mg 24 hr tablet TAKE 1 TABLET DAILY. DO NOTCRUSH, CHEW OR SPLIT    ascorbic acid (VITAMIN C) 500 mg, Daily    aspirin 81 mg, oral, Daily    atorvastatin (LIPITOR) 40 mg, oral, Nightly    blood sugar diagnostic (Accu-Chek Alicia Plus test strp) strip Accu-Chek Alicia Plus In Vitro Strip   Refills: 0        Start : 15-Nov-2017   Active    carvedilol (COREG) 50 mg, oral, 2 times daily    cholecalciferol (VITAMIN D-3) 50 mcg    desmopressin (DDAVP) 0.2 mg, oral, Nightly    diclofenac sodium (VOLTAREN) 4 g, Topical, 4 times daily PRN    FreeStyle Romi sensor system (FreeStyle Romi 2 Sensor) kit 1 Device    glucosamine sulfate 500 mg, 2 times daily    hydrALAZINE (APRESOLINE) 25 mg, oral, 3 times daily    Jardiance 10 mg, oral, Daily    LANCETS-BLOOD GLUCOSE STRIPS MISC Use to check blood sugar 4 times daily    metFORMIN (Glucophage) 1,000 mg tablet 1 tablet, Every 12 hours    montelukast (Singulair) 10 mg tablet 1 tablet, Once as needed    multivit-minerals/folic/ginkgo (DAILY MULTIPLE ORAL) 1 tablet, Daily    omega3/dha/epa/fish oil/vit D3 (OMEGA-3 PLUS VITAMIN D3 ORAL) 1 capsule, Daily    OneTouch Verio Flex meter misc USE AS DIRECTED.    oxyCODONE-acetaminophen (Percocet) 5-325 mg tablet 1-2 tablets, oral, Daily PRN    Ozempic 1 mg, Once Weekly    pantoprazole (PROTONIX) 20 mg, Daily    rivaroxaban (XARELTO) 20 mg, oral, Daily with evening meal, Take with food.    sacubitriL-valsartan 97 mg-103 mg  "(Entresto)  mg tablet TAKE 1 TABLET TWICE A DAY    spironolactone (ALDACTONE) 25 mg, oral, Daily    syr,ndl,ins,safe 0.5mL,disp un (INSULIN SYRINGE-NEEDLE,DISPOS. MISC) USE ONE SYRINGE ONCE A WEEK    syringe with needle 3 mL 25 gauge x 1\" syringe USE ONE SYRINGE ONCE A WEEK    tadalafil (CIALIS) 5 mg, oral, Daily    tadalafil 20 mg tablet 1 tablet, As needed    testosterone cypionate (DEPO-TESTOSTERONE) 200 mg, Once as needed    zinc gluconate 50 mg tablet 1 tablet, Daily    zolpidem (Ambien) 10 mg tablet 1 tablet, Nightly PRN         Visit Vitals  /78   Pulse 108   Ht 1.778 m (5' 10\")   SpO2 93%   BMI 28.55 kg/m²   Smoking Status Never   BSA 2.11 m²      Physical Exam  Vitals reviewed.   Constitutional:       Appearance: Normal appearance.   HENT:      Head: Normocephalic.   Cardiovascular:      Rate and Rhythm: Regular rhythm. Tachycardia present.   Pulmonary:      Effort: Pulmonary effort is normal. No respiratory distress.      Breath sounds: No wheezing.   Skin:     General: Skin is warm and dry.      Capillary Refill: Capillary refill takes less than 2 seconds.   Neurological:      Mental Status: He is alert.   Psychiatric:         Mood and Affect: Mood normal.           My Interpretation of Reviewed Study(s):  Echo (April 2024): Moderately reduced LV function with an EF of 35-40%.  Normal left atrium with normal right atrium.  No significant valvular pathology.  Cath (January 2024): Right coronary dominant system with a distal LAD 90% stenosis successful PCI to distal LAD with single drug-eluting stent  14-day cardiac Monitor (December 2024): Predominant sinus rhythm with average heart rate of 108 bpm.  75% of the time spent with heart rate greater than 100 bpm.  PACs 14%    Assessment/Plan   # HFrEF -ischemic cardiomyopathy  #PACs  #Inappropriate sinus tachycardia  Patient has a mixed pathology for his heart failure associated with ischemic cardiomyopathy in the setting of CAD (underwent PCI in " January 2024).  He also was noted to have elevated PAC burden with IST noted.  Patient was started on ivabradine but reported that his blood pressures were elevated with ivabradine and it was promptly discontinued.  His overall heart rates remain elevated and are proving to be quite difficult to control.  Class Ic agents are not an option given his CAD history.  Occasional light headedness and dizziness  a/w with Blood pressure changes.   Unclear etiology of sinus tachycardia.  Patient previously tried ivabradine but had increase in blood pressure but now since he is on carvedilol reasonable to try consider ivabradine again.  Alternatively we discussed alternative antiarrhythmics (Tikosyn) however patient is not keen on inpatient medication loading at this time.  EP study and ablation for PACs is reasonable but it is unlikely were going to be able to control his sinus tachycardia with an ablation without increased risk of sinus node dysfunction needing pacemaker.  Resume Ivabradine 2.5mg BID  If no improvement or patient has similar side effects to ivabradine causing increased blood pressure then likely need to plan for inpatient Tikosyn loading    Return to Clinic: Patient should return to the EP Clinic in 2 months    Jeremy Louis MD [facility]  Cardiac Electrophysiology  [email or web address]    **Disclaimer: This note was dictated by speech recognition, and every effort has been made to prevent any error in transcription, however minor errors may be present**

## 2025-02-13 ENCOUNTER — TELEPHONE (OUTPATIENT)
Dept: CARDIOLOGY | Facility: CLINIC | Age: 55
End: 2025-02-13
Payer: COMMERCIAL

## 2025-02-13 NOTE — TELEPHONE ENCOUNTER
Chio from [employer] [phone] called and was inquiring if there is any other alternative tx that could be used in place of Ivabradine d/t high cost. Would you like to change this medication?

## 2025-02-17 PROBLEM — E66.9 OBESITY: Status: RESOLVED | Noted: 2023-10-11 | Resolved: 2025-02-17

## 2025-02-17 PROBLEM — E29.1 DEFICIENCY OF TESTOSTERONE BIOSYNTHESIS: Status: RESOLVED | Noted: 2022-03-27 | Resolved: 2025-02-17

## 2025-02-18 ENCOUNTER — APPOINTMENT (OUTPATIENT)
Dept: CARDIOLOGY | Facility: CLINIC | Age: 55
End: 2025-02-18
Payer: COMMERCIAL

## 2025-02-25 ENCOUNTER — APPOINTMENT (OUTPATIENT)
Dept: CARDIOLOGY | Facility: CLINIC | Age: 55
End: 2025-02-25
Payer: COMMERCIAL

## 2025-02-26 DIAGNOSIS — I50.22 CHRONIC SYSTOLIC HEART FAILURE: ICD-10-CM

## 2025-02-26 RX ORDER — EMPAGLIFLOZIN 10 MG/1
10 TABLET, FILM COATED ORAL DAILY
Qty: 90 TABLET | Refills: 3 | Status: SHIPPED | OUTPATIENT
Start: 2025-02-26

## 2025-03-11 ENCOUNTER — TELEPHONE (OUTPATIENT)
Dept: PAIN MEDICINE | Facility: CLINIC | Age: 55
End: 2025-03-11

## 2025-03-11 ENCOUNTER — APPOINTMENT (OUTPATIENT)
Dept: CARDIOLOGY | Facility: CLINIC | Age: 55
End: 2025-03-11
Payer: COMMERCIAL

## 2025-03-11 VITALS
BODY MASS INDEX: 30.91 KG/M2 | WEIGHT: 215.9 LBS | SYSTOLIC BLOOD PRESSURE: 128 MMHG | OXYGEN SATURATION: 96 % | HEIGHT: 70 IN | HEART RATE: 96 BPM | DIASTOLIC BLOOD PRESSURE: 70 MMHG

## 2025-03-11 DIAGNOSIS — I10 HYPERTENSION, UNSPECIFIED TYPE: ICD-10-CM

## 2025-03-11 DIAGNOSIS — M54.12 CERVICAL RADICULITIS: ICD-10-CM

## 2025-03-11 DIAGNOSIS — R00.0 SINUS TACHYCARDIA: Primary | ICD-10-CM

## 2025-03-11 DIAGNOSIS — Z95.5 PRESENCE OF DRUG COATED STENT IN LAD CORONARY ARTERY: ICD-10-CM

## 2025-03-11 DIAGNOSIS — M47.812 SPONDYLOSIS OF CERVICAL JOINT WITHOUT MYELOPATHY: ICD-10-CM

## 2025-03-11 DIAGNOSIS — I49.1 PAC (PREMATURE ATRIAL CONTRACTION): ICD-10-CM

## 2025-03-11 DIAGNOSIS — I50.22 CHRONIC SYSTOLIC HEART FAILURE: ICD-10-CM

## 2025-03-11 PROCEDURE — 99214 OFFICE O/P EST MOD 30 MIN: CPT | Performed by: STUDENT IN AN ORGANIZED HEALTH CARE EDUCATION/TRAINING PROGRAM

## 2025-03-11 PROCEDURE — 3078F DIAST BP <80 MM HG: CPT | Performed by: STUDENT IN AN ORGANIZED HEALTH CARE EDUCATION/TRAINING PROGRAM

## 2025-03-11 PROCEDURE — 1036F TOBACCO NON-USER: CPT | Performed by: STUDENT IN AN ORGANIZED HEALTH CARE EDUCATION/TRAINING PROGRAM

## 2025-03-11 PROCEDURE — 93000 ELECTROCARDIOGRAM COMPLETE: CPT | Performed by: STUDENT IN AN ORGANIZED HEALTH CARE EDUCATION/TRAINING PROGRAM

## 2025-03-11 PROCEDURE — 3008F BODY MASS INDEX DOCD: CPT | Performed by: STUDENT IN AN ORGANIZED HEALTH CARE EDUCATION/TRAINING PROGRAM

## 2025-03-11 PROCEDURE — 3074F SYST BP LT 130 MM HG: CPT | Performed by: STUDENT IN AN ORGANIZED HEALTH CARE EDUCATION/TRAINING PROGRAM

## 2025-03-11 RX ORDER — OXYCODONE AND ACETAMINOPHEN 5; 325 MG/1; MG/1
1-2 TABLET ORAL DAILY PRN
Qty: 60 TABLET | Refills: 0 | Status: SHIPPED | OUTPATIENT
Start: 2025-03-11

## 2025-03-11 RX ORDER — DICLOFENAC SODIUM 10 MG/G
4 GEL TOPICAL 4 TIMES DAILY PRN
Qty: 300 G | Refills: 2 | Status: SHIPPED | OUTPATIENT
Start: 2025-03-11

## 2025-03-11 RX ORDER — IVABRADINE 5 MG/1
2.5 TABLET, FILM COATED ORAL 2 TIMES DAILY
Qty: 30 TABLET | Refills: 11 | Status: SHIPPED | OUTPATIENT
Start: 2025-03-11 | End: 2026-03-11

## 2025-03-11 NOTE — PROGRESS NOTES
Cardiac Electrophysiology Office Visit     Referred by Dr. Sarah ref. provider found for   Chief Complaint   Patient presents with    Follow-up     SOB for past 3 days, denies chest pain     HPI:  Sd Andino is a 54 y.o. year old male patient with h/o HTN, possible inappropriate sinus tachycardia, CAD s/p PCI (LAD January 2024), HFrEF, HLD, DM presenting today for follow up    Objective  Current Outpatient Medications   Medication Instructions    albuterol 90 mcg/actuation inhaler 2 puffs, inhalation, Every 6 hours PRN    alfuzosin (Uroxatral) 10 mg 24 hr tablet TAKE 1 TABLET DAILY. DO NOTCRUSH, CHEW OR SPLIT    ascorbic acid (VITAMIN C) 500 mg, Daily    aspirin 81 mg, oral, Daily    atorvastatin (LIPITOR) 40 mg, oral, Nightly    blood sugar diagnostic (Accu-Chek Alicia Plus test strp) strip Accu-Chek Alicia Plus In Vitro Strip   Refills: 0        Start : 15-Nov-2017   Active    carvedilol (COREG) 50 mg, oral, 2 times daily    cholecalciferol (VITAMIN D-3) 50 mcg    desmopressin (DDAVP) 200 mcg, oral, Nightly    diclofenac sodium (VOLTAREN) 4 g, Topical, 4 times daily PRN    FreeStyle Romi sensor system (FreeStyle Romi 2 Sensor) kit 1 Device    glucosamine sulfate 500 mg, 2 times daily    hydrALAZINE (APRESOLINE) 25 mg, oral, 3 times daily    ivabradine (CORLANOR) 2.5 mg, oral, 2 times daily    Jardiance 10 mg, oral, Daily    LANCETS-BLOOD GLUCOSE STRIPS MISC Use to check blood sugar 4 times daily    metFORMIN (Glucophage) 1,000 mg tablet 1 tablet, Every 12 hours    montelukast (Singulair) 10 mg tablet 1 tablet, Once as needed    multivit-minerals/folic/ginkgo (DAILY MULTIPLE ORAL) 1 tablet, Daily    omega3/dha/epa/fish oil/vit D3 (OMEGA-3 PLUS VITAMIN D3 ORAL) 1 capsule, Daily    OneTouch Verio Flex meter misc USE AS DIRECTED.    oxyCODONE-acetaminophen (Percocet) 5-325 mg tablet 1-2 tablets, oral, Daily PRN    Ozempic 1 mg, Once Weekly    pantoprazole (PROTONIX) 20 mg, Daily    rivaroxaban (XARELTO) 20 mg,  "oral, Daily with evening meal, Take with food.    sacubitriL-valsartan 97 mg-103 mg (Entresto)  mg tablet TAKE 1 TABLET TWICE A DAY    spironolactone (ALDACTONE) 25 mg, oral, Daily    syr,ndl,ins,safe 0.5mL,disp un (INSULIN SYRINGE-NEEDLE,DISPOS. MISC) USE ONE SYRINGE ONCE A WEEK    syringe with needle 3 mL 25 gauge x 1\" syringe USE ONE SYRINGE ONCE A WEEK    tadalafil (CIALIS) 5 mg, oral, Daily    tadalafil 20 mg tablet 1 tablet, As needed    testosterone cypionate (DEPO-TESTOSTERONE) 200 mg, Once as needed    zinc gluconate 50 mg tablet 1 tablet, Daily    zolpidem (Ambien) 10 mg tablet 1 tablet, Nightly PRN         Visit Vitals  /70   Pulse 96   Ht 1.778 m (5' 10\")   Wt 97.9 kg (215 lb 14.4 oz)   SpO2 96%   BMI 30.98 kg/m²   Smoking Status Never   BSA 2.2 m²      Physical Exam  Vitals reviewed.   Constitutional:       Appearance: Normal appearance.   HENT:      Head: Normocephalic.   Cardiovascular:      Rate and Rhythm: Regular rhythm. Tachycardia present.   Pulmonary:      Effort: Pulmonary effort is normal. No respiratory distress.      Breath sounds: No wheezing.   Skin:     General: Skin is warm and dry.      Capillary Refill: Capillary refill takes less than 2 seconds.   Neurological:      Mental Status: He is alert.   Psychiatric:         Mood and Affect: Mood normal.         My Interpretation of Reviewed Study(s):  Echo (April 2024): Moderately reduced LV function with an EF of 35-40%.  Normal left atrium with normal right atrium.  No significant valvular pathology.  Cath (January 2024): Right coronary dominant system with a distal LAD 90% stenosis successful PCI to distal LAD with single drug-eluting stent  14-day cardiac Monitor (December 2024): Predominant sinus rhythm with average heart rate of 108 bpm.  75% of the time spent with heart rate greater than 100 bpm.  PACs 14%    Assessment/Plan   #PACs  #Inappropriate sinus tachycardia  Patient has a mixed pathology for his heart failure " associated with ischemic cardiomyopathy in the setting of CAD (underwent PCI in January 2024).  He also was noted to have elevated PAC burden with IST noted.  Patient was started on ivabradine but reported that his blood pressures were elevated with ivabradine and it was promptly discontinued.  His overall heart rates remain elevated and are proving to be quite difficult to control.  Class Ic agents are not an option given his CAD history.  Occasional light headedness and dizziness  a/w with Blood pressure changes.   Unclear etiology of sinus tachycardia.  Patient previously tried ivabradine but had increase in blood pressure but now since he is on carvedilol reasonable to try consider ivabradine again.  Alternatively we discussed alternative antiarrhythmics (Tikosyn) however patient is not keen on inpatient medication loading at this time.  EP study and ablation for PACs is reasonable but it is unlikely were going to be able to control his sinus tachycardia with an ablation without increased risk of sinus node dysfunction needing pacemaker.  Resume Ivabradine 2.5mg BID - can increase to 5mg BID for increased effect if needed  If no improvement or patient has similar side effects to ivabradine causing increased blood pressure then likely need to plan for inpatient Tikosyn loading    # HFrEF -ischemic cardiomyopathy  Repeat Echo pending since pt has been on GDMT  No indication for ICD at this time - Last LVEF 35-40%    Return to Clinic: Patient should return to the EP Clinic in 1 year    Jeremy Louis MD [facility]  Cardiac Electrophysiology  [email or web address]    **Disclaimer: This note was dictated by speech recognition, and every effort has been made to prevent any error in transcription, however minor errors may be present**

## 2025-03-24 ENCOUNTER — TELEPHONE (OUTPATIENT)
Dept: CARDIOLOGY | Facility: CLINIC | Age: 55
End: 2025-03-24
Payer: COMMERCIAL

## 2025-03-24 NOTE — TELEPHONE ENCOUNTER
PT CALLED ASKING FOR REFILL OF IVABRADINE, IT LOOKS LIKE IT WAS REFILLED ON 3/11/25 BY DR ALEXANDER AND SENT TO [facility].     PT ASKED ABOUT HYDRALAZINE ALSO.      I CALLED [facility], THEY DO HAVE THE PRESCRIPTIONS ON FILE FOR THE PT, THEY WILL FILL.

## 2025-04-15 ENCOUNTER — OFFICE VISIT (OUTPATIENT)
Dept: PAIN MEDICINE | Facility: CLINIC | Age: 55
End: 2025-04-15
Payer: COMMERCIAL

## 2025-04-15 VITALS
BODY MASS INDEX: 31.57 KG/M2 | RESPIRATION RATE: 20 BRPM | HEART RATE: 105 BPM | SYSTOLIC BLOOD PRESSURE: 134 MMHG | WEIGHT: 220 LBS | DIASTOLIC BLOOD PRESSURE: 88 MMHG

## 2025-04-15 DIAGNOSIS — M17.12 UNILATERAL PRIMARY OSTEOARTHRITIS, LEFT KNEE: Primary | ICD-10-CM

## 2025-04-15 DIAGNOSIS — M12.562 TRAUMATIC ARTHROPATHY OF LEFT KNEE: ICD-10-CM

## 2025-04-15 PROCEDURE — 2500000004 HC RX 250 GENERAL PHARMACY W/ HCPCS (ALT 636 FOR OP/ED)

## 2025-04-15 PROCEDURE — 20610 DRAIN/INJ JOINT/BURSA W/O US: CPT

## 2025-04-15 PROCEDURE — 99214 OFFICE O/P EST MOD 30 MIN: CPT | Mod: 25

## 2025-04-15 RX ORDER — DICLOFENAC SODIUM 10 MG/G
4 GEL TOPICAL 4 TIMES DAILY PRN
Qty: 300 G | Refills: 2 | Status: SHIPPED | OUTPATIENT
Start: 2025-04-15

## 2025-04-15 RX ORDER — OXYCODONE AND ACETAMINOPHEN 5; 325 MG/1; MG/1
1-2 TABLET ORAL DAILY PRN
Qty: 60 TABLET | Refills: 0 | Status: SHIPPED | OUTPATIENT
Start: 2025-04-15

## 2025-04-15 RX ORDER — OXYCODONE AND ACETAMINOPHEN 5; 325 MG/1; MG/1
1-2 TABLET ORAL DAILY PRN
Qty: 60 TABLET | Refills: 0 | Status: SHIPPED | OUTPATIENT
Start: 2025-04-15 | End: 2025-04-15

## 2025-04-15 RX ORDER — BUPIVACAINE HYDROCHLORIDE 5 MG/ML
3 INJECTION, SOLUTION EPIDURAL; INTRACAUDAL; PERINEURAL ONCE
Status: COMPLETED | OUTPATIENT
Start: 2025-04-15 | End: 2025-04-15

## 2025-04-15 RX ORDER — DICLOFENAC SODIUM 10 MG/G
4 GEL TOPICAL 4 TIMES DAILY PRN
Qty: 300 G | Refills: 2 | Status: SHIPPED | OUTPATIENT
Start: 2025-04-15 | End: 2025-04-15

## 2025-04-15 RX ORDER — TRIAMCINOLONE ACETONIDE 40 MG/ML
40 INJECTION, SUSPENSION INTRA-ARTICULAR; INTRAMUSCULAR ONCE
Status: COMPLETED | OUTPATIENT
Start: 2025-04-15 | End: 2025-04-15

## 2025-04-15 RX ADMIN — TRIAMCINOLONE ACETONIDE 40 MG: 40 INJECTION, SUSPENSION INTRA-ARTICULAR; INTRAMUSCULAR at 11:01

## 2025-04-15 RX ADMIN — BUPIVACAINE HYDROCHLORIDE 15 MG: 5 INJECTION, SOLUTION EPIDURAL; INTRACAUDAL; PERINEURAL at 11:00

## 2025-04-15 ASSESSMENT — ENCOUNTER SYMPTOMS
EYES NEGATIVE: 1
DYSPHORIC MOOD: 0
ENDOCRINE NEGATIVE: 1
BRUISES/BLEEDS EASILY: 0
PALPITATIONS: 0
CONSTITUTIONAL NEGATIVE: 1
ALLERGIC/IMMUNOLOGIC NEGATIVE: 1
FACIAL ASYMMETRY: 0
MYALGIAS: 1
LIGHT-HEADEDNESS: 0
WEAKNESS: 0
ADENOPATHY: 0
SHORTNESS OF BREATH: 0
WHEEZING: 0
ARTHRALGIAS: 1
COUGH: 0

## 2025-04-15 ASSESSMENT — PAIN SCALES - GENERAL: PAINLEVEL_OUTOF10: 7

## 2025-04-15 NOTE — PROGRESS NOTES
Subjective   Patient ID: Sd Andino is a 54 y.o. male who presents for Pain (FUV for L knee pain. 5/10 today. Pain is described as a constant achey and throbbing pain. Walking or daily activity will cause or increase the pain. Massage, ice, tens unit will help relieve the pain. Patient would like a L knee injection today. Refills needed for Percocet and Voltaren gel.). ZONIA = 34/100. ETOH screen negative.   Cherry Covington RN 04/15/25 10:51 AM     The patient is a 54-year-old male presents today for a 3-month follow-up appointment for medication management and a left knee injection.  He currently rates his pain a 5/10, described as a constant aching throbbing pain.  He says he has good days and bad days with the pain, but he is maintaining on Voltaren and Percocet 5/325 mg twice a day as needed. The patient is stable and reports well controlled pain on the current regimen. The patient is able to perform activities of daily living on this medication regimen. The patient reports tolerable side effects from this medication. No evidence of aberrant drug seeking behaviors. Here today for periodic review of therapy and refill of medication.  He last had a left knee intra-articular steroid injection 1/14/2025, and he obtains significant relief from this injection lasting for 3 months.  This has been his pattern for the past few years, the injections worked very well for 3 months.  He is interested in repeating it today.        Review of Systems   Constitutional: Negative.    HENT: Negative.     Eyes: Negative.    Respiratory:  Negative for cough, shortness of breath and wheezing.    Cardiovascular:  Negative for chest pain, palpitations and leg swelling.   Endocrine: Negative.    Genitourinary: Negative.    Musculoskeletal:  Positive for arthralgias and myalgias.   Skin: Negative.    Allergic/Immunologic: Negative.    Neurological:  Negative for facial asymmetry, weakness and light-headedness.   Hematological:   Negative for adenopathy. Does not bruise/bleed easily.   Psychiatric/Behavioral:  Negative for dysphoric mood and suicidal ideas.        Objective   Physical Exam  Constitutional:       General: He is not in acute distress.     Appearance: Normal appearance.   HENT:      Head: Normocephalic.      Mouth/Throat:      Mouth: Mucous membranes are moist.   Eyes:      Extraocular Movements: Extraocular movements intact.   Cardiovascular:      Rate and Rhythm: Normal rate and regular rhythm.      Pulses: Normal pulses.      Heart sounds: Normal heart sounds. No murmur heard.     No friction rub. No gallop.   Pulmonary:      Effort: Pulmonary effort is normal.      Breath sounds: Normal breath sounds. No wheezing, rhonchi or rales.   Abdominal:      General: Abdomen is flat.      Palpations: Abdomen is soft.   Musculoskeletal:      Cervical back: Normal range of motion.      Right lower leg: No edema.      Left lower leg: No edema.      Comments: Ambulates without assistance  Strength 5/5 BLE  Left knee painful flexion and extension, no evident effusion   Lymphadenopathy:      Cervical: No cervical adenopathy.   Skin:     General: Skin is warm and dry.   Neurological:      General: No focal deficit present.      Mental Status: He is alert and oriented to person, place, and time. Mental status is at baseline.   Psychiatric:         Mood and Affect: Mood normal.         Behavior: Behavior normal.         Assessment/Plan   Diagnoses and all orders for this visit:  Unilateral primary osteoarthritis, left knee  -     bupivacaine PF (Marcaine) 0.5 % (5 mg/mL) injection 15 mg  -     triamcinolone acetonide (Kenalog-40) injection 40 mg  -     oxyCODONE-acetaminophen (Percocet) 5-325 mg tablet; Take 1-2 tablets by mouth once daily as needed for severe pain (7 - 10).  Traumatic arthropathy of left knee  -     Joint Injection/Aspiration  -     diclofenac sodium (Voltaren) 1 % gel; Apply 4.5 inches (4 g) topically 4 times a day as  needed (as needed for pain).       The patient is a 54-year-old male with a past medical history significant for the above-mentioned problems.  He is here today for medication refills and a left knee injection, see separate procedure documentation.  I would anticipate him needing a repeat knee injection in 3 months, as that has been his longstanding pattern.  Patient is on chronic opioid therapy. Patient OARRS/PDMP was reviewed, pain treatment agreement was up-to-date, urine drug screen is up-to-date, risks and benefits of opioid therapy were discussed with the patient, and Naloxone was offered to the patient within the past year. The current opioid therapy allows the patient to continue to perform activities of daily living. Patient will be monitored closely for any sign of misuse or diversion.  He will follow-up with us in 3 months, call the clinic sooner if needed.    Patient ID: Sd Andino is a 54 y.o. male.    Joint Injection/Aspiration    Date/Time: 4/15/2025 11:17 AM    Performed by: MARTINEZ Velazco  Authorized by: MARTINEZ Velazco    Consent:     Consent obtained:  Written    Consent given by:  Patient    Risks, benefits, and alternatives were discussed: yes      Risks discussed:  Bleeding, infection, nerve damage and pain    Alternatives discussed:  No treatment, delayed treatment, observation and referral  Universal protocol:     Procedure explained and questions answered to patient or proxy's satisfaction: yes      Relevant documents present and verified: yes      Test results available: yes      Imaging studies available: yes      Site/side marked: yes      Immediately prior to procedure, a time out was called: yes      Patient identity confirmed:  Verbally with patient  Location:     Location:  Knee    Knee:  L knee  Anesthesia:     Anesthesia method:  None  Procedure details:     Preparation: Patient was prepped and draped in usual sterile fashion      Needle gauge: 27g.     Ultrasound guidance: no      Approach:  Anterior    Aspirate amount:  0    Steroid injected: yes      Specimen collected: no    Post-procedure details:     Dressing:  Adhesive bandage    Procedure completion:  Tolerated well, no immediate complications

## 2025-04-20 DIAGNOSIS — I50.22 CHRONIC SYSTOLIC HEART FAILURE: ICD-10-CM

## 2025-04-21 ENCOUNTER — HOSPITAL ENCOUNTER (OUTPATIENT)
Dept: CARDIOLOGY | Facility: HOSPITAL | Age: 55
Discharge: HOME | End: 2025-04-21
Payer: COMMERCIAL

## 2025-04-21 DIAGNOSIS — I50.22 CHRONIC SYSTOLIC HEART FAILURE: ICD-10-CM

## 2025-04-21 DIAGNOSIS — I50.20 UNSPECIFIED SYSTOLIC (CONGESTIVE) HEART FAILURE: ICD-10-CM

## 2025-04-21 LAB
AORTIC VALVE MEAN GRADIENT: 4 MMHG
AORTIC VALVE PEAK VELOCITY: 1.4 M/S
AV PEAK GRADIENT: 8 MMHG
AVA (PEAK VEL): 2.14 CM2
AVA (VTI): 2.64 CM2
EJECTION FRACTION APICAL 4 CHAMBER: 58.8
EJECTION FRACTION: 55 %
LEFT ATRIUM VOLUME AREA LENGTH INDEX BSA: 12.6 ML/M2
LEFT VENTRICLE INTERNAL DIMENSION DIASTOLE: 4.78 CM (ref 3.5–6)
LEFT VENTRICULAR OUTFLOW TRACT DIAMETER: 2 CM
LV EJECTION FRACTION BIPLANE: 55 %
MITRAL VALVE E/A RATIO: 0.85
RIGHT VENTRICLE FREE WALL PEAK S': 14.4 CM/S
TRICUSPID ANNULAR PLANE SYSTOLIC EXCURSION: 2.5 CM

## 2025-04-21 PROCEDURE — 93306 TTE W/DOPPLER COMPLETE: CPT

## 2025-04-21 PROCEDURE — 93306 TTE W/DOPPLER COMPLETE: CPT | Performed by: INTERNAL MEDICINE

## 2025-04-21 RX ORDER — SACUBITRIL AND VALSARTAN 97; 103 MG/1; MG/1
1 TABLET, FILM COATED ORAL 2 TIMES DAILY
Qty: 180 TABLET | Refills: 3 | Status: SHIPPED | OUTPATIENT
Start: 2025-04-21

## 2025-04-22 DIAGNOSIS — I10 PRIMARY HYPERTENSION: ICD-10-CM

## 2025-04-22 DIAGNOSIS — I50.22 CHRONIC SYSTOLIC CONGESTIVE HEART FAILURE: ICD-10-CM

## 2025-04-22 RX ORDER — HYDRALAZINE HYDROCHLORIDE 50 MG/1
50 TABLET, FILM COATED ORAL 3 TIMES DAILY
Qty: 270 TABLET | Refills: 3 | Status: SHIPPED | OUTPATIENT
Start: 2025-04-22 | End: 2026-04-22

## 2025-05-20 ENCOUNTER — TELEPHONE (OUTPATIENT)
Dept: PAIN MEDICINE | Facility: CLINIC | Age: 55
End: 2025-05-20
Payer: COMMERCIAL

## 2025-05-20 DIAGNOSIS — M17.12 UNILATERAL PRIMARY OSTEOARTHRITIS, LEFT KNEE: ICD-10-CM

## 2025-05-20 DIAGNOSIS — M12.562 TRAUMATIC ARTHROPATHY OF LEFT KNEE: ICD-10-CM

## 2025-05-20 RX ORDER — OXYCODONE AND ACETAMINOPHEN 5; 325 MG/1; MG/1
1-2 TABLET ORAL DAILY PRN
Qty: 60 TABLET | Refills: 0 | Status: SHIPPED | OUTPATIENT
Start: 2025-05-20

## 2025-05-20 RX ORDER — DICLOFENAC SODIUM 10 MG/G
4 GEL TOPICAL 4 TIMES DAILY PRN
Qty: 300 G | Refills: 2 | Status: SHIPPED | OUTPATIENT
Start: 2025-05-20

## 2025-06-03 ENCOUNTER — TELEPHONE (OUTPATIENT)
Dept: CARDIOLOGY | Facility: CLINIC | Age: 55
End: 2025-06-03
Payer: COMMERCIAL

## 2025-06-03 DIAGNOSIS — I10 HYPERTENSION, UNSPECIFIED TYPE: ICD-10-CM

## 2025-06-03 DIAGNOSIS — Z95.5 PRESENCE OF DRUG COATED STENT IN LAD CORONARY ARTERY: ICD-10-CM

## 2025-06-03 DIAGNOSIS — I50.22 CHRONIC SYSTOLIC HEART FAILURE: ICD-10-CM

## 2025-06-03 RX ORDER — IVABRADINE 5 MG/1
2.5 TABLET, FILM COATED ORAL 2 TIMES DAILY
Qty: 30 TABLET | Refills: 11 | Status: SHIPPED | OUTPATIENT
Start: 2025-06-03 | End: 2026-06-03

## 2025-06-03 NOTE — TELEPHONE ENCOUNTER
[facility]

## 2025-06-03 NOTE — TELEPHONE ENCOUNTER
Please propose Ivabradine 5mg for [facility] pharmacy to Dr. Villarreal.  Thanks.   DC instructions

## 2025-06-23 ENCOUNTER — TELEPHONE (OUTPATIENT)
Dept: PAIN MEDICINE | Facility: CLINIC | Age: 55
End: 2025-06-23
Payer: COMMERCIAL

## 2025-06-23 DIAGNOSIS — M17.12 UNILATERAL PRIMARY OSTEOARTHRITIS, LEFT KNEE: ICD-10-CM

## 2025-06-23 RX ORDER — OXYCODONE AND ACETAMINOPHEN 5; 325 MG/1; MG/1
1-2 TABLET ORAL DAILY PRN
Qty: 60 TABLET | Refills: 0 | Status: SHIPPED | OUTPATIENT
Start: 2025-06-23

## 2025-07-24 DIAGNOSIS — R07.9 CHEST PAIN: ICD-10-CM

## 2025-07-24 RX ORDER — ATORVASTATIN CALCIUM 40 MG/1
40 TABLET, FILM COATED ORAL NIGHTLY
Qty: 90 TABLET | Refills: 3 | Status: SHIPPED | OUTPATIENT
Start: 2025-07-24

## 2025-07-28 ENCOUNTER — TELEPHONE (OUTPATIENT)
Dept: PAIN MEDICINE | Facility: CLINIC | Age: 55
End: 2025-07-28
Payer: COMMERCIAL

## 2025-07-28 DIAGNOSIS — M12.562 TRAUMATIC ARTHROPATHY OF LEFT KNEE: ICD-10-CM

## 2025-07-28 DIAGNOSIS — M17.12 UNILATERAL PRIMARY OSTEOARTHRITIS, LEFT KNEE: ICD-10-CM

## 2025-07-28 RX ORDER — DICLOFENAC SODIUM 10 MG/G
4 GEL TOPICAL 4 TIMES DAILY PRN
Qty: 300 G | Refills: 2 | Status: SHIPPED | OUTPATIENT
Start: 2025-07-28

## 2025-07-28 RX ORDER — OXYCODONE AND ACETAMINOPHEN 5; 325 MG/1; MG/1
1-2 TABLET ORAL DAILY PRN
Qty: 60 TABLET | Refills: 0 | Status: SHIPPED | OUTPATIENT
Start: 2025-07-28

## 2025-07-31 ENCOUNTER — OFFICE VISIT (OUTPATIENT)
Dept: PAIN MEDICINE | Facility: CLINIC | Age: 55
End: 2025-07-31
Payer: COMMERCIAL

## 2025-07-31 VITALS
RESPIRATION RATE: 16 BRPM | SYSTOLIC BLOOD PRESSURE: 121 MMHG | HEART RATE: 80 BPM | WEIGHT: 222 LBS | DIASTOLIC BLOOD PRESSURE: 78 MMHG | BODY MASS INDEX: 31.85 KG/M2

## 2025-07-31 DIAGNOSIS — M12.562 TRAUMATIC ARTHROPATHY OF LEFT KNEE: Primary | ICD-10-CM

## 2025-07-31 DIAGNOSIS — M17.12 UNILATERAL PRIMARY OSTEOARTHRITIS, LEFT KNEE: ICD-10-CM

## 2025-07-31 PROCEDURE — 2500000004 HC RX 250 GENERAL PHARMACY W/ HCPCS (ALT 636 FOR OP/ED)

## 2025-07-31 PROCEDURE — 20610 DRAIN/INJ JOINT/BURSA W/O US: CPT

## 2025-07-31 PROCEDURE — 99214 OFFICE O/P EST MOD 30 MIN: CPT | Mod: 25

## 2025-07-31 RX ORDER — TRIAMCINOLONE ACETONIDE 40 MG/ML
40 INJECTION, SUSPENSION INTRA-ARTICULAR; INTRAMUSCULAR ONCE
Status: COMPLETED | OUTPATIENT
Start: 2025-07-31 | End: 2025-07-31

## 2025-07-31 RX ORDER — BUPIVACAINE HYDROCHLORIDE 2.5 MG/ML
3 INJECTION, SOLUTION EPIDURAL; INFILTRATION; INTRACAUDAL; PERINEURAL ONCE
Status: COMPLETED | OUTPATIENT
Start: 2025-07-31 | End: 2025-07-31

## 2025-07-31 RX ADMIN — TRIAMCINOLONE ACETONIDE 40 MG: 40 INJECTION, SUSPENSION INTRA-ARTICULAR; INTRAMUSCULAR at 08:07

## 2025-07-31 RX ADMIN — BUPIVACAINE HYDROCHLORIDE 7.5 MG: 2.5 INJECTION, SOLUTION EPIDURAL; INFILTRATION; INTRACAUDAL; PERINEURAL at 08:06

## 2025-07-31 ASSESSMENT — ENCOUNTER SYMPTOMS
ALLERGIC/IMMUNOLOGIC NEGATIVE: 1
WEAKNESS: 0
SHORTNESS OF BREATH: 0
DYSPHORIC MOOD: 0
CONSTITUTIONAL NEGATIVE: 1
COUGH: 0
LIGHT-HEADEDNESS: 0
BACK PAIN: 1
EYES NEGATIVE: 1
ENDOCRINE NEGATIVE: 1
MYALGIAS: 1
ADENOPATHY: 0
FACIAL ASYMMETRY: 0
PALPITATIONS: 0
ARTHRALGIAS: 0
BRUISES/BLEEDS EASILY: 0
WHEEZING: 0

## 2025-07-31 NOTE — PROGRESS NOTES
Subjective   Patient ID: Sd Andino is a 55 y.o. male who presents for Pain (FUV for L knee pain. Would like injection today. Pain score 5/10 today. Pain is described as a constant sharp, throbbing pain. Normal activity and walking cause or increase pain. He uses an ice machine at night which helps him to sleep. ZONIA = 34/100. ).  Cherry Covington RN 25 8:12 AM     The patient is a 55-year-old male who presents today for a 3-month follow-up appointment for medication management and for left knee injection.  He currently rates his pain a 5/10 in his left knee, described as constant sharp throbbing pain.  He says the pain waxes and wanes.  He says he knows he needs a right knee replacement, he is just hesitant to move forward with this, as his neighbor  after having knee replacement.  He is also currently using Percocet 5-325 mg 1 to 2 tablets daily as needed, as well as Voltaren gel.  He denies side effects to these medications and slightly helped take the edge off his pain and improve his level of functioning overall.  He is wanting to maintain on current regimen and received an injection in his left knee.        Review of Systems   Constitutional: Negative.    HENT: Negative.     Eyes: Negative.    Respiratory:  Negative for cough, shortness of breath and wheezing.    Cardiovascular:  Negative for chest pain, palpitations and leg swelling.   Endocrine: Negative.    Genitourinary: Negative.    Musculoskeletal:  Positive for back pain and myalgias. Negative for arthralgias.   Skin: Negative.    Allergic/Immunologic: Negative.    Neurological:  Negative for facial asymmetry, weakness and light-headedness.   Hematological:  Negative for adenopathy. Does not bruise/bleed easily.   Psychiatric/Behavioral:  Negative for dysphoric mood and suicidal ideas.        Objective   Physical Exam  Constitutional:       General: He is not in acute distress.     Appearance: Normal appearance.   HENT:      Head:  Normocephalic.      Mouth/Throat:      Mouth: Mucous membranes are moist.     Eyes:      Extraocular Movements: Extraocular movements intact.       Cardiovascular:      Rate and Rhythm: Normal rate and regular rhythm.      Pulses: Normal pulses.      Heart sounds: Normal heart sounds. No murmur heard.     No friction rub. No gallop.   Pulmonary:      Effort: Pulmonary effort is normal.      Breath sounds: Normal breath sounds. No wheezing, rhonchi or rales.   Abdominal:      General: Abdomen is flat.      Palpations: Abdomen is soft.     Musculoskeletal:      Cervical back: Normal range of motion.      Right lower leg: No edema.      Left lower leg: No edema.      Comments: Ambulates without assistance  Strength 5/5 BLE  Left knee painful flexion and extension   Lymphadenopathy:      Cervical: No cervical adenopathy.     Skin:     General: Skin is warm and dry.     Neurological:      General: No focal deficit present.      Mental Status: He is alert and oriented to person, place, and time. Mental status is at baseline.     Psychiatric:         Mood and Affect: Mood normal.         Behavior: Behavior normal.         Assessment/Plan   Diagnoses and all orders for this visit:  Traumatic arthropathy of left knee  -     bupivacaine PF 0.25 % (Marcaine) 0.25 % (2.5 mg/mL) injection 7.5 mg  -     triamcinolone acetonide (Kenalog-40) injection 40 mg  -     Drug Screen, Urine With Reflex to Confirmation  Unilateral primary osteoarthritis, left knee  -     bupivacaine PF 0.25 % (Marcaine) 0.25 % (2.5 mg/mL) injection 7.5 mg  -     triamcinolone acetonide (Kenalog-40) injection 40 mg  -     Drug Screen, Urine With Reflex to Confirmation       The patient is a 55-year-old male with past medical history significant for the above-mentioned problems.  Overall his pain is stable, he has always done very well with left knee intra-articular steroid injections, he is aware he needs a knee replacement, but these injections given every  3 months provides enough relief that he is able to function and perform ADLs.  See separate procedure documentation for the left knee injection.  His medications were sent in a couple days ago, he does not need refills at this time.  PDMP reviewed.  He will follow-up with us in 3 months, call the clinic sooner if needed.    Patient is on chronic opioid therapy. Patient OARRS/PDMP was reviewed, pain treatment agreement was up-to-date, urine drug screen is up-to-date, risks and benefits of opioid therapy were discussed with the patient, and Naloxone was offered to the patient within the past year. The current opioid therapy allows the patient to continue to perform activities of daily living. Patient will be monitored closely for any sign of misuse or diversion.     Patient ID: Sd Andino is a 55 y.o. male.    Joint Injection/Aspiration    Date/Time: 7/31/2025 8:37 AM    Performed by: MARTINEZ Velazco  Authorized by: MARTINEZ Velazco    Consent:     Consent obtained:  Written    Consent given by:  Patient    Risks, benefits, and alternatives were discussed: yes      Risks discussed:  Bleeding, infection, nerve damage and pain    Alternatives discussed:  No treatment, delayed treatment, observation and referral  Universal protocol:     Procedure explained and questions answered to patient or proxy's satisfaction: yes      Relevant documents present and verified: yes      Test results available: yes      Imaging studies available: yes      Site/side marked: yes      Immediately prior to procedure, a time out was called: yes      Patient identity confirmed:  Verbally with patient  Location:     Location:  Knee    Knee:  L knee  Anesthesia:     Anesthesia method:  None  Procedure details:     Preparation: Patient was prepped and draped in usual sterile fashion      Needle gauge: 27g.    Ultrasound guidance: no      Approach:  Anterior    Aspirate amount:  0    Steroid injected: yes      Specimen  collected: no    Post-procedure details:     Dressing:  Adhesive bandage    Procedure completion:  Tolerated well, no immediate complications  Comments:      A total of 40 mg of triamcinolone and 3 mL of bupivacaine 0.25% were injected into the left knee without difficulty.

## 2025-08-03 LAB
AMPHETAMINES UR QL: NEGATIVE NG/ML
BARBITURATES UR QL: NEGATIVE NG/ML
BENZODIAZ UR QL: NEGATIVE NG/ML
BZE UR QL: NEGATIVE NG/ML
CODEINE UR-MCNC: NEGATIVE NG/ML
CREAT UR-MCNC: 84 MG/DL
DRUG SCREEN COMMENT UR-IMP: ABNORMAL
DRUG SCREEN COMMENT UR-IMP: ABNORMAL
FENTANYL UR QL SCN: NEGATIVE NG/ML
HYDROCODONE UR-MCNC: NEGATIVE NG/ML
HYDROMORPHONE UR-MCNC: NEGATIVE NG/ML
METHADONE UR QL: NEGATIVE NG/ML
MORPHINE UR-MCNC: NEGATIVE NG/ML
NORHYDROCODONE UR CFM-MCNC: NEGATIVE NG/ML
NOROXYCODONE UR CFM-MCNC: 720 NG/ML
OPIATES UR QL: ABNORMAL NG/ML
OXIDANTS UR QL: NEGATIVE MCG/ML
OXYCODONE UR QL: POSITIVE NG/ML
OXYCODONE UR-MCNC: 417 NG/ML
OXYMORPHONE UR-MCNC: 340 NG/ML
PCP UR QL: NEGATIVE NG/ML
PH UR: 5.8 [PH] (ref 4.5–9)
QUEST NOTES AND COMMENTS: ABNORMAL
THC UR QL: NEGATIVE NG/ML

## 2026-03-24 ENCOUNTER — APPOINTMENT (OUTPATIENT)
Dept: CARDIOLOGY | Facility: CLINIC | Age: 56
End: 2026-03-24
Payer: COMMERCIAL

## (undated) DEVICE — BAND, VASCULAR, RADIAL HEMOSTAT, REGULAR 24CM

## (undated) DEVICE — Device

## (undated) DEVICE — CATHETER, BALLOON DILATION, EUPHORA SEMICOMPLIANT 2.0  X 15 MM X 142CM

## (undated) DEVICE — VALVE, HEMOSTASIS, GUARDIAN II NC, W/ GUIDEWIRE INSERTION TOOL

## (undated) DEVICE — CATHETER, GUIDING, LAUNCHER, 6FR, JL 4.0

## (undated) DEVICE — CATHETER, ANGIO, EXPO, 5FR FL4 CURVE

## (undated) DEVICE — DEVICE KIT, INFLATION, CUSTOM, PARMA

## (undated) DEVICE — GUIDEWIRE, UNIVERSAL BALANCE MID WEIGHT, 190CM, STR

## (undated) DEVICE — SHEATH, GLIDESHEATH, SLENDER, 6FR 10CM